# Patient Record
Sex: FEMALE | Race: WHITE | NOT HISPANIC OR LATINO
[De-identification: names, ages, dates, MRNs, and addresses within clinical notes are randomized per-mention and may not be internally consistent; named-entity substitution may affect disease eponyms.]

---

## 2017-04-02 ENCOUNTER — RX RENEWAL (OUTPATIENT)
Age: 62
End: 2017-04-02

## 2017-05-02 ENCOUNTER — RX RENEWAL (OUTPATIENT)
Age: 62
End: 2017-05-02

## 2017-05-17 ENCOUNTER — APPOINTMENT (OUTPATIENT)
Dept: DERMATOLOGY | Facility: CLINIC | Age: 62
End: 2017-05-17

## 2017-08-02 ENCOUNTER — LABORATORY RESULT (OUTPATIENT)
Age: 62
End: 2017-08-02

## 2017-08-07 ENCOUNTER — APPOINTMENT (OUTPATIENT)
Dept: INTERNAL MEDICINE | Facility: CLINIC | Age: 62
End: 2017-08-07
Payer: COMMERCIAL

## 2017-08-07 VITALS
DIASTOLIC BLOOD PRESSURE: 74 MMHG | RESPIRATION RATE: 16 BRPM | HEIGHT: 60 IN | TEMPERATURE: 97.8 F | WEIGHT: 112.99 LBS | OXYGEN SATURATION: 97 % | BODY MASS INDEX: 22.18 KG/M2 | SYSTOLIC BLOOD PRESSURE: 118 MMHG | HEART RATE: 84 BPM

## 2017-08-07 PROCEDURE — 90670 PCV13 VACCINE IM: CPT | Mod: GA

## 2017-08-07 PROCEDURE — 99214 OFFICE O/P EST MOD 30 MIN: CPT | Mod: 25

## 2017-08-07 PROCEDURE — G0009: CPT

## 2017-10-05 ENCOUNTER — RX RENEWAL (OUTPATIENT)
Age: 62
End: 2017-10-05

## 2018-01-10 ENCOUNTER — RX RENEWAL (OUTPATIENT)
Age: 63
End: 2018-01-10

## 2018-03-09 ENCOUNTER — LABORATORY RESULT (OUTPATIENT)
Age: 63
End: 2018-03-09

## 2018-03-14 ENCOUNTER — APPOINTMENT (OUTPATIENT)
Dept: INTERNAL MEDICINE | Facility: CLINIC | Age: 63
End: 2018-03-14
Payer: COMMERCIAL

## 2018-03-14 VITALS
SYSTOLIC BLOOD PRESSURE: 152 MMHG | BODY MASS INDEX: 22.19 KG/M2 | HEART RATE: 88 BPM | WEIGHT: 113 LBS | TEMPERATURE: 97.9 F | DIASTOLIC BLOOD PRESSURE: 88 MMHG | OXYGEN SATURATION: 97 % | RESPIRATION RATE: 14 BRPM | HEIGHT: 60 IN

## 2018-03-14 PROCEDURE — 99214 OFFICE O/P EST MOD 30 MIN: CPT

## 2018-03-19 ENCOUNTER — EMERGENCY (EMERGENCY)
Facility: HOSPITAL | Age: 63
LOS: 0 days | Discharge: HOME | End: 2018-03-19
Attending: EMERGENCY MEDICINE | Admitting: EMERGENCY MEDICINE
Payer: COMMERCIAL

## 2018-03-19 VITALS — WEIGHT: 113.98 LBS

## 2018-03-19 VITALS
OXYGEN SATURATION: 100 % | DIASTOLIC BLOOD PRESSURE: 95 MMHG | TEMPERATURE: 98 F | SYSTOLIC BLOOD PRESSURE: 180 MMHG | RESPIRATION RATE: 19 BRPM | HEART RATE: 88 BPM

## 2018-03-19 DIAGNOSIS — K21.9 GASTRO-ESOPHAGEAL REFLUX DISEASE WITHOUT ESOPHAGITIS: ICD-10-CM

## 2018-03-19 DIAGNOSIS — G44.89 OTHER HEADACHE SYNDROME: ICD-10-CM

## 2018-03-19 DIAGNOSIS — E03.9 HYPOTHYROIDISM, UNSPECIFIED: ICD-10-CM

## 2018-03-19 DIAGNOSIS — I10 ESSENTIAL (PRIMARY) HYPERTENSION: ICD-10-CM

## 2018-03-19 DIAGNOSIS — Z88.5 ALLERGY STATUS TO NARCOTIC AGENT: ICD-10-CM

## 2018-03-19 DIAGNOSIS — F41.9 ANXIETY DISORDER, UNSPECIFIED: ICD-10-CM

## 2018-03-19 LAB
ALBUMIN SERPL ELPH-MCNC: 4 G/DL — SIGNIFICANT CHANGE UP (ref 3.3–5)
ALP SERPL-CCNC: 71 U/L — SIGNIFICANT CHANGE UP (ref 40–120)
ALT FLD-CCNC: 24 U/L — SIGNIFICANT CHANGE UP (ref 12–78)
ANION GAP SERPL CALC-SCNC: 6 MMOL/L — SIGNIFICANT CHANGE UP (ref 5–17)
APPEARANCE UR: CLEAR — SIGNIFICANT CHANGE UP
AST SERPL-CCNC: 17 U/L — SIGNIFICANT CHANGE UP (ref 15–37)
BACTERIA # UR AUTO: NEGATIVE — SIGNIFICANT CHANGE UP
BASOPHILS # BLD AUTO: 0.05 K/UL — SIGNIFICANT CHANGE UP (ref 0–0.2)
BASOPHILS NFR BLD AUTO: 0.8 % — SIGNIFICANT CHANGE UP (ref 0–2)
BILIRUB SERPL-MCNC: 0.2 MG/DL — SIGNIFICANT CHANGE UP (ref 0.2–1.2)
BILIRUB UR-MCNC: NEGATIVE — SIGNIFICANT CHANGE UP
BUN SERPL-MCNC: 15 MG/DL — SIGNIFICANT CHANGE UP (ref 7–23)
CALCIUM SERPL-MCNC: 8.7 MG/DL — SIGNIFICANT CHANGE UP (ref 8.5–10.1)
CHLORIDE SERPL-SCNC: 104 MMOL/L — SIGNIFICANT CHANGE UP (ref 96–108)
CO2 SERPL-SCNC: 29 MMOL/L — SIGNIFICANT CHANGE UP (ref 22–31)
COLOR SPEC: YELLOW — SIGNIFICANT CHANGE UP
CREAT SERPL-MCNC: 0.66 MG/DL — SIGNIFICANT CHANGE UP (ref 0.5–1.3)
DIFF PNL FLD: (no result)
EOSINOPHIL # BLD AUTO: 0.15 K/UL — SIGNIFICANT CHANGE UP (ref 0–0.5)
EOSINOPHIL NFR BLD AUTO: 2.4 % — SIGNIFICANT CHANGE UP (ref 0–6)
EPI CELLS # UR: NEGATIVE — SIGNIFICANT CHANGE UP
GLUCOSE SERPL-MCNC: 93 MG/DL — SIGNIFICANT CHANGE UP (ref 70–99)
GLUCOSE UR QL: NEGATIVE MG/DL — SIGNIFICANT CHANGE UP
HCT VFR BLD CALC: 39.9 % — SIGNIFICANT CHANGE UP (ref 34.5–45)
HGB BLD-MCNC: 13.2 G/DL — SIGNIFICANT CHANGE UP (ref 11.5–15.5)
IMM GRANULOCYTES NFR BLD AUTO: 0.2 % — SIGNIFICANT CHANGE UP (ref 0–1.5)
KETONES UR-MCNC: NEGATIVE — SIGNIFICANT CHANGE UP
LEUKOCYTE ESTERASE UR-ACNC: NEGATIVE — SIGNIFICANT CHANGE UP
LYMPHOCYTES # BLD AUTO: 1.58 K/UL — SIGNIFICANT CHANGE UP (ref 1–3.3)
LYMPHOCYTES # BLD AUTO: 25 % — SIGNIFICANT CHANGE UP (ref 13–44)
MCHC RBC-ENTMCNC: 28.8 PG — SIGNIFICANT CHANGE UP (ref 27–34)
MCHC RBC-ENTMCNC: 33.1 GM/DL — SIGNIFICANT CHANGE UP (ref 32–36)
MCV RBC AUTO: 86.9 FL — SIGNIFICANT CHANGE UP (ref 80–100)
MONOCYTES # BLD AUTO: 0.54 K/UL — SIGNIFICANT CHANGE UP (ref 0–0.9)
MONOCYTES NFR BLD AUTO: 8.5 % — SIGNIFICANT CHANGE UP (ref 2–14)
NEUTROPHILS # BLD AUTO: 4 K/UL — SIGNIFICANT CHANGE UP (ref 1.8–7.4)
NEUTROPHILS NFR BLD AUTO: 63.1 % — SIGNIFICANT CHANGE UP (ref 43–77)
NITRITE UR-MCNC: NEGATIVE — SIGNIFICANT CHANGE UP
NRBC # BLD: 0 /100 WBCS — SIGNIFICANT CHANGE UP (ref 0–0)
PH UR: 7 — SIGNIFICANT CHANGE UP (ref 5–8)
PLATELET # BLD AUTO: 247 K/UL — SIGNIFICANT CHANGE UP (ref 150–400)
POTASSIUM SERPL-MCNC: 3.3 MMOL/L — LOW (ref 3.5–5.3)
POTASSIUM SERPL-SCNC: 3.3 MMOL/L — LOW (ref 3.5–5.3)
PROT SERPL-MCNC: 7.2 GM/DL — SIGNIFICANT CHANGE UP (ref 6–8.3)
PROT UR-MCNC: NEGATIVE MG/DL — SIGNIFICANT CHANGE UP
RBC # BLD: 4.59 M/UL — SIGNIFICANT CHANGE UP (ref 3.8–5.2)
RBC # FLD: 13.6 % — SIGNIFICANT CHANGE UP (ref 10.3–14.5)
RBC CASTS # UR COMP ASSIST: SIGNIFICANT CHANGE UP /HPF (ref 0–4)
SODIUM SERPL-SCNC: 139 MMOL/L — SIGNIFICANT CHANGE UP (ref 135–145)
SP GR SPEC: 1 — LOW (ref 1.01–1.02)
TROPONIN I SERPL-MCNC: <0.015 NG/ML — SIGNIFICANT CHANGE UP (ref 0.01–0.04)
TROPONIN I SERPL-MCNC: <0.015 NG/ML — SIGNIFICANT CHANGE UP (ref 0.01–0.04)
UROBILINOGEN FLD QL: NEGATIVE MG/DL — SIGNIFICANT CHANGE UP
WBC # BLD: 6.33 K/UL — SIGNIFICANT CHANGE UP (ref 3.8–10.5)
WBC # FLD AUTO: 6.33 K/UL — SIGNIFICANT CHANGE UP (ref 3.8–10.5)
WBC UR QL: SIGNIFICANT CHANGE UP

## 2018-03-19 PROCEDURE — 99285 EMERGENCY DEPT VISIT HI MDM: CPT | Mod: 25

## 2018-03-19 PROCEDURE — 93010 ELECTROCARDIOGRAM REPORT: CPT

## 2018-03-19 PROCEDURE — 70450 CT HEAD/BRAIN W/O DYE: CPT | Mod: 26

## 2018-03-19 PROCEDURE — 71046 X-RAY EXAM CHEST 2 VIEWS: CPT | Mod: 26

## 2018-03-19 RX ORDER — LOSARTAN POTASSIUM 100 MG/1
50 TABLET, FILM COATED ORAL DAILY
Qty: 0 | Refills: 0 | Status: DISCONTINUED | OUTPATIENT
Start: 2018-03-19 | End: 2018-03-19

## 2018-03-19 RX ADMIN — LOSARTAN POTASSIUM 50 MILLIGRAM(S): 100 TABLET, FILM COATED ORAL at 22:52

## 2018-03-19 NOTE — ED STATDOCS - OBJECTIVE STATEMENT
61 y/o F w/ pmhx of acid reflux, hypothyroidism, HTN, anxiety, presents to ED c/o HTN. Pt checked BP and states it has been elevated x4-5 days, sent in by Dr. Ames. Last night 177/100, this /94. Currently on Losartan x1+ year. (+)headache (+)chest pains intermittently 2-3x per day. No recent foreign travel or recent sick contacts. Non-smoker. Occasional alcohol use. No illicit drug use. BP in /107.

## 2018-03-19 NOTE — ED STATDOCS - PROGRESS NOTE DETAILS
63 yo female with a PMH of anxiety, HTN (on losartan, increased to 50mg) presents with elevated BP 3-4 days with intermittent episodes of headache. Highest was 177/100. -Kana Gross PA-C Spoke with Dr. Telles, covering for Dr. Ames. He said that the pt can double up on her 50mg of Losartan and call the office tomorrow for a f/u appointment. -Kana Gross PA-C

## 2018-03-19 NOTE — ED STATDOCS - ATTENDING CONTRIBUTION TO CARE
I, Barbara Nayak MD,  performed the initial face to face bedside interview with this patient regarding history of present illness, review of symptoms and relevant past medical, social and family history.  I completed an independent physical examination.  I was the initial provider who evaluated this patient. I have signed out the follow up of any pending tests (i.e. labs, radiological studies) to the ACP.  I have communicated the patient’s plan of care and disposition with the ACP.  The history, relevant review of systems, past medical and surgical history, medical decision making, and physical examination was documented by the scribe in my presence and I attest to the accuracy of the documentation.

## 2018-03-19 NOTE — ED STATDOCS - CARE PLAN
Principal Discharge DX:	Hypertension, unspecified type Principal Discharge DX:	Hypertension, unspecified type  Secondary Diagnosis:	Acute intractable headache, unspecified headache type

## 2018-03-20 ENCOUNTER — APPOINTMENT (OUTPATIENT)
Dept: INTERNAL MEDICINE | Facility: CLINIC | Age: 63
End: 2018-03-20
Payer: COMMERCIAL

## 2018-03-20 VITALS
BODY MASS INDEX: 22.38 KG/M2 | HEIGHT: 60 IN | HEART RATE: 94 BPM | WEIGHT: 114 LBS | TEMPERATURE: 98 F | DIASTOLIC BLOOD PRESSURE: 100 MMHG | RESPIRATION RATE: 16 BRPM | OXYGEN SATURATION: 99 % | SYSTOLIC BLOOD PRESSURE: 162 MMHG

## 2018-03-20 DIAGNOSIS — Z87.19 PERSONAL HISTORY OF OTHER DISEASES OF THE DIGESTIVE SYSTEM: ICD-10-CM

## 2018-03-20 LAB
CULTURE RESULTS: NO GROWTH — SIGNIFICANT CHANGE UP
SPECIMEN SOURCE: SIGNIFICANT CHANGE UP

## 2018-03-20 PROCEDURE — 99213 OFFICE O/P EST LOW 20 MIN: CPT

## 2018-03-20 RX ORDER — HYDROCHLOROTHIAZIDE 12.5 MG/1
12.5 CAPSULE ORAL DAILY
Qty: 60 | Refills: 0 | Status: DISCONTINUED | COMMUNITY
Start: 2018-03-20 | End: 2018-03-20

## 2018-03-22 ENCOUNTER — MESSAGE (OUTPATIENT)
Age: 63
End: 2018-03-22

## 2018-03-29 ENCOUNTER — APPOINTMENT (OUTPATIENT)
Dept: INTERNAL MEDICINE | Facility: CLINIC | Age: 63
End: 2018-03-29
Payer: COMMERCIAL

## 2018-03-29 VITALS
HEART RATE: 90 BPM | WEIGHT: 111 LBS | SYSTOLIC BLOOD PRESSURE: 126 MMHG | OXYGEN SATURATION: 99 % | RESPIRATION RATE: 16 BRPM | HEIGHT: 60 IN | BODY MASS INDEX: 21.79 KG/M2 | TEMPERATURE: 98.5 F | DIASTOLIC BLOOD PRESSURE: 80 MMHG

## 2018-03-29 PROCEDURE — 99213 OFFICE O/P EST LOW 20 MIN: CPT

## 2018-04-03 ENCOUNTER — RX RENEWAL (OUTPATIENT)
Age: 63
End: 2018-04-03

## 2018-04-05 ENCOUNTER — APPOINTMENT (OUTPATIENT)
Dept: INTERNAL MEDICINE | Facility: CLINIC | Age: 63
End: 2018-04-05
Payer: COMMERCIAL

## 2018-04-05 VITALS
DIASTOLIC BLOOD PRESSURE: 86 MMHG | HEART RATE: 82 BPM | OXYGEN SATURATION: 97 % | HEIGHT: 60 IN | WEIGHT: 113 LBS | TEMPERATURE: 98 F | BODY MASS INDEX: 22.19 KG/M2 | RESPIRATION RATE: 14 BRPM | SYSTOLIC BLOOD PRESSURE: 144 MMHG

## 2018-04-05 PROCEDURE — 99213 OFFICE O/P EST LOW 20 MIN: CPT

## 2018-04-05 RX ORDER — LOSARTAN POTASSIUM 50 MG/1
50 TABLET, FILM COATED ORAL
Qty: 90 | Refills: 1 | Status: DISCONTINUED | COMMUNITY
Start: 2018-01-10 | End: 2018-04-05

## 2018-04-06 RX ORDER — BUPROPION HYDROCHLORIDE 150 MG/1
150 TABLET, EXTENDED RELEASE ORAL
Qty: 30 | Refills: 0 | Status: DISCONTINUED | COMMUNITY
Start: 2018-03-26

## 2018-05-03 ENCOUNTER — APPOINTMENT (OUTPATIENT)
Dept: INTERNAL MEDICINE | Facility: CLINIC | Age: 63
End: 2018-05-03
Payer: COMMERCIAL

## 2018-05-03 ENCOUNTER — RESULT REVIEW (OUTPATIENT)
Age: 63
End: 2018-05-03

## 2018-05-03 VITALS
RESPIRATION RATE: 16 BRPM | WEIGHT: 110 LBS | HEIGHT: 60 IN | BODY MASS INDEX: 21.6 KG/M2 | DIASTOLIC BLOOD PRESSURE: 82 MMHG | HEART RATE: 92 BPM | OXYGEN SATURATION: 97 % | TEMPERATURE: 98 F | SYSTOLIC BLOOD PRESSURE: 130 MMHG

## 2018-05-03 PROCEDURE — 99213 OFFICE O/P EST LOW 20 MIN: CPT

## 2018-06-25 ENCOUNTER — RX RENEWAL (OUTPATIENT)
Age: 63
End: 2018-06-25

## 2018-07-05 ENCOUNTER — APPOINTMENT (OUTPATIENT)
Dept: OBGYN | Facility: CLINIC | Age: 63
End: 2018-07-05
Payer: COMMERCIAL

## 2018-07-05 VITALS
WEIGHT: 110.89 LBS | HEIGHT: 60 IN | BODY MASS INDEX: 21.77 KG/M2 | SYSTOLIC BLOOD PRESSURE: 130 MMHG | DIASTOLIC BLOOD PRESSURE: 80 MMHG

## 2018-07-05 PROCEDURE — 82270 OCCULT BLOOD FECES: CPT

## 2018-07-05 PROCEDURE — 99396 PREV VISIT EST AGE 40-64: CPT

## 2018-07-09 LAB — CYTOLOGY CVX/VAG DOC THIN PREP: NORMAL

## 2018-07-17 PROBLEM — I10 ESSENTIAL (PRIMARY) HYPERTENSION: Chronic | Status: INACTIVE | Noted: 2018-03-19 | Resolved: 2018-03-21

## 2018-07-17 PROBLEM — F41.9 ANXIETY DISORDER, UNSPECIFIED: Chronic | Status: INACTIVE | Noted: 2018-03-19 | Resolved: 2018-03-21

## 2018-07-17 PROBLEM — E03.9 HYPOTHYROIDISM, UNSPECIFIED: Chronic | Status: INACTIVE | Noted: 2018-03-19 | Resolved: 2018-03-21

## 2018-09-06 ENCOUNTER — LABORATORY RESULT (OUTPATIENT)
Age: 63
End: 2018-09-06

## 2018-09-12 ENCOUNTER — APPOINTMENT (OUTPATIENT)
Dept: INTERNAL MEDICINE | Facility: CLINIC | Age: 63
End: 2018-09-12
Payer: COMMERCIAL

## 2018-09-12 VITALS
OXYGEN SATURATION: 98 % | SYSTOLIC BLOOD PRESSURE: 110 MMHG | DIASTOLIC BLOOD PRESSURE: 84 MMHG | WEIGHT: 109.99 LBS | BODY MASS INDEX: 21.59 KG/M2 | HEIGHT: 60 IN | HEART RATE: 86 BPM | RESPIRATION RATE: 16 BRPM | TEMPERATURE: 98.6 F

## 2018-09-12 PROBLEM — I10 ESSENTIAL (PRIMARY) HYPERTENSION: Chronic | Status: ACTIVE | Noted: 2018-03-21

## 2018-09-12 PROBLEM — E03.9 HYPOTHYROIDISM, UNSPECIFIED: Chronic | Status: ACTIVE | Noted: 2018-03-21

## 2018-09-12 PROBLEM — F41.9 ANXIETY DISORDER, UNSPECIFIED: Chronic | Status: ACTIVE | Noted: 2018-03-21

## 2018-09-12 PROCEDURE — 99214 OFFICE O/P EST MOD 30 MIN: CPT

## 2018-09-14 ENCOUNTER — MEDICATION RENEWAL (OUTPATIENT)
Age: 63
End: 2018-09-14

## 2018-09-14 RX ORDER — HYDROCHLOROTHIAZIDE 12.5 MG/1
12.5 CAPSULE ORAL DAILY
Qty: 90 | Refills: 1 | Status: COMPLETED | COMMUNITY
Start: 2018-03-20 | End: 2018-09-14

## 2018-09-14 RX ORDER — LOSARTAN POTASSIUM 100 MG/1
100 TABLET, FILM COATED ORAL DAILY
Qty: 90 | Refills: 1 | Status: COMPLETED | COMMUNITY
Start: 2018-04-06 | End: 2018-09-14

## 2018-09-28 ENCOUNTER — RX RENEWAL (OUTPATIENT)
Age: 63
End: 2018-09-28

## 2018-10-18 ENCOUNTER — RX RENEWAL (OUTPATIENT)
Age: 63
End: 2018-10-18

## 2018-11-27 ENCOUNTER — OTHER (OUTPATIENT)
Age: 63
End: 2018-11-27

## 2018-11-27 ENCOUNTER — APPOINTMENT (OUTPATIENT)
Dept: INTERNAL MEDICINE | Facility: CLINIC | Age: 63
End: 2018-11-27
Payer: COMMERCIAL

## 2018-11-27 VITALS
SYSTOLIC BLOOD PRESSURE: 138 MMHG | DIASTOLIC BLOOD PRESSURE: 82 MMHG | HEART RATE: 74 BPM | WEIGHT: 111 LBS | HEIGHT: 60 IN | RESPIRATION RATE: 16 BRPM | OXYGEN SATURATION: 95 % | BODY MASS INDEX: 21.79 KG/M2 | TEMPERATURE: 98.7 F

## 2018-11-27 PROCEDURE — 99213 OFFICE O/P EST LOW 20 MIN: CPT

## 2018-11-27 NOTE — HISTORY OF PRESENT ILLNESS
[FreeTextEntry8] : Patient complains of sciatic pain left lower extremity, burning and tingling at times extending to left toes.  Left paraspinal tenderness lower lumbar area.  Tenderness left sciatic notch.\par Pain started at left hamstring 1 month ago then "moved to side and front of left thigh, traveling down leg to current status.  \par History of lower lumbar 2 disc fusion 18 years ago.\juana Does not have past MRI's in her possession.\par Had occasional "flares" over the years, not as long lasting as current.\juana Has been taking Motrin 800 mg BID and icing low back.  Provides temporary, partial relief.\juana Denies bowel changes or foot drop.\juana Has appointment with Dr. Antunez February. (soonest appointment, on waiting list for cancellation)\juana

## 2018-11-27 NOTE — REVIEW OF SYSTEMS
[Muscle Pain] : muscle pain [Back Pain] : back pain [Negative] : Heme/Lymph [FreeTextEntry9] : see HPI [de-identified] : see HPI

## 2018-11-27 NOTE — PHYSICAL EXAM
[No Acute Distress] : no acute distress [Well Nourished] : well nourished [Well Developed] : well developed [Supple] : supple [No Respiratory Distress] : no respiratory distress  [Clear to Auscultation] : lungs were clear to auscultation bilaterally [Normal Rate] : normal rate  [Regular Rhythm] : with a regular rhythm [No Edema] : there was no peripheral edema [No CVA Tenderness] : no CVA  tenderness [No Spinal Tenderness] : no spinal tenderness [Grossly Normal Strength/Tone] : grossly normal strength/tone [Coordination Grossly Intact] : coordination grossly intact [No Focal Deficits] : no focal deficits [Normal Affect] : the affect was normal [Normal Insight/Judgement] : insight and judgment were intact [de-identified] : left lower lumbar paraspinal tenderness, displays full ROM, pain elicited on internal rotation of leg. [de-identified] : mildly antalgic gait

## 2018-11-27 NOTE — PLAN
[FreeTextEntry1] : Continue ice to low back as tolerated 20 minute intervals.\par Trial Medrol dose pack as prescribed PC.\par After dose pack complete, if pain persistent, start Mobic 15 mg daily PC.\par Patient declines lumbar spinal x-ray.\par Script written for physical therapy for pain relief.\par Patient voices understanding she must present to ED for emergent symptoms.\par She will call office with her progress when Medrol pack completed, sooner if questions, worsening pain.

## 2018-12-17 NOTE — ED ADULT NURSE NOTE - TEMPLATE LIST FOR HEAD TO TOE ASSESSMENT
mouth 2 times daily for 30 days. Ulises Moore Date: 12/5/18 60 tablet 0    montelukast (SINGULAIR) 10 MG tablet Take 1 tablet by mouth daily 30 tablet 11    clotrimazole-betamethasone (LOTRISONE) 1-0.05 % cream Apply topically 2 times daily. 15 g 0    cyclobenzaprine (FLEXERIL) 10 MG tablet TAKE 1 TABLET BY MOUTH DAILY AS NEEDED FOR MUSCLE SPASMS 30 tablet 0    FLUoxetine (PROZAC) 20 MG capsule TAKE 1 CAPSULE BY MOUTH DAILY 90 capsule 1    pantoprazole (PROTONIX) 40 MG tablet TAKE ONE TABLET BY MOUTH DAILY 90 tablet 1    busPIRone (BUSPAR) 15 MG tablet Take 1 tablet by mouth 3 times daily Take for anxiety 90 tablet 2       Allergies:     Allergies   Allergen Reactions    Ibuprofen Anaphylaxis    Nsaids Anaphylaxis    Erythromycin Nausea And Vomiting    Bactrim Nausea And Vomiting and Rash       Problem List:    Patient Active Problem List   Diagnosis Code    Depression F32.9    Vitamin D deficiency E55.9    Edema R60.9    Fibroid, uterine D25.9    Female stress incontinence N39.3    Cystocele, midline N81.11    Rosacea L71.9    Scoliosis of thoracic spine M41.9    Thoracic back pain M54.6    Impaired fasting glucose R73.01    Leg length inequality M21.70    Allergic rhinitis J30.9    GERD (gastroesophageal reflux disease) K21.9    Hypercholesterolemia E78.00    Restrictive lung disease J98.4    Obesity E66.9    Emphysema of lung (HCC) J43.9    Corn of toe L43    Lichen sclerosus of female genitalia N90.4    Displacement of lumbar intervertebral disc without myelopathy M51.26    Degeneration of lumbar or lumbosacral intervertebral disc M51.37    Spinal stenosis, lumbar region, without neurogenic claudication M48.061    Lumbosacral spondylosis without myelopathy M47.817    Lumbar degenerative disc disease M51.36    Lumbar facet arthropathy M47.816    Lumbar stenosis M48.061    Macrocytosis without anemia D75.89       Past Medical History:        Diagnosis Date    Arthritis General

## 2018-12-31 ENCOUNTER — MEDICATION RENEWAL (OUTPATIENT)
Age: 63
End: 2018-12-31

## 2019-01-02 ENCOUNTER — MEDICATION RENEWAL (OUTPATIENT)
Age: 64
End: 2019-01-02

## 2019-01-02 RX ORDER — METHYLPREDNISOLONE 4 MG/1
4 TABLET ORAL
Qty: 1 | Refills: 0 | Status: COMPLETED | COMMUNITY
Start: 2018-11-27 | End: 2019-01-02

## 2019-02-25 ENCOUNTER — RX RENEWAL (OUTPATIENT)
Age: 64
End: 2019-02-25

## 2019-03-14 ENCOUNTER — MEDICATION RENEWAL (OUTPATIENT)
Age: 64
End: 2019-03-14

## 2019-03-20 ENCOUNTER — APPOINTMENT (OUTPATIENT)
Dept: INTERNAL MEDICINE | Facility: CLINIC | Age: 64
End: 2019-03-20
Payer: COMMERCIAL

## 2019-03-20 VITALS
BODY MASS INDEX: 22.38 KG/M2 | WEIGHT: 114 LBS | TEMPERATURE: 97.6 F | SYSTOLIC BLOOD PRESSURE: 118 MMHG | HEART RATE: 82 BPM | OXYGEN SATURATION: 97 % | RESPIRATION RATE: 16 BRPM | HEIGHT: 60 IN | DIASTOLIC BLOOD PRESSURE: 84 MMHG

## 2019-03-20 DIAGNOSIS — Z84.89 FAMILY HISTORY OF OTHER SPECIFIED CONDITIONS: ICD-10-CM

## 2019-03-20 DIAGNOSIS — Z80.1 FAMILY HISTORY OF MALIGNANT NEOPLASM OF TRACHEA, BRONCHUS AND LUNG: ICD-10-CM

## 2019-03-20 DIAGNOSIS — Z82.49 FAMILY HISTORY OF ISCHEMIC HEART DISEASE AND OTHER DISEASES OF THE CIRCULATORY SYSTEM: ICD-10-CM

## 2019-03-20 DIAGNOSIS — Z80.8 FAMILY HISTORY OF MALIGNANT NEOPLASM OF OTHER ORGANS OR SYSTEMS: ICD-10-CM

## 2019-03-20 DIAGNOSIS — Z80.0 FAMILY HISTORY OF MALIGNANT NEOPLASM OF DIGESTIVE ORGANS: ICD-10-CM

## 2019-03-20 DIAGNOSIS — Z83.3 FAMILY HISTORY OF DIABETES MELLITUS: ICD-10-CM

## 2019-03-20 DIAGNOSIS — Z82.3 FAMILY HISTORY OF STROKE: ICD-10-CM

## 2019-03-20 LAB
ANION GAP SERPL CALC-SCNC: 13 MMOL/L
BUN SERPL-MCNC: 16 MG/DL
CALCIUM SERPL-MCNC: 10.2 MG/DL
CHLORIDE SERPL-SCNC: 96 MMOL/L
CO2 SERPL-SCNC: 29 MMOL/L
CREAT SERPL-MCNC: 0.68 MG/DL
GLUCOSE SERPL-MCNC: 88 MG/DL
POTASSIUM SERPL-SCNC: 4 MMOL/L
SODIUM SERPL-SCNC: 138 MMOL/L

## 2019-03-20 PROCEDURE — 99214 OFFICE O/P EST MOD 30 MIN: CPT

## 2019-03-20 RX ORDER — MELOXICAM 15 MG/1
15 TABLET ORAL
Qty: 30 | Refills: 2 | Status: DISCONTINUED | COMMUNITY
Start: 2018-11-27 | End: 2019-03-20

## 2019-03-20 RX ORDER — LEVOTHYROXINE SODIUM 75 UG/1
75 TABLET ORAL DAILY
Qty: 90 | Refills: 1 | Status: ACTIVE | COMMUNITY
Start: 2019-03-20

## 2019-03-20 RX ORDER — BUTALBITAL, ACETAMINOPHEN, AND CAFFEINE 50; 300; 40 MG/1; MG/1; MG/1
50-300-40 CAPSULE ORAL
Refills: 0 | Status: COMPLETED | COMMUNITY

## 2019-03-20 RX ORDER — VORTIOXETINE 5 MG/1
5 TABLET, FILM COATED ORAL
Refills: 0 | Status: DISCONTINUED | COMMUNITY
Start: 2018-04-11 | End: 2019-03-20

## 2019-03-20 NOTE — ASSESSMENT
[FreeTextEntry1] : FU with .  \par \par FU .  \par \par Xanax refilled. \par Side effects of meds reviewed including potential for sedation and dependency.   Advised not to drink alcohol, drive or operate heavy machinery while on medication.\par Continue pscyotherapy.\par \par BMP today to check Na.  Low NA felt to be due to HCT or Trintellex.  She is off Trintelex now and is unclear of her current dose of Losartan HCT.  She will call here with her current  dosage. BP well controlled.

## 2019-03-20 NOTE — PHYSICAL EXAM
[Well Nourished] : well nourished [No Acute Distress] : no acute distress [Normal Outer Ear/Nose] : the outer ears and nose were normal in appearance [Normal Oropharynx] : the oropharynx was normal [No JVD] : no jugular venous distention [Supple] : supple [No Lymphadenopathy] : no lymphadenopathy [No Respiratory Distress] : no respiratory distress  [Clear to Auscultation] : lungs were clear to auscultation bilaterally [No Accessory Muscle Use] : no accessory muscle use [Normal Rate] : normal rate  [Regular Rhythm] : with a regular rhythm [Normal S1, S2] : normal S1 and S2 [No Edema] : there was no peripheral edema [No Extremity Clubbing/Cyanosis] : no extremity clubbing/cyanosis [Soft] : abdomen soft [Non Tender] : non-tender [No Joint Swelling] : no joint swelling [Normal Bowel Sounds] : normal bowel sounds [Grossly Normal Strength/Tone] : grossly normal strength/tone [No Rash] : no rash [Normal Gait] : normal gait [Coordination Grossly Intact] : coordination grossly intact [No Focal Deficits] : no focal deficits [Normal Affect] : the affect was normal [Normal Mood] : the mood was normal [Alert and Oriented x3] : oriented to person, place, and time [de-identified] : lumbar paraverteral tenderness.

## 2019-03-20 NOTE — HISTORY OF PRESENT ILLNESS
[FreeTextEntry1] : 6 month FU [de-identified] : For past several months she has been having significant back pain with BL LE numbness.   Follows with .  MRI reportedly showed pinched nerve.  No benefit with PT and low dose gabapentin HS.  Last week had steroid injection with some mild relief.  Due FU 2 weeks.   \par Trintellex was DC'd by [facility] due to constipation and ?hyponatremia.  She continues to have periods of anxiety but uses xanax infrequently.  \par +hypothyroid - due FU with  in few weeks.  \par GERD well controlled with prn Zegerid OTC and avoidance of triggers.

## 2019-03-20 NOTE — REVIEW OF SYSTEMS
[Chest Pain] : no chest pain [Abdominal Pain] : no abdominal pain [Constipation] : constipation [Back Pain] : back pain [Skin Rash] : no skin rash [Suicidal] : not suicidal [Depression] : depression [Negative] : Heme/Lymph

## 2019-03-25 ENCOUNTER — APPOINTMENT (OUTPATIENT)
Dept: INTERNAL MEDICINE | Facility: CLINIC | Age: 64
End: 2019-03-25

## 2019-04-21 ENCOUNTER — EMERGENCY (EMERGENCY)
Facility: HOSPITAL | Age: 64
LOS: 0 days | Discharge: HOME | End: 2019-04-22
Attending: EMERGENCY MEDICINE | Admitting: EMERGENCY MEDICINE
Payer: COMMERCIAL

## 2019-04-21 VITALS — HEIGHT: 61 IN | WEIGHT: 110.01 LBS

## 2019-04-21 DIAGNOSIS — R10.9 UNSPECIFIED ABDOMINAL PAIN: ICD-10-CM

## 2019-04-21 DIAGNOSIS — E87.6 HYPOKALEMIA: ICD-10-CM

## 2019-04-21 DIAGNOSIS — R19.7 DIARRHEA, UNSPECIFIED: ICD-10-CM

## 2019-04-21 DIAGNOSIS — K58.9 IRRITABLE BOWEL SYNDROME WITHOUT DIARRHEA: ICD-10-CM

## 2019-04-21 DIAGNOSIS — I10 ESSENTIAL (PRIMARY) HYPERTENSION: ICD-10-CM

## 2019-04-21 DIAGNOSIS — K52.9 NONINFECTIVE GASTROENTERITIS AND COLITIS, UNSPECIFIED: ICD-10-CM

## 2019-04-21 DIAGNOSIS — E03.9 HYPOTHYROIDISM, UNSPECIFIED: ICD-10-CM

## 2019-04-21 DIAGNOSIS — E86.0 DEHYDRATION: ICD-10-CM

## 2019-04-21 DIAGNOSIS — R11.2 NAUSEA WITH VOMITING, UNSPECIFIED: ICD-10-CM

## 2019-04-21 DIAGNOSIS — F41.9 ANXIETY DISORDER, UNSPECIFIED: ICD-10-CM

## 2019-04-21 LAB
ALBUMIN SERPL ELPH-MCNC: 4.1 G/DL — SIGNIFICANT CHANGE UP (ref 3.3–5)
ALP SERPL-CCNC: 51 U/L — SIGNIFICANT CHANGE UP (ref 40–120)
ALT FLD-CCNC: 35 U/L — SIGNIFICANT CHANGE UP (ref 12–78)
ANION GAP SERPL CALC-SCNC: 13 MMOL/L — SIGNIFICANT CHANGE UP (ref 5–17)
APPEARANCE UR: CLEAR — SIGNIFICANT CHANGE UP
APTT BLD: 33 SEC — SIGNIFICANT CHANGE UP (ref 27.5–36.3)
AST SERPL-CCNC: 24 U/L — SIGNIFICANT CHANGE UP (ref 15–37)
BACTERIA # UR AUTO: ABNORMAL
BASOPHILS # BLD AUTO: 0.01 K/UL — SIGNIFICANT CHANGE UP (ref 0–0.2)
BASOPHILS NFR BLD AUTO: 0.1 % — SIGNIFICANT CHANGE UP (ref 0–2)
BILIRUB SERPL-MCNC: 0.6 MG/DL — SIGNIFICANT CHANGE UP (ref 0.2–1.2)
BILIRUB UR-MCNC: NEGATIVE — SIGNIFICANT CHANGE UP
BLD GP AB SCN SERPL QL: SIGNIFICANT CHANGE UP
BUN SERPL-MCNC: 13 MG/DL — SIGNIFICANT CHANGE UP (ref 7–23)
CALCIUM SERPL-MCNC: 9.1 MG/DL — SIGNIFICANT CHANGE UP (ref 8.5–10.1)
CHLORIDE SERPL-SCNC: 87 MMOL/L — LOW (ref 96–108)
CO2 SERPL-SCNC: 24 MMOL/L — SIGNIFICANT CHANGE UP (ref 22–31)
COLOR SPEC: YELLOW — SIGNIFICANT CHANGE UP
CREAT SERPL-MCNC: 0.71 MG/DL — SIGNIFICANT CHANGE UP (ref 0.5–1.3)
DIFF PNL FLD: ABNORMAL
EOSINOPHIL # BLD AUTO: 0 K/UL — SIGNIFICANT CHANGE UP (ref 0–0.5)
EOSINOPHIL NFR BLD AUTO: 0 % — SIGNIFICANT CHANGE UP (ref 0–6)
EPI CELLS # UR: SIGNIFICANT CHANGE UP
GLUCOSE SERPL-MCNC: 116 MG/DL — HIGH (ref 70–99)
GLUCOSE UR QL: NEGATIVE MG/DL — SIGNIFICANT CHANGE UP
HCT VFR BLD CALC: 39.6 % — SIGNIFICANT CHANGE UP (ref 34.5–45)
HGB BLD-MCNC: 13.9 G/DL — SIGNIFICANT CHANGE UP (ref 11.5–15.5)
HYALINE CASTS # UR AUTO: ABNORMAL /LPF
IMM GRANULOCYTES NFR BLD AUTO: 0.4 % — SIGNIFICANT CHANGE UP (ref 0–1.5)
INR BLD: 1.06 RATIO — SIGNIFICANT CHANGE UP (ref 0.88–1.16)
KETONES UR-MCNC: ABNORMAL
LEUKOCYTE ESTERASE UR-ACNC: NEGATIVE — SIGNIFICANT CHANGE UP
LIDOCAIN IGE QN: 54 U/L — LOW (ref 73–393)
LYMPHOCYTES # BLD AUTO: 0.57 K/UL — LOW (ref 1–3.3)
LYMPHOCYTES # BLD AUTO: 5.9 % — LOW (ref 13–44)
MCHC RBC-ENTMCNC: 28.9 PG — SIGNIFICANT CHANGE UP (ref 27–34)
MCHC RBC-ENTMCNC: 35.1 GM/DL — SIGNIFICANT CHANGE UP (ref 32–36)
MCV RBC AUTO: 82.3 FL — SIGNIFICANT CHANGE UP (ref 80–100)
MONOCYTES # BLD AUTO: 0.46 K/UL — SIGNIFICANT CHANGE UP (ref 0–0.9)
MONOCYTES NFR BLD AUTO: 4.8 % — SIGNIFICANT CHANGE UP (ref 2–14)
NEUTROPHILS # BLD AUTO: 8.58 K/UL — HIGH (ref 1.8–7.4)
NEUTROPHILS NFR BLD AUTO: 88.8 % — HIGH (ref 43–77)
NITRITE UR-MCNC: NEGATIVE — SIGNIFICANT CHANGE UP
NRBC # BLD: 0 /100 WBCS — SIGNIFICANT CHANGE UP (ref 0–0)
PH UR: 6.5 — SIGNIFICANT CHANGE UP (ref 5–8)
PLATELET # BLD AUTO: 309 K/UL — SIGNIFICANT CHANGE UP (ref 150–400)
POTASSIUM SERPL-MCNC: 3.1 MMOL/L — LOW (ref 3.5–5.3)
POTASSIUM SERPL-SCNC: 3.1 MMOL/L — LOW (ref 3.5–5.3)
PROT SERPL-MCNC: 7.4 GM/DL — SIGNIFICANT CHANGE UP (ref 6–8.3)
PROT UR-MCNC: 30 MG/DL
PROTHROM AB SERPL-ACNC: 11.8 SEC — SIGNIFICANT CHANGE UP (ref 10–12.9)
RBC # BLD: 4.81 M/UL — SIGNIFICANT CHANGE UP (ref 3.8–5.2)
RBC # FLD: 12.8 % — SIGNIFICANT CHANGE UP (ref 10.3–14.5)
RBC CASTS # UR COMP ASSIST: ABNORMAL /HPF (ref 0–4)
SODIUM SERPL-SCNC: 124 MMOL/L — LOW (ref 135–145)
SP GR SPEC: 1.01 — SIGNIFICANT CHANGE UP (ref 1.01–1.02)
TYPE + AB SCN PNL BLD: SIGNIFICANT CHANGE UP
UROBILINOGEN FLD QL: NEGATIVE MG/DL — SIGNIFICANT CHANGE UP
WBC # BLD: 9.66 K/UL — SIGNIFICANT CHANGE UP (ref 3.8–10.5)
WBC # FLD AUTO: 9.66 K/UL — SIGNIFICANT CHANGE UP (ref 3.8–10.5)
WBC UR QL: SIGNIFICANT CHANGE UP

## 2019-04-21 PROCEDURE — 99284 EMERGENCY DEPT VISIT MOD MDM: CPT

## 2019-04-21 PROCEDURE — 74177 CT ABD & PELVIS W/CONTRAST: CPT | Mod: 26

## 2019-04-21 RX ORDER — FAMOTIDINE 10 MG/ML
20 INJECTION INTRAVENOUS ONCE
Qty: 0 | Refills: 0 | Status: COMPLETED | OUTPATIENT
Start: 2019-04-21 | End: 2019-04-21

## 2019-04-21 RX ORDER — SODIUM CHLORIDE 9 MG/ML
3 INJECTION INTRAMUSCULAR; INTRAVENOUS; SUBCUTANEOUS ONCE
Qty: 0 | Refills: 0 | Status: COMPLETED | OUTPATIENT
Start: 2019-04-21 | End: 2019-04-21

## 2019-04-21 RX ORDER — ONDANSETRON 8 MG/1
4 TABLET, FILM COATED ORAL ONCE
Qty: 0 | Refills: 0 | Status: COMPLETED | OUTPATIENT
Start: 2019-04-21 | End: 2019-04-21

## 2019-04-21 RX ORDER — POTASSIUM CHLORIDE 20 MEQ
40 PACKET (EA) ORAL ONCE
Qty: 0 | Refills: 0 | Status: COMPLETED | OUTPATIENT
Start: 2019-04-21 | End: 2019-04-21

## 2019-04-21 RX ORDER — POTASSIUM CHLORIDE 20 MEQ
10 PACKET (EA) ORAL ONCE
Qty: 0 | Refills: 0 | Status: COMPLETED | OUTPATIENT
Start: 2019-04-21 | End: 2019-04-21

## 2019-04-21 RX ORDER — SODIUM CHLORIDE 9 MG/ML
2000 INJECTION INTRAMUSCULAR; INTRAVENOUS; SUBCUTANEOUS ONCE
Qty: 0 | Refills: 0 | Status: COMPLETED | OUTPATIENT
Start: 2019-04-21 | End: 2019-04-21

## 2019-04-21 RX ADMIN — ONDANSETRON 4 MILLIGRAM(S): 8 TABLET, FILM COATED ORAL at 21:30

## 2019-04-21 RX ADMIN — FAMOTIDINE 20 MILLIGRAM(S): 10 INJECTION INTRAVENOUS at 21:29

## 2019-04-21 RX ADMIN — SODIUM CHLORIDE 2000 MILLILITER(S): 9 INJECTION INTRAMUSCULAR; INTRAVENOUS; SUBCUTANEOUS at 21:29

## 2019-04-21 RX ADMIN — SODIUM CHLORIDE 3 MILLILITER(S): 9 INJECTION INTRAMUSCULAR; INTRAVENOUS; SUBCUTANEOUS at 21:29

## 2019-04-21 RX ADMIN — ONDANSETRON 4 MILLIGRAM(S): 8 TABLET, FILM COATED ORAL at 22:51

## 2019-04-21 NOTE — ED PROVIDER NOTE - PROGRESS NOTE DETAILS
results d/w pt and her . Pt GI is Dr Ashley, last colposcopy was 2 years ago, unremarkable as per pt, to have repeat every 5 years. Patient aware of CT findings, will treat with abx for presumed colitis and she will f/u with Dr Ashley. results d/w pt and her . Pt GI is Dr Ashley, last colposcopy was 2 years ago, unremarkable as per pt, to have repeat every 5 years. Patient aware of CT findings, will treat with abx for presumed colitis and she will f/u with Dr Ashley.  Return instructions reviewed.

## 2019-04-21 NOTE — ED ADULT NURSE NOTE - OBJECTIVE STATEMENT
pt presents to ed a/ox3, amb with steady gait. pt reports N/V/D x4 days. pt reports symptoms began 4 days ago and have persisted, with increased weakness, dizziness, chills. pt denies fevers, CP, SOB. pt denies sick contacts. pt denies recent travel. Pt reports decreased PO intake due to nausea and vomiting.

## 2019-04-21 NOTE — ED PROVIDER NOTE - NSFOLLOWUPINSTRUCTIONS_ED_ALL_ED_FT
Follow up with Dr Ashley this week.  You will likely need colonoscopy when your symptoms resolve.  Augmentin twice daily x 10 days  Drink plenty of fluids  Return to ER for any further concerns or worsening symptoms      Diverticulitis    Diverticulitis is inflammation or infection of small pouches in your colon that form when you HAVE a condition called diverticulosis. This condition can range from mild to severe potentially leading to perforation or obstructions of your colon. Symptoms include abdominal pain, fever/chills, nausea, vomiting, diarrhea, constipation, or blood in your stool. If you were prescribed an antibiotic medicine, take it as told by your health care provider. Do not stop taking the antibiotic even if you start to feel better.    SEEK IMMEDIATE MEDICAL CARE IF YOU HAVE ANY OF THE FOLLOWING SYMPTOMS: worsening abdominal pain, high fever, inability to hold down liquids or medication, black or bloody stools, inability to pass gas, lightheadedness/dizziness, or a change in mental status.

## 2019-04-21 NOTE — ED ADULT NURSE NOTE - NSIMPLEMENTINTERV_GEN_ALL_ED
Implemented All Fall Risk Interventions:  [employer] to call system. Call bell, personal items and telephone within reach. Instruct patient to call for assistance. Room bathroom lighting operational. Non-slip footwear when patient is off stretcher. Physically safe environment: no spills, clutter or unnecessary equipment. Stretcher in lowest position, wheels locked, appropriate side rails in place. Provide visual cue, wrist band, yellow gown, etc. Monitor gait and stability. Monitor for mental status changes and reorient to person, place, and time. Review medications for side effects contributing to fall risk. Reinforce activity limits and safety measures with patient and family.

## 2019-04-21 NOTE — ED PROVIDER NOTE - OBJECTIVE STATEMENT
64 yo female with h/o anxiety, HTN, Hypothyroidism, IBS c/o 4 days of abdominal pain, cramping, nausea, vomiting and diarrhea.  No fever/chills.  Not tolerating PO for days.  Neighbor sick with similar.  Nonsmoker, nondrinker. PMD Vomero

## 2019-04-21 NOTE — ED PROVIDER NOTE - CARE PROVIDER_API CALL
Roge Ashley (MD)  Gastroenterology; Internal Medicine  [address] [facility], [place] [address]  Phone: [phone]  Fax: [phone]  Follow Up Time:

## 2019-04-21 NOTE — ED STATDOCS - PROGRESS NOTE DETAILS
Tomer NP for Dr. Bunn: 62 y/o female with a PMHx of Anxiety, hypothyroidism, HTN, IBS presents to the ED c/o diarrhea x4 days. Pt states that she first developed diarrhea 4 days ago. 2 days ago began to have nausea, vomiting and abd pain. Pt reports she has had intermittent chills and hot flashes. Urine is a dark color. States she has had decrease PO intake and has difficulty walking due to abd pain. Took an imodium without relief.

## 2019-04-22 VITALS
TEMPERATURE: 98 F | RESPIRATION RATE: 18 BRPM | DIASTOLIC BLOOD PRESSURE: 80 MMHG | SYSTOLIC BLOOD PRESSURE: 162 MMHG | OXYGEN SATURATION: 100 % | HEART RATE: 88 BPM

## 2019-04-22 RX ORDER — ONDANSETRON 8 MG/1
1 TABLET, FILM COATED ORAL
Qty: 12 | Refills: 0 | OUTPATIENT
Start: 2019-04-22

## 2019-04-22 RX ORDER — SODIUM CHLORIDE 9 MG/ML
1000 INJECTION INTRAMUSCULAR; INTRAVENOUS; SUBCUTANEOUS ONCE
Qty: 0 | Refills: 0 | Status: COMPLETED | OUTPATIENT
Start: 2019-04-22 | End: 2019-04-22

## 2019-04-22 RX ADMIN — Medication 100 MILLIEQUIVALENT(S): at 00:30

## 2019-04-22 RX ADMIN — SODIUM CHLORIDE 1000 MILLILITER(S): 9 INJECTION INTRAMUSCULAR; INTRAVENOUS; SUBCUTANEOUS at 01:33

## 2019-04-22 RX ADMIN — Medication 1 TABLET(S): at 01:59

## 2019-04-22 RX ADMIN — SODIUM CHLORIDE 2000 MILLILITER(S): 9 INJECTION INTRAMUSCULAR; INTRAVENOUS; SUBCUTANEOUS at 00:03

## 2019-04-23 LAB
CULTURE RESULTS: SIGNIFICANT CHANGE UP
SPECIMEN SOURCE: SIGNIFICANT CHANGE UP

## 2019-04-29 ENCOUNTER — APPOINTMENT (OUTPATIENT)
Dept: INTERNAL MEDICINE | Facility: CLINIC | Age: 64
End: 2019-04-29
Payer: COMMERCIAL

## 2019-04-29 VITALS
BODY MASS INDEX: 21.79 KG/M2 | WEIGHT: 111 LBS | SYSTOLIC BLOOD PRESSURE: 112 MMHG | HEIGHT: 60 IN | OXYGEN SATURATION: 97 % | DIASTOLIC BLOOD PRESSURE: 70 MMHG | HEART RATE: 74 BPM | TEMPERATURE: 97.7 F | RESPIRATION RATE: 16 BRPM

## 2019-04-29 PROCEDURE — 99215 OFFICE O/P EST HI 40 MIN: CPT

## 2019-04-30 LAB
ANION GAP SERPL CALC-SCNC: 13 MMOL/L
BASOPHILS # BLD AUTO: 0.03 K/UL
BASOPHILS NFR BLD AUTO: 0.5 %
BUN SERPL-MCNC: 8 MG/DL
CALCIUM SERPL-MCNC: 9.7 MG/DL
CHLORIDE SERPL-SCNC: 89 MMOL/L
CO2 SERPL-SCNC: 34 MMOL/L
CREAT SERPL-MCNC: 0.79 MG/DL
EOSINOPHIL # BLD AUTO: 0.09 K/UL
EOSINOPHIL NFR BLD AUTO: 1.4 %
GLUCOSE SERPL-MCNC: 95 MG/DL
HCT VFR BLD CALC: 39.6 %
HGB BLD-MCNC: 13 G/DL
IMM GRANULOCYTES NFR BLD AUTO: 0.2 %
LYMPHOCYTES # BLD AUTO: 1.26 K/UL
LYMPHOCYTES NFR BLD AUTO: 19 %
MAN DIFF?: NORMAL
MCHC RBC-ENTMCNC: 28.6 PG
MCHC RBC-ENTMCNC: 32.8 GM/DL
MCV RBC AUTO: 87.2 FL
MONOCYTES # BLD AUTO: 0.58 K/UL
MONOCYTES NFR BLD AUTO: 8.7 %
NEUTROPHILS # BLD AUTO: 4.66 K/UL
NEUTROPHILS NFR BLD AUTO: 70.2 %
PLATELET # BLD AUTO: 315 K/UL
POTASSIUM SERPL-SCNC: 3 MMOL/L
RBC # BLD: 4.54 M/UL
RBC # FLD: 13.4 %
SODIUM SERPL-SCNC: 136 MMOL/L
WBC # FLD AUTO: 6.63 K/UL

## 2019-05-01 NOTE — ASSESSMENT
[FreeTextEntry1] : 1.diarrhea:with possible diagnosis of colitis. Discuss with patient diagnosis and treatment plan. Will start on Cipro and Flagyl. Went over instructions and side effects of medication.\par Advised to continue Avila diet. Follow up with GI next week.\par Hyponatremia and hypokalemia due to vomiting and diarrhea. Recheck BMP and CBC.\par Increase fluids, rest. \par Call for new or worsening symptoms.\par Discussed signs and symptoms to warrant urgent evaluation with patient.\par \par

## 2019-05-01 NOTE — HISTORY OF PRESENT ILLNESS
[FreeTextEntry8] : Ms. DOMENICO TORRES is a 63 year F with PMHX of IBS who comes in for an acute visit.\par She recently went to  ED with c/o nausea, vomiting, abdominal pain and diarrhea. She was found to have low sodium and potassium, with CT abdomen/pelvis showing no obstruction or fluid, with prominent wall of cecum and proximal ascending colon without inflammatory change, due to possible partial distention or focal colitis/neoplasm. She was given rx for Augmentin but did not take it as she was confused as to her diagnosis.\par She continues to have loose, water stools, non bloody. She has not had nausea/vomiting, fever/chills. She notes generally not feeling well and continues to feel weak. She is continuing on a brat diet. She does have an appointment set up next week with her gastroenterologist . \par Patient denies any cp, sob, palpitations, constipation.\par

## 2019-05-02 ENCOUNTER — RX RENEWAL (OUTPATIENT)
Age: 64
End: 2019-05-02

## 2019-05-03 PROBLEM — K58.9 IRRITABLE BOWEL SYNDROME WITHOUT DIARRHEA: Chronic | Status: INACTIVE | Noted: 2019-04-21 | Resolved: 2019-05-02

## 2019-05-03 PROBLEM — K58.9 IRRITABLE BOWEL SYNDROME, UNSPECIFIED: Chronic | Status: INACTIVE | Noted: 2019-04-21 | Resolved: 2019-05-02

## 2019-05-09 LAB
ANION GAP SERPL CALC-SCNC: 12 MMOL/L
BUN SERPL-MCNC: 9 MG/DL
CALCIUM SERPL-MCNC: 10.1 MG/DL
CHLORIDE SERPL-SCNC: 97 MMOL/L
CO2 SERPL-SCNC: 34 MMOL/L
CREAT SERPL-MCNC: 0.72 MG/DL
GLUCOSE SERPL-MCNC: 83 MG/DL
POTASSIUM SERPL-SCNC: 3.3 MMOL/L
SODIUM SERPL-SCNC: 143 MMOL/L

## 2019-06-18 ENCOUNTER — MEDICATION RENEWAL (OUTPATIENT)
Age: 64
End: 2019-06-18

## 2019-06-26 ENCOUNTER — MEDICATION RENEWAL (OUTPATIENT)
Age: 64
End: 2019-06-26

## 2019-09-06 PROBLEM — K58.9 IRRITABLE BOWEL SYNDROME, UNSPECIFIED: Chronic | Status: ACTIVE | Noted: 2019-05-02

## 2019-09-06 PROBLEM — K58.9 IRRITABLE BOWEL SYNDROME WITHOUT DIARRHEA: Chronic | Status: ACTIVE | Noted: 2019-05-02

## 2019-09-16 ENCOUNTER — APPOINTMENT (OUTPATIENT)
Dept: INTERNAL MEDICINE | Facility: CLINIC | Age: 64
End: 2019-09-16
Payer: MEDICARE

## 2019-09-16 VITALS
HEART RATE: 84 BPM | HEIGHT: 60 IN | RESPIRATION RATE: 16 BRPM | SYSTOLIC BLOOD PRESSURE: 110 MMHG | OXYGEN SATURATION: 98 % | TEMPERATURE: 97.8 F | BODY MASS INDEX: 21.2 KG/M2 | WEIGHT: 108 LBS | DIASTOLIC BLOOD PRESSURE: 72 MMHG

## 2019-09-16 PROCEDURE — 99214 OFFICE O/P EST MOD 30 MIN: CPT

## 2019-09-16 RX ORDER — ZOLEDRONIC ACID 5 MG/100ML
5 INJECTION, SOLUTION INTRAVENOUS
Refills: 0 | Status: DISCONTINUED | COMMUNITY
End: 2019-09-16

## 2019-09-16 RX ORDER — CIPROFLOXACIN HYDROCHLORIDE 500 MG/1
500 TABLET, FILM COATED ORAL TWICE DAILY
Qty: 14 | Refills: 0 | Status: DISCONTINUED | COMMUNITY
Start: 2019-04-29 | End: 2019-09-16

## 2019-09-16 RX ORDER — DENOSUMAB 60 MG/ML
60 INJECTION SUBCUTANEOUS
Refills: 0 | Status: ACTIVE | COMMUNITY

## 2019-09-16 RX ORDER — METRONIDAZOLE 500 MG/1
500 TABLET ORAL 3 TIMES DAILY
Qty: 21 | Refills: 0 | Status: DISCONTINUED | COMMUNITY
Start: 2019-04-29 | End: 2019-09-16

## 2019-09-16 RX ORDER — POTASSIUM CHLORIDE 1.5 G/1.58G
20 POWDER, FOR SOLUTION ORAL TWICE DAILY
Qty: 2 | Refills: 1 | Status: DISCONTINUED | COMMUNITY
Start: 2019-04-30 | End: 2019-09-16

## 2019-09-16 RX ORDER — FLUTICASONE PROPIONATE 50 UG/1
50 SPRAY, METERED NASAL
Qty: 1 | Refills: 0 | Status: ACTIVE | COMMUNITY
Start: 2019-09-16 | End: 1900-01-01

## 2019-09-16 NOTE — PLAN
[FreeTextEntry1] : Start Doxycycline. Wear sunscreen and take with food.\par Start Flonase.\par Take benzonatate prn for cough.\par If symptoms do not get better or worsen call MD office.\par \par Yearly FBW ordered to be done week of physical appointment.  Patient to make appointment for yearly physical.

## 2019-09-16 NOTE — PHYSICAL EXAM
[Normal Outer Ear/Nose] : the outer ears and nose were normal in appearance [Normal TMs] : both tympanic membranes were normal [No Edema] : there was no peripheral edema [Normal] : normal rate, regular rhythm, normal S1 and S2 and no murmur heard [Alert and Oriented x3] : oriented to person, place, and time [Normal Affect] : the affect was normal [Normal Insight/Judgement] : insight and judgment were intact [de-identified] : Erythematous nasal mucosa. Erythematous pharynx with no exudate. Frontal and maxillary sinuses nontender upon palpation [de-identified] : b/l anterior cervical lymphadenopathy

## 2019-09-16 NOTE — HISTORY OF PRESENT ILLNESS
[FreeTextEntry8] : Patient comes in with c/o dry cough and post nasal drip x 1 week. Patient states she is hoarse and had a sore throat at the start of symptoms but it went away. Denies fever, chills, sinus and nasal congestion, rhinorrhea. Patient took Claritin with temporary relief. She thought it was allergies but she feels she is not getting better.

## 2019-09-19 ENCOUNTER — APPOINTMENT (OUTPATIENT)
Dept: INTERNAL MEDICINE | Facility: CLINIC | Age: 64
End: 2019-09-19

## 2019-10-02 ENCOUNTER — MEDICATION RENEWAL (OUTPATIENT)
Age: 64
End: 2019-10-02

## 2019-10-07 ENCOUNTER — EMERGENCY (EMERGENCY)
Facility: HOSPITAL | Age: 64
LOS: 0 days | Discharge: HOME | End: 2019-10-07
Attending: EMERGENCY MEDICINE
Payer: MEDICARE

## 2019-10-07 VITALS
SYSTOLIC BLOOD PRESSURE: 154 MMHG | TEMPERATURE: 97 F | OXYGEN SATURATION: 98 % | RESPIRATION RATE: 18 BRPM | DIASTOLIC BLOOD PRESSURE: 88 MMHG | HEART RATE: 80 BPM

## 2019-10-07 VITALS
RESPIRATION RATE: 20 BRPM | TEMPERATURE: 97 F | SYSTOLIC BLOOD PRESSURE: 164 MMHG | HEART RATE: 91 BPM | OXYGEN SATURATION: 96 % | DIASTOLIC BLOOD PRESSURE: 116 MMHG

## 2019-10-07 DIAGNOSIS — R19.7 DIARRHEA, UNSPECIFIED: ICD-10-CM

## 2019-10-07 DIAGNOSIS — E87.1 HYPO-OSMOLALITY AND HYPONATREMIA: ICD-10-CM

## 2019-10-07 DIAGNOSIS — R11.2 NAUSEA WITH VOMITING, UNSPECIFIED: ICD-10-CM

## 2019-10-07 DIAGNOSIS — E87.6 HYPOKALEMIA: ICD-10-CM

## 2019-10-07 DIAGNOSIS — R10.9 UNSPECIFIED ABDOMINAL PAIN: ICD-10-CM

## 2019-10-07 DIAGNOSIS — E03.9 HYPOTHYROIDISM, UNSPECIFIED: ICD-10-CM

## 2019-10-07 DIAGNOSIS — Z88.5 ALLERGY STATUS TO NARCOTIC AGENT: ICD-10-CM

## 2019-10-07 DIAGNOSIS — I10 ESSENTIAL (PRIMARY) HYPERTENSION: ICD-10-CM

## 2019-10-07 LAB
ALBUMIN SERPL ELPH-MCNC: 4.1 G/DL — SIGNIFICANT CHANGE UP (ref 3.3–5)
ALBUMIN SERPL ELPH-MCNC: 4.5 G/DL — SIGNIFICANT CHANGE UP (ref 3.3–5)
ALP SERPL-CCNC: 40 U/L — SIGNIFICANT CHANGE UP (ref 40–120)
ALP SERPL-CCNC: 42 U/L — SIGNIFICANT CHANGE UP (ref 40–120)
ALT FLD-CCNC: 22 U/L — SIGNIFICANT CHANGE UP (ref 12–78)
ALT FLD-CCNC: 24 U/L — SIGNIFICANT CHANGE UP (ref 12–78)
ANION GAP SERPL CALC-SCNC: 12 MMOL/L — SIGNIFICANT CHANGE UP (ref 5–17)
ANION GAP SERPL CALC-SCNC: 7 MMOL/L — SIGNIFICANT CHANGE UP (ref 5–17)
APPEARANCE UR: CLEAR — SIGNIFICANT CHANGE UP
AST SERPL-CCNC: 20 U/L — SIGNIFICANT CHANGE UP (ref 15–37)
AST SERPL-CCNC: 22 U/L — SIGNIFICANT CHANGE UP (ref 15–37)
BASOPHILS # BLD AUTO: 0.02 K/UL — SIGNIFICANT CHANGE UP (ref 0–0.2)
BASOPHILS NFR BLD AUTO: 0.3 % — SIGNIFICANT CHANGE UP (ref 0–2)
BILIRUB SERPL-MCNC: 0.6 MG/DL — SIGNIFICANT CHANGE UP (ref 0.2–1.2)
BILIRUB SERPL-MCNC: 0.6 MG/DL — SIGNIFICANT CHANGE UP (ref 0.2–1.2)
BILIRUB UR-MCNC: NEGATIVE — SIGNIFICANT CHANGE UP
BUN SERPL-MCNC: 10 MG/DL — SIGNIFICANT CHANGE UP (ref 7–23)
BUN SERPL-MCNC: 9 MG/DL — SIGNIFICANT CHANGE UP (ref 7–23)
CALCIUM SERPL-MCNC: 7.8 MG/DL — LOW (ref 8.5–10.1)
CALCIUM SERPL-MCNC: 8.7 MG/DL — SIGNIFICANT CHANGE UP (ref 8.5–10.1)
CHLORIDE SERPL-SCNC: 89 MMOL/L — LOW (ref 96–108)
CHLORIDE SERPL-SCNC: 96 MMOL/L — SIGNIFICANT CHANGE UP (ref 96–108)
CO2 SERPL-SCNC: 25 MMOL/L — SIGNIFICANT CHANGE UP (ref 22–31)
CO2 SERPL-SCNC: 28 MMOL/L — SIGNIFICANT CHANGE UP (ref 22–31)
COLOR SPEC: YELLOW — SIGNIFICANT CHANGE UP
CREAT SERPL-MCNC: 0.58 MG/DL — SIGNIFICANT CHANGE UP (ref 0.5–1.3)
CREAT SERPL-MCNC: 0.6 MG/DL — SIGNIFICANT CHANGE UP (ref 0.5–1.3)
DIFF PNL FLD: ABNORMAL
EOSINOPHIL # BLD AUTO: 0.01 K/UL — SIGNIFICANT CHANGE UP (ref 0–0.5)
EOSINOPHIL NFR BLD AUTO: 0.2 % — SIGNIFICANT CHANGE UP (ref 0–6)
GLUCOSE SERPL-MCNC: 103 MG/DL — HIGH (ref 70–99)
GLUCOSE SERPL-MCNC: 88 MG/DL — SIGNIFICANT CHANGE UP (ref 70–99)
GLUCOSE UR QL: NEGATIVE MG/DL — SIGNIFICANT CHANGE UP
HCT VFR BLD CALC: 40.4 % — SIGNIFICANT CHANGE UP (ref 34.5–45)
HGB BLD-MCNC: 13.7 G/DL — SIGNIFICANT CHANGE UP (ref 11.5–15.5)
IMM GRANULOCYTES NFR BLD AUTO: 0.3 % — SIGNIFICANT CHANGE UP (ref 0–1.5)
KETONES UR-MCNC: ABNORMAL
LEUKOCYTE ESTERASE UR-ACNC: NEGATIVE — SIGNIFICANT CHANGE UP
LIDOCAIN IGE QN: 67 U/L — LOW (ref 73–393)
LYMPHOCYTES # BLD AUTO: 0.76 K/UL — LOW (ref 1–3.3)
LYMPHOCYTES # BLD AUTO: 11.8 % — LOW (ref 13–44)
MCHC RBC-ENTMCNC: 28.3 PG — SIGNIFICANT CHANGE UP (ref 27–34)
MCHC RBC-ENTMCNC: 33.9 GM/DL — SIGNIFICANT CHANGE UP (ref 32–36)
MCV RBC AUTO: 83.5 FL — SIGNIFICANT CHANGE UP (ref 80–100)
MONOCYTES # BLD AUTO: 0.44 K/UL — SIGNIFICANT CHANGE UP (ref 0–0.9)
MONOCYTES NFR BLD AUTO: 6.8 % — SIGNIFICANT CHANGE UP (ref 2–14)
NEUTROPHILS # BLD AUTO: 5.19 K/UL — SIGNIFICANT CHANGE UP (ref 1.8–7.4)
NEUTROPHILS NFR BLD AUTO: 80.6 % — HIGH (ref 43–77)
NITRITE UR-MCNC: NEGATIVE — SIGNIFICANT CHANGE UP
PH UR: 7 — SIGNIFICANT CHANGE UP (ref 5–8)
PLATELET # BLD AUTO: 297 K/UL — SIGNIFICANT CHANGE UP (ref 150–400)
POTASSIUM SERPL-MCNC: 2.9 MMOL/L — CRITICAL LOW (ref 3.5–5.3)
POTASSIUM SERPL-MCNC: 3.3 MMOL/L — LOW (ref 3.5–5.3)
POTASSIUM SERPL-SCNC: 2.9 MMOL/L — CRITICAL LOW (ref 3.5–5.3)
POTASSIUM SERPL-SCNC: 3.3 MMOL/L — LOW (ref 3.5–5.3)
PROT SERPL-MCNC: 6.8 GM/DL — SIGNIFICANT CHANGE UP (ref 6–8.3)
PROT SERPL-MCNC: 7.7 GM/DL — SIGNIFICANT CHANGE UP (ref 6–8.3)
PROT UR-MCNC: 15 MG/DL
RBC # BLD: 4.84 M/UL — SIGNIFICANT CHANGE UP (ref 3.8–5.2)
RBC # FLD: 12.9 % — SIGNIFICANT CHANGE UP (ref 10.3–14.5)
SODIUM SERPL-SCNC: 126 MMOL/L — LOW (ref 135–145)
SODIUM SERPL-SCNC: 131 MMOL/L — LOW (ref 135–145)
SP GR SPEC: 1.01 — SIGNIFICANT CHANGE UP (ref 1.01–1.02)
UROBILINOGEN FLD QL: NEGATIVE MG/DL — SIGNIFICANT CHANGE UP
WBC # BLD: 6.44 K/UL — SIGNIFICANT CHANGE UP (ref 3.8–10.5)
WBC # FLD AUTO: 6.44 K/UL — SIGNIFICANT CHANGE UP (ref 3.8–10.5)

## 2019-10-07 PROCEDURE — 93005 ELECTROCARDIOGRAM TRACING: CPT

## 2019-10-07 PROCEDURE — 81001 URINALYSIS AUTO W/SCOPE: CPT

## 2019-10-07 PROCEDURE — 36415 COLL VENOUS BLD VENIPUNCTURE: CPT

## 2019-10-07 PROCEDURE — 85025 COMPLETE CBC W/AUTO DIFF WBC: CPT

## 2019-10-07 PROCEDURE — 93010 ELECTROCARDIOGRAM REPORT: CPT

## 2019-10-07 PROCEDURE — 99284 EMERGENCY DEPT VISIT MOD MDM: CPT | Mod: 25

## 2019-10-07 PROCEDURE — 99285 EMERGENCY DEPT VISIT HI MDM: CPT

## 2019-10-07 PROCEDURE — 80053 COMPREHEN METABOLIC PANEL: CPT | Mod: 91

## 2019-10-07 PROCEDURE — 96374 THER/PROPH/DIAG INJ IV PUSH: CPT

## 2019-10-07 PROCEDURE — 83690 ASSAY OF LIPASE: CPT

## 2019-10-07 RX ORDER — ONDANSETRON 8 MG/1
1 TABLET, FILM COATED ORAL
Qty: 6 | Refills: 0
Start: 2019-10-07 | End: 2019-10-08

## 2019-10-07 RX ORDER — SODIUM CHLORIDE 9 MG/ML
2000 INJECTION INTRAMUSCULAR; INTRAVENOUS; SUBCUTANEOUS ONCE
Refills: 0 | Status: COMPLETED | OUTPATIENT
Start: 2019-10-07 | End: 2019-10-07

## 2019-10-07 RX ORDER — ONDANSETRON 8 MG/1
4 TABLET, FILM COATED ORAL ONCE
Refills: 0 | Status: COMPLETED | OUTPATIENT
Start: 2019-10-07 | End: 2019-10-07

## 2019-10-07 RX ORDER — POTASSIUM CHLORIDE 20 MEQ
20 PACKET (EA) ORAL ONCE
Refills: 0 | Status: COMPLETED | OUTPATIENT
Start: 2019-10-07 | End: 2019-10-07

## 2019-10-07 RX ADMIN — Medication 20 MILLIEQUIVALENT(S): at 18:42

## 2019-10-07 RX ADMIN — ONDANSETRON 4 MILLIGRAM(S): 8 TABLET, FILM COATED ORAL at 15:51

## 2019-10-07 RX ADMIN — SODIUM CHLORIDE 2000 MILLILITER(S): 9 INJECTION INTRAMUSCULAR; INTRAVENOUS; SUBCUTANEOUS at 15:53

## 2019-10-07 RX ADMIN — Medication 20 MILLIEQUIVALENT(S): at 17:18

## 2019-10-07 NOTE — ED STATDOCS - CLINICAL SUMMARY MEDICAL DECISION MAKING FREE TEXT BOX
Plan: hydration, Zofran. Pt with hx of similar episodes with bowel prep. Plan: hydration, Zofran. Pt with hx of similar episodes with bowel prep.  Concerning for electrolyte losses

## 2019-10-07 NOTE — ED ADULT TRIAGE NOTE - CHIEF COMPLAINT QUOTE
patient presents in ED with abdominal pain and nausea that started today after drinking the prep for a colonoscopy. Patient did not have the procedure. Sent by Dr. Ashley

## 2019-10-07 NOTE — ED STATDOCS - PATIENT PORTAL LINK FT
You can access the FollowMyHealth Patient Portal offered by [facility] by registering at the following website: [email or web address]. By joining [employer]’s FollowMyHealth portal, you will also be able to view your health information using other applications (apps) compatible with our system.

## 2019-10-07 NOTE — ED STATDOCS - CARE PLAN
Principal Discharge DX:	Non-intractable vomiting with nausea  Secondary Diagnosis:	Diarrhea, unspecified type  Secondary Diagnosis:	Hypokalemia due to excessive gastrointestinal loss of potassium  Secondary Diagnosis:	Hyponatremia

## 2019-10-07 NOTE — ED STATDOCS - CARE PROVIDER_API CALL
Roge Ashley (MD)  Gastroenterology; Internal Medicine  [address] [facility], [place] [address]  Phone: [phone]  Fax: [phone]  Follow Up Time:

## 2019-10-07 NOTE — ED STATDOCS - SECONDARY DIAGNOSIS.
Diarrhea, unspecified type Hypokalemia due to excessive gastrointestinal loss of potassium Hyponatremia

## 2019-10-07 NOTE — ED STATDOCS - OBJECTIVE STATEMENT
63 y/o female with a PMHx of anxiety, hypothyroid, HTN, IBS presents to the ED c/o abd pain and nausea starting today. Pt notes pain and n/v began today after drinking the prep for a colonoscopy. Pt did not have procedure and was sent to ED by Dr. Ashley for evaluation. Pt with hx of similar episodes with bowel prep.  Allergies: Codeine. No other complaints at this time.

## 2019-10-07 NOTE — ED STATDOCS - NEUROLOGICAL, MLM
sensation is normal and strength is normal. Keystone Flap Text: The defect edges were debeveled with a #15 scalpel blade.  Given the location of the defect, shape of the defect a keystone flap was deemed most appropriate.  Using a sterile surgical marker, an appropriate keystone flap was drawn incorporating the defect, outlining the appropriate donor tissue and placing the expected incisions within the relaxed skin tension lines where possible. The area thus outlined was incised deep to adipose tissue with a #15 scalpel blade.  The skin margins were undermined to an appropriate distance in all directions around the primary defect and laterally outward around the flap utilizing iris scissors.

## 2019-10-07 NOTE — ED STATDOCS - NSFOLLOWUPINSTRUCTIONS_ED_ALL_ED_FT
Diarrhea, Adult  Diarrhea is when you pass loose and watery poop (stool) often. Diarrhea can make you feel weak and cause you to lose water in your body (get dehydrated). Losing water in your body can cause you to:  Feel tired and thirsty.Have a dry mouth.Go pee (urinate) less often.Diarrhea often lasts 2–3 days. However, it can last longer if it is a sign of something more serious. It is important to treat your diarrhea as told by your doctor.  Follow these instructions at home:  Eating and drinking     Image       Image   Follow these instructions as told by your doctor:  Take an ORS (oral rehydration solution). This is a drink that helps you replace fluids and minerals your body lost. It is sold at pharmacies and stores.Drink plenty of fluids, such as:  Water.Ice chips.Diluted fruit juice.Low-calorie sports drinks.Milk, if you want.Avoid drinking fluids that have a lot of sugar or caffeine in them.Eat bland, easy-to-digest foods in small amounts as you are able. These foods include:  Bananas.Applesauce.Rice.Low-fat (lean) meats.Toast.Crackers.Avoid alcohol.Avoid spicy or fatty foods.Medicines     Take over-the-counter and prescription medicines only as told by your doctor.If you were prescribed an antibiotic medicine, take it as told by your doctor. Do not stop using the antibiotic even if you start to feel better.General instructions     Image   Wash your hands often using soap and water. If soap and water are not available, use a hand . Others in your home should wash their hands as well. Hands should be washed:  After using the toilet or changing a diaper.Before preparing, cooking, or serving food.While caring for a sick person.While visiting someone in a hospital.Drink enough fluid to keep your pee (urine) pale yellow.Rest at home while you get better.Watch your condition for any changes.Take a warm bath to help with any burning or pain from having diarrhea.Keep all follow-up visits as told by your doctor. This is important.Contact a doctor if:  You have a fever.Your diarrhea gets worse.You have new symptoms.You cannot keep fluids down.You feel light-headed or dizzy.You have a headache.You have muscle cramps.Get help right away if:  You have chest pain.You feel very weak or you pass out (faint).You have bloody or black poop or poop that looks like tar.You have very bad pain, cramping, or bloating in your belly (abdomen).You have trouble breathing or you are breathing very quickly.Your heart is beating very quickly.Your skin feels cold and clammy.You feel confused.You have signs of losing too much water in your body, such as:  Dark pee, very little pee, or no pee.Cracked lips.Dry mouth.Sunken eyes.Sleepiness.Weakness.Summary  Diarrhea is when you pass loose and watery poop (stool) often.Diarrhea can make you feel weak and cause you to lose water in your body (get dehydrated).Take an ORS (oral rehydration solution). This is a drink that is sold at pharmacies and stores.Eat bland, easy-to-digest foods in small amounts as you are able.Contact a doctor if your condition gets worse. Get help right away if you have signs that you have lost too much water in your body.This information is not intended to replace advice given to you by your health care provider. Make sure you discuss any questions you have with your health care provider.    Document Released: 06/05/2009 Document Revised: 05/24/2019 Document Reviewed: 05/24/2019  [employer] Interactive Patient Education © 2019 [employer]

## 2019-10-14 ENCOUNTER — APPOINTMENT (OUTPATIENT)
Dept: OBGYN | Facility: CLINIC | Age: 64
End: 2019-10-14
Payer: MEDICARE

## 2019-10-14 VITALS
WEIGHT: 107 LBS | HEIGHT: 60 IN | BODY MASS INDEX: 21.01 KG/M2 | OXYGEN SATURATION: 98 % | DIASTOLIC BLOOD PRESSURE: 76 MMHG | HEART RATE: 76 BPM | SYSTOLIC BLOOD PRESSURE: 110 MMHG | RESPIRATION RATE: 18 BRPM

## 2019-10-14 PROCEDURE — G0101: CPT

## 2019-10-14 NOTE — PHYSICAL EXAM
[Awake] : awake [Alert] : alert [Acute Distress] : no acute distress [Mass] : no breast mass [Axillary LAD] : no axillary lymphadenopathy [Nipple Discharge] : no nipple discharge [Soft] : soft [Tender] : non tender [Oriented x3] : oriented to person, place, and time [Normal] : cervix [Atrophy] : atrophy [No Bleeding] : there was no active vaginal bleeding [Uterine Adnexae] : were not tender and not enlarged

## 2019-10-14 NOTE — COUNSELING
[Exercise] : exercise [Breast Self Exam] : breast self exam [Nutrition] : nutrition [Vitamins/Supplements] : vitamins/supplements [Bladder Hygiene] : bladder hygiene [Vulvar Hygiene] : vulvar hygiene

## 2019-10-22 ENCOUNTER — MEDICATION RENEWAL (OUTPATIENT)
Age: 64
End: 2019-10-22

## 2020-01-16 ENCOUNTER — APPOINTMENT (OUTPATIENT)
Dept: INTERNAL MEDICINE | Facility: CLINIC | Age: 65
End: 2020-01-16
Payer: MEDICARE

## 2020-01-16 VITALS
HEART RATE: 76 BPM | BODY MASS INDEX: 20.58 KG/M2 | TEMPERATURE: 98.1 F | DIASTOLIC BLOOD PRESSURE: 94 MMHG | RESPIRATION RATE: 18 BRPM | SYSTOLIC BLOOD PRESSURE: 142 MMHG | HEIGHT: 61 IN | WEIGHT: 109 LBS | OXYGEN SATURATION: 98 %

## 2020-01-16 VITALS — SYSTOLIC BLOOD PRESSURE: 128 MMHG | DIASTOLIC BLOOD PRESSURE: 78 MMHG

## 2020-01-16 DIAGNOSIS — Z01.419 ENCOUNTER FOR GYNECOLOGICAL EXAMINATION (GENERAL) (ROUTINE) W/OUT ABNORMAL FINDINGS: ICD-10-CM

## 2020-01-16 DIAGNOSIS — Z87.898 PERSONAL HISTORY OF OTHER SPECIFIED CONDITIONS: ICD-10-CM

## 2020-01-16 DIAGNOSIS — J06.9 ACUTE UPPER RESPIRATORY INFECTION, UNSPECIFIED: ICD-10-CM

## 2020-01-16 PROCEDURE — 99213 OFFICE O/P EST LOW 20 MIN: CPT

## 2020-01-16 RX ORDER — SULFACETAMIDE SODIUM 100 MG/ML
10 SUSPENSION TOPICAL TWICE DAILY
Qty: 1 | Refills: 2 | Status: COMPLETED | COMMUNITY
Start: 2017-05-02 | End: 2020-01-16

## 2020-01-16 RX ORDER — FLUOCINONIDE 0.05 MG/G
0.05 OINTMENT TOPICAL TWICE DAILY
Qty: 30 | Refills: 1 | Status: COMPLETED | COMMUNITY
Start: 2017-05-02 | End: 2020-01-16

## 2020-01-16 RX ORDER — BENZONATATE 100 MG/1
100 CAPSULE ORAL 3 TIMES DAILY
Qty: 30 | Refills: 0 | Status: COMPLETED | COMMUNITY
Start: 2019-09-16 | End: 2020-01-16

## 2020-01-16 RX ORDER — DOXYCYCLINE HYCLATE 100 MG/1
100 CAPSULE ORAL
Qty: 14 | Refills: 0 | Status: COMPLETED | COMMUNITY
Start: 2019-09-16 | End: 2020-01-16

## 2020-01-16 NOTE — HISTORY OF PRESENT ILLNESS
[FreeTextEntry1] : f/up  [de-identified] : Pt here for followup / medication renewals. She has been under a lot of stress lately , her sister has dementia and helps care for  her.  She has not been sleeping well and is requesting a sleep medication. She has a history of anxiety, uses Xanax infrequently . She denies fever, chills, chest pain, palpitations, shortness of breath, abdominal pain. \par

## 2020-01-16 NOTE — ASSESSMENT
[FreeTextEntry1] : Medications renewed as requested\par Trazodone 50 mg po PRN sleep ordered \par Needs Shingrix, she will call her insurance company before scheduling \par Script  for  fasting comprehensive BW to be completed \par CPE scheduled with DR Telles, 4./20

## 2020-01-16 NOTE — PHYSICAL EXAM
[No Acute Distress] : no acute distress [Well Nourished] : well nourished [Normal Oropharynx] : the oropharynx was normal [Normal TMs] : both tympanic membranes were normal [No Lymphadenopathy] : no lymphadenopathy [Supple] : supple [No Accessory Muscle Use] : no accessory muscle use [Clear to Auscultation] : lungs were clear to auscultation bilaterally [Normal Rate] : normal rate  [Regular Rhythm] : with a regular rhythm [Normal Posterior Cervical Nodes] : no posterior cervical lymphadenopathy [Coordination Grossly Intact] : coordination grossly intact [No Focal Deficits] : no focal deficits [Normal Gait] : normal gait [Normal Affect] : the affect was normal [Alert and Oriented x3] : oriented to person, place, and time [Normal S1, S2] : normal S1 and S2

## 2020-01-16 NOTE — REVIEW OF SYSTEMS
[Anxiety] : anxiety [Insomnia] : insomnia [Negative] : Integumentary [Suicidal] : not suicidal [Fatigue] : no fatigue [Depression] : no depression

## 2020-01-22 ENCOUNTER — APPOINTMENT (OUTPATIENT)
Dept: INTERNAL MEDICINE | Facility: CLINIC | Age: 65
End: 2020-01-22

## 2020-07-16 LAB
25(OH)D3 SERPL-MCNC: 31.3 NG/ML
ALBUMIN SERPL ELPH-MCNC: 4.8 G/DL
ALP BLD-CCNC: 42 U/L
ALT SERPL-CCNC: 15 U/L
ANION GAP SERPL CALC-SCNC: 15 MMOL/L
APPEARANCE: CLEAR
AST SERPL-CCNC: 19 U/L
BACTERIA: NEGATIVE
BASOPHILS # BLD AUTO: 0.05 K/UL
BASOPHILS NFR BLD AUTO: 0.9 %
BILIRUB SERPL-MCNC: 0.4 MG/DL
BILIRUBIN URINE: NEGATIVE
BLOOD URINE: NEGATIVE
BUN SERPL-MCNC: 12 MG/DL
CALCIUM SERPL-MCNC: 9.3 MG/DL
CHLORIDE SERPL-SCNC: 94 MMOL/L
CHOLEST SERPL-MCNC: 211 MG/DL
CHOLEST/HDLC SERPL: 2.8 RATIO
CO2 SERPL-SCNC: 26 MMOL/L
COLOR: NORMAL
CREAT SERPL-MCNC: 0.71 MG/DL
EOSINOPHIL # BLD AUTO: 0.11 K/UL
EOSINOPHIL NFR BLD AUTO: 2 %
GLUCOSE QUALITATIVE U: NEGATIVE
GLUCOSE SERPL-MCNC: 85 MG/DL
HCT VFR BLD CALC: 41.8 %
HDLC SERPL-MCNC: 74 MG/DL
HGB BLD-MCNC: 13.5 G/DL
HYALINE CASTS: 1 /LPF
IMM GRANULOCYTES NFR BLD AUTO: 0.2 %
IRON SERPL-MCNC: 101 UG/DL
KETONES URINE: NEGATIVE
LDLC SERPL CALC-MCNC: 116 MG/DL
LEUKOCYTE ESTERASE URINE: NEGATIVE
LYMPHOCYTES # BLD AUTO: 1.52 K/UL
LYMPHOCYTES NFR BLD AUTO: 27.7 %
MAN DIFF?: NORMAL
MCHC RBC-ENTMCNC: 29.4 PG
MCHC RBC-ENTMCNC: 32.3 GM/DL
MCV RBC AUTO: 91.1 FL
MICROSCOPIC-UA: NORMAL
MONOCYTES # BLD AUTO: 0.57 K/UL
MONOCYTES NFR BLD AUTO: 10.4 %
NEUTROPHILS # BLD AUTO: 3.22 K/UL
NEUTROPHILS NFR BLD AUTO: 58.8 %
NITRITE URINE: NEGATIVE
PH URINE: 8.5
PLATELET # BLD AUTO: 285 K/UL
POTASSIUM SERPL-SCNC: 3.7 MMOL/L
PROT SERPL-MCNC: 6.6 G/DL
PROTEIN URINE: NORMAL
RBC # BLD: 4.59 M/UL
RBC # FLD: 13.7 %
RED BLOOD CELLS URINE: 9 /HPF
SODIUM SERPL-SCNC: 135 MMOL/L
SPECIFIC GRAVITY URINE: 1.02
SQUAMOUS EPITHELIAL CELLS: 1 /HPF
TRIGL SERPL-MCNC: 104 MG/DL
UROBILINOGEN URINE: NORMAL
WBC # FLD AUTO: 5.48 K/UL
WHITE BLOOD CELLS URINE: 1 /HPF

## 2020-07-21 ENCOUNTER — APPOINTMENT (OUTPATIENT)
Dept: INTERNAL MEDICINE | Facility: CLINIC | Age: 65
End: 2020-07-21
Payer: MEDICARE

## 2020-07-21 VITALS
OXYGEN SATURATION: 96 % | TEMPERATURE: 98.7 F | RESPIRATION RATE: 18 BRPM | WEIGHT: 112 LBS | HEIGHT: 64 IN | DIASTOLIC BLOOD PRESSURE: 90 MMHG | SYSTOLIC BLOOD PRESSURE: 120 MMHG | HEART RATE: 83 BPM | BODY MASS INDEX: 19.12 KG/M2

## 2020-07-21 DIAGNOSIS — Z87.19 PERSONAL HISTORY OF OTHER DISEASES OF THE DIGESTIVE SYSTEM: ICD-10-CM

## 2020-07-21 DIAGNOSIS — G47.00 INSOMNIA, UNSPECIFIED: ICD-10-CM

## 2020-07-21 DIAGNOSIS — Z87.898 PERSONAL HISTORY OF OTHER SPECIFIED CONDITIONS: ICD-10-CM

## 2020-07-21 DIAGNOSIS — M54.42 LUMBAGO WITH SCIATICA, LEFT SIDE: ICD-10-CM

## 2020-07-21 DIAGNOSIS — Z86.19 PERSONAL HISTORY OF OTHER INFECTIOUS AND PARASITIC DISEASES: ICD-10-CM

## 2020-07-21 DIAGNOSIS — Z87.2 PERSONAL HISTORY OF DISEASES OF THE SKIN AND SUBCUTANEOUS TISSUE: ICD-10-CM

## 2020-07-21 DIAGNOSIS — N95.2 POSTMENOPAUSAL ATROPHIC VAGINITIS: ICD-10-CM

## 2020-07-21 DIAGNOSIS — E87.1 HYPO-OSMOLALITY AND HYPONATREMIA: ICD-10-CM

## 2020-07-21 PROCEDURE — 90732 PPSV23 VACC 2 YRS+ SUBQ/IM: CPT

## 2020-07-21 PROCEDURE — G0009: CPT

## 2020-07-21 PROCEDURE — G0438: CPT

## 2020-07-21 RX ORDER — GABAPENTIN 300 MG/1
300 CAPSULE ORAL
Qty: 60 | Refills: 0 | Status: ACTIVE | COMMUNITY
Start: 2020-07-08

## 2020-07-21 RX ORDER — FLUOROURACIL 50 MG/G
5 CREAM TOPICAL
Qty: 40 | Refills: 0 | Status: ACTIVE | COMMUNITY
Start: 2020-02-12

## 2020-07-21 RX ORDER — MOMETASONE FUROATE 1 MG/G
0.1 CREAM TOPICAL
Qty: 15 | Refills: 0 | Status: ACTIVE | COMMUNITY
Start: 2020-02-12

## 2020-07-21 RX ORDER — GABAPENTIN 100 MG
100 TABLET ORAL AT BEDTIME
Refills: 0 | Status: DISCONTINUED | COMMUNITY
End: 2020-07-21

## 2020-07-21 NOTE — HEALTH RISK ASSESSMENT
[Yes] : Yes [0] : 2) Feeling down, depressed, or hopeless: Not at all (0) [Smoke Detector] : smoke detector [Carbon Monoxide Detector] : carbon monoxide detector [Seat Belt] :  uses seat belt [Sunscreen] : uses sunscreen [Very Good] : ~his/her~  mood as very good [No] : In the past 12 months have you used drugs other than those required for medical reasons? No [No falls in past year] : Patient reported no falls in the past year [] : No [de-identified] : occasionally [EyeExamDate] : 07/20 [MammogramDate] : 08/19 [Guns at Home] : no guns at home [BoneDensityDate] : 01/20

## 2020-07-21 NOTE — PLAN
[FreeTextEntry1] : Mrs. Elizondo presents for an annual physical examination. Medications have been reviewed and revised. Comprehensive blood profile was reviewed with patient. She was seen in followup with her gynecologist regarding mammogram and bone density testing. Mrs. Elizondo will be given the Pneumovax 23 vaccine. She has a history of insomnia. Trazodone will be increased to 100 mg h.s. p.r.n. for sleep. Patient will followup in 6 months.

## 2020-07-21 NOTE — PHYSICAL EXAM
[No Acute Distress] : no acute distress [Well Nourished] : well nourished [Well Developed] : well developed [Normal Sclera/Conjunctiva] : normal sclera/conjunctiva [Well-Appearing] : well-appearing [PERRL] : pupils equal round and reactive to light [EOMI] : extraocular movements intact [Normal Outer Ear/Nose] : the outer ears and nose were normal in appearance [Normal Oropharynx] : the oropharynx was normal [No JVD] : no jugular venous distention [No Lymphadenopathy] : no lymphadenopathy [Supple] : supple [No Respiratory Distress] : no respiratory distress  [Thyroid Normal, No Nodules] : the thyroid was normal and there were no nodules present [Clear to Auscultation] : lungs were clear to auscultation bilaterally [Normal Rate] : normal rate  [No Accessory Muscle Use] : no accessory muscle use [Normal S1, S2] : normal S1 and S2 [Regular Rhythm] : with a regular rhythm [No Murmur] : no murmur heard [No Varicosities] : no varicosities [No Abdominal Bruit] : a ~M bruit was not heard ~T in the abdomen [No Carotid Bruits] : no carotid bruits [Pedal Pulses Present] : the pedal pulses are present [No Edema] : there was no peripheral edema [No Extremity Clubbing/Cyanosis] : no extremity clubbing/cyanosis [No Palpable Aorta] : no palpable aorta [Non-distended] : non-distended [Non Tender] : non-tender [Soft] : abdomen soft [No Masses] : no abdominal mass palpated [Normal Bowel Sounds] : normal bowel sounds [No HSM] : no HSM [Normal Posterior Cervical Nodes] : no posterior cervical lymphadenopathy [No CVA Tenderness] : no CVA  tenderness [Normal Anterior Cervical Nodes] : no anterior cervical lymphadenopathy [No Spinal Tenderness] : no spinal tenderness [No Joint Swelling] : no joint swelling [Grossly Normal Strength/Tone] : grossly normal strength/tone [No Rash] : no rash [Coordination Grossly Intact] : coordination grossly intact [No Focal Deficits] : no focal deficits [Normal Gait] : normal gait [Deep Tendon Reflexes (DTR)] : deep tendon reflexes were 2+ and symmetric [Normal Affect] : the affect was normal [Normal Insight/Judgement] : insight and judgment were intact

## 2020-07-21 NOTE — HISTORY OF PRESENT ILLNESS
[FreeTextEntry1] : annual physical examination [de-identified] : Mrs. Elizondo presents for annual physical examination. She is feeling well. Patient denies any chest pain, shortness of breath or palpitations. She has a history of hypertension and continues on losartan and hydrochlorothiazide. She is also currently on thyroid supplementation. Mrs. Elizondo has no nocturnal symptoms of cough or shortness of breath. She denies any hematuria dysuria. She does followup with her gynecologist for mammogram and bone density testing. She does have a history of insomnia. Previously she was on trazodone 50 mg daily have her stop taking in December because was ineffective.

## 2020-10-27 ENCOUNTER — RX RENEWAL (OUTPATIENT)
Age: 65
End: 2020-10-27

## 2020-10-30 NOTE — ED PROVIDER NOTE - NSTIMEPROVIDERCAREINITIATE_GEN_ER
MD Heather Pop LPN    Caller: Unspecified (2 days ago,  3:48 PM)               You can change it on the form 21-Apr-2019 20:37

## 2020-11-23 ENCOUNTER — APPOINTMENT (OUTPATIENT)
Dept: OBGYN | Facility: CLINIC | Age: 65
End: 2020-11-23
Payer: MEDICARE

## 2020-11-23 VITALS
SYSTOLIC BLOOD PRESSURE: 132 MMHG | TEMPERATURE: 97.3 F | HEIGHT: 64 IN | RESPIRATION RATE: 16 BRPM | DIASTOLIC BLOOD PRESSURE: 68 MMHG | BODY MASS INDEX: 18.27 KG/M2 | WEIGHT: 107 LBS

## 2020-11-23 DIAGNOSIS — Z15.01 GENETIC SUSCEPTIBILITY TO MALIGNANT NEOPLASM OF BREAST: ICD-10-CM

## 2020-11-23 PROCEDURE — G0101: CPT

## 2020-11-23 NOTE — PHYSICAL EXAM
[Appropriately responsive] : appropriately responsive [Alert] : alert [No Acute Distress] : no acute distress [No Lymphadenopathy] : no lymphadenopathy [Regular Rate Rhythm] : regular rate rhythm [No Murmurs] : no murmurs [Clear to Auscultation B/L] : clear to auscultation bilaterally [Soft] : soft [Non-tender] : non-tender [Non-distended] : non-distended [No HSM] : No HSM [No Lesions] : no lesions [No Mass] : no mass [Oriented x3] : oriented x3 [Examination Of The Breasts] : a normal appearance [No Masses] : no breast masses were palpable [Labia Majora] : normal [Labia Minora] : normal [Uterine Adnexae] : normal [Normal rectal exam] : was normal [Normal Brown Stool] : was normal and brown [Normal] : was normal [None] : there was no rectal mass  [Occult Blood Positive] : was negative for occult blood analysis [Internal Hemorrhoid] : no internal hemorrhoids were present [External Hemorrhoid] : no external hemorrhoids were present [Skin Tags] : no residual hemorrhoidal skin tags

## 2020-11-23 NOTE — PLAN
[FreeTextEntry1] : 64 YO FEMALE PRESENTS FOR ANNUAL GYN EXAMINATION. SELF BREAST EXAMINATION TAUGHT. MAMMOGRAM ORDERED. EXERCISE, CALCIUM AND VITAMIN D3 SUPPLEMENTATION DISCUSSED. BONE DENSITY STUDY AND INDICATIONS REVIEWED. COLONOSCOPY HISTORY REVIEWED AND DISCUSSED FOLLOWUP.

## 2021-01-07 ENCOUNTER — APPOINTMENT (OUTPATIENT)
Dept: INTERNAL MEDICINE | Facility: CLINIC | Age: 66
End: 2021-01-07
Payer: MEDICARE

## 2021-01-07 VITALS
DIASTOLIC BLOOD PRESSURE: 90 MMHG | TEMPERATURE: 98 F | HEIGHT: 64 IN | WEIGHT: 105 LBS | SYSTOLIC BLOOD PRESSURE: 140 MMHG | OXYGEN SATURATION: 98 % | RESPIRATION RATE: 16 BRPM | HEART RATE: 76 BPM | BODY MASS INDEX: 17.93 KG/M2

## 2021-01-07 DIAGNOSIS — I34.1 NONRHEUMATIC MITRAL (VALVE) PROLAPSE: ICD-10-CM

## 2021-01-07 PROCEDURE — 99214 OFFICE O/P EST MOD 30 MIN: CPT

## 2021-01-07 RX ORDER — OMEPRAZOLE AND SODIUM BICARBONATE 40; 1680 MG/1; MG/1
40-1680 POWDER, FOR SUSPENSION ORAL
Refills: 0 | Status: DISCONTINUED | COMMUNITY
End: 2021-01-07

## 2021-01-07 RX ORDER — ESOMEPRAZOLE MAGNESIUM 40 MG/1
40 CAPSULE, DELAYED RELEASE ORAL TWICE DAILY
Refills: 0 | Status: ACTIVE | COMMUNITY

## 2021-01-07 NOTE — PLAN
[FreeTextEntry1] : Mrs. Arango presents for a followup evaluation. She will continue on Synthroid 75 mcg daily. She has a history of hypothyroidism. She does followup with her endocrinologist who monitors TSH level. Ms. Elizondo will continue on antihypertensive medication. She will be sent for a basic metabolic profile to check her renal function including BUN and creatinine. Mrs. Elizondo will continue on her other medications and followup in 6 months. She is up-to-date on current vaccines.

## 2021-01-07 NOTE — HISTORY OF PRESENT ILLNESS
[FreeTextEntry1] : Followup evaluation [de-identified] : Mrs. Elizondo presents for a followup evaluation. She is feeling well. The patient denies any chest pain, shortness of breath palpitations. She continues on was started on hydrochlorothiazide 100/25 mg daily for treatment of hypertension. She has no nocturnal symptoms of cough or shortness of breath.

## 2021-01-11 LAB
ANION GAP SERPL CALC-SCNC: 12 MMOL/L
BUN SERPL-MCNC: 10 MG/DL
CALCIUM SERPL-MCNC: 10.1 MG/DL
CHLORIDE SERPL-SCNC: 93 MMOL/L
CO2 SERPL-SCNC: 28 MMOL/L
CREAT SERPL-MCNC: 0.73 MG/DL
GLUCOSE SERPL-MCNC: 91 MG/DL
POTASSIUM SERPL-SCNC: 3.7 MMOL/L
SODIUM SERPL-SCNC: 133 MMOL/L

## 2021-01-13 ENCOUNTER — NON-APPOINTMENT (OUTPATIENT)
Age: 66
End: 2021-01-13

## 2021-01-23 ENCOUNTER — RX RENEWAL (OUTPATIENT)
Age: 66
End: 2021-01-23

## 2021-01-26 ENCOUNTER — RX RENEWAL (OUTPATIENT)
Age: 66
End: 2021-01-26

## 2021-04-25 ENCOUNTER — TRANSCRIPTION ENCOUNTER (OUTPATIENT)
Age: 66
End: 2021-04-25

## 2021-07-06 ENCOUNTER — APPOINTMENT (OUTPATIENT)
Dept: INTERNAL MEDICINE | Facility: CLINIC | Age: 66
End: 2021-07-06
Payer: MEDICARE

## 2021-07-06 VITALS
HEIGHT: 64 IN | HEART RATE: 79 BPM | WEIGHT: 104 LBS | OXYGEN SATURATION: 95 % | RESPIRATION RATE: 16 BRPM | TEMPERATURE: 98.5 F | BODY MASS INDEX: 17.75 KG/M2 | DIASTOLIC BLOOD PRESSURE: 82 MMHG | SYSTOLIC BLOOD PRESSURE: 132 MMHG

## 2021-07-06 DIAGNOSIS — L30.9 DERMATITIS, UNSPECIFIED: ICD-10-CM

## 2021-07-06 DIAGNOSIS — Z86.19 PERSONAL HISTORY OF OTHER INFECTIOUS AND PARASITIC DISEASES: ICD-10-CM

## 2021-07-06 PROCEDURE — 99214 OFFICE O/P EST MOD 30 MIN: CPT

## 2021-07-06 RX ORDER — TRAZODONE HYDROCHLORIDE 100 MG/1
100 TABLET ORAL
Qty: 30 | Refills: 4 | Status: DISCONTINUED | COMMUNITY
Start: 2020-01-16 | End: 2021-07-06

## 2021-07-06 NOTE — PLAN
[FreeTextEntry1] : Mrs. Elizondo presents for a follow up evaluation.\par \par #1. Patient will be started on Lexapro 10 mg daily for treatment of anxiety. She will continue to use Xanax on an as-needed basis.\par #2. Patient has been given a prescription for a CBC, comprehensive metabolic panel with hemoglobin A1c, iron level, lipid profile, TSH level and urinalysis.\par #3. Mrs. Elizondo will followup in 6 weeks time to assess response to Lexapro therapy.

## 2021-07-06 NOTE — HISTORY OF PRESENT ILLNESS
[FreeTextEntry1] : followup [de-identified] : Ms. Elizondo presents for followup evaluation. She denies any chest pain, shortness of breath and palpitations. She has no hematuria or dysuria. Ms. Elizondo does have anxiety. Her 37-year-old son lives at home. He is apparently a drug user and alcohol abuser. He has been arrested multiple times. He has become physically violent with his parents in the past. Mrs. Elizondo states that she will be seeking a quarter order to have them removed from the house. She is having some feelings of anxiety with depression. She has no suicidal ideation.

## 2021-10-19 ENCOUNTER — RX RENEWAL (OUTPATIENT)
Age: 66
End: 2021-10-19

## 2021-10-28 ENCOUNTER — NON-APPOINTMENT (OUTPATIENT)
Age: 66
End: 2021-10-28

## 2021-10-29 ENCOUNTER — EMERGENCY (EMERGENCY)
Facility: HOSPITAL | Age: 66
LOS: 0 days | Discharge: HOME | End: 2021-10-29
Attending: EMERGENCY MEDICINE
Payer: MEDICARE

## 2021-10-29 VITALS
TEMPERATURE: 98 F | DIASTOLIC BLOOD PRESSURE: 113 MMHG | OXYGEN SATURATION: 100 % | RESPIRATION RATE: 18 BRPM | SYSTOLIC BLOOD PRESSURE: 212 MMHG | HEART RATE: 103 BPM | HEIGHT: 61 IN

## 2021-10-29 VITALS
OXYGEN SATURATION: 100 % | TEMPERATURE: 98 F | DIASTOLIC BLOOD PRESSURE: 80 MMHG | SYSTOLIC BLOOD PRESSURE: 160 MMHG | RESPIRATION RATE: 16 BRPM | HEART RATE: 100 BPM

## 2021-10-29 DIAGNOSIS — I10 ESSENTIAL (PRIMARY) HYPERTENSION: ICD-10-CM

## 2021-10-29 DIAGNOSIS — E03.9 HYPOTHYROIDISM, UNSPECIFIED: ICD-10-CM

## 2021-10-29 DIAGNOSIS — Z88.5 ALLERGY STATUS TO NARCOTIC AGENT: ICD-10-CM

## 2021-10-29 DIAGNOSIS — F41.9 ANXIETY DISORDER, UNSPECIFIED: ICD-10-CM

## 2021-10-29 LAB
ALBUMIN SERPL ELPH-MCNC: 4.1 G/DL — SIGNIFICANT CHANGE UP (ref 3.3–5)
ALP SERPL-CCNC: 59 U/L — SIGNIFICANT CHANGE UP (ref 40–120)
ALT FLD-CCNC: 28 U/L — SIGNIFICANT CHANGE UP (ref 12–78)
ANION GAP SERPL CALC-SCNC: 6 MMOL/L — SIGNIFICANT CHANGE UP (ref 5–17)
AST SERPL-CCNC: 21 U/L — SIGNIFICANT CHANGE UP (ref 15–37)
BASOPHILS # BLD AUTO: 0.05 K/UL — SIGNIFICANT CHANGE UP (ref 0–0.2)
BASOPHILS NFR BLD AUTO: 0.7 % — SIGNIFICANT CHANGE UP (ref 0–2)
BILIRUB SERPL-MCNC: 0.3 MG/DL — SIGNIFICANT CHANGE UP (ref 0.2–1.2)
BUN SERPL-MCNC: 14 MG/DL — SIGNIFICANT CHANGE UP (ref 7–23)
CALCIUM SERPL-MCNC: 9.2 MG/DL — SIGNIFICANT CHANGE UP (ref 8.5–10.1)
CHLORIDE SERPL-SCNC: 96 MMOL/L — SIGNIFICANT CHANGE UP (ref 96–108)
CO2 SERPL-SCNC: 30 MMOL/L — SIGNIFICANT CHANGE UP (ref 22–31)
CREAT SERPL-MCNC: 0.69 MG/DL — SIGNIFICANT CHANGE UP (ref 0.5–1.3)
EOSINOPHIL # BLD AUTO: 0.16 K/UL — SIGNIFICANT CHANGE UP (ref 0–0.5)
EOSINOPHIL NFR BLD AUTO: 2.1 % — SIGNIFICANT CHANGE UP (ref 0–6)
GLUCOSE SERPL-MCNC: 100 MG/DL — HIGH (ref 70–99)
HCT VFR BLD CALC: 40.5 % — SIGNIFICANT CHANGE UP (ref 34.5–45)
HGB BLD-MCNC: 13.6 G/DL — SIGNIFICANT CHANGE UP (ref 11.5–15.5)
IMM GRANULOCYTES NFR BLD AUTO: 0.1 % — SIGNIFICANT CHANGE UP (ref 0–1.5)
LYMPHOCYTES # BLD AUTO: 2.59 K/UL — SIGNIFICANT CHANGE UP (ref 1–3.3)
LYMPHOCYTES # BLD AUTO: 34.3 % — SIGNIFICANT CHANGE UP (ref 13–44)
MCHC RBC-ENTMCNC: 29.4 PG — SIGNIFICANT CHANGE UP (ref 27–34)
MCHC RBC-ENTMCNC: 33.6 GM/DL — SIGNIFICANT CHANGE UP (ref 32–36)
MCV RBC AUTO: 87.7 FL — SIGNIFICANT CHANGE UP (ref 80–100)
MONOCYTES # BLD AUTO: 0.84 K/UL — SIGNIFICANT CHANGE UP (ref 0–0.9)
MONOCYTES NFR BLD AUTO: 11.1 % — SIGNIFICANT CHANGE UP (ref 2–14)
NEUTROPHILS # BLD AUTO: 3.9 K/UL — SIGNIFICANT CHANGE UP (ref 1.8–7.4)
NEUTROPHILS NFR BLD AUTO: 51.7 % — SIGNIFICANT CHANGE UP (ref 43–77)
PLATELET # BLD AUTO: 293 K/UL — SIGNIFICANT CHANGE UP (ref 150–400)
POTASSIUM SERPL-MCNC: 3.1 MMOL/L — LOW (ref 3.5–5.3)
POTASSIUM SERPL-SCNC: 3.1 MMOL/L — LOW (ref 3.5–5.3)
PROT SERPL-MCNC: 7.2 GM/DL — SIGNIFICANT CHANGE UP (ref 6–8.3)
RBC # BLD: 4.62 M/UL — SIGNIFICANT CHANGE UP (ref 3.8–5.2)
RBC # FLD: 13.2 % — SIGNIFICANT CHANGE UP (ref 10.3–14.5)
SODIUM SERPL-SCNC: 132 MMOL/L — LOW (ref 135–145)
TSH SERPL-MCNC: 4.39 UU/ML — SIGNIFICANT CHANGE UP (ref 0.34–4.82)
WBC # BLD: 7.55 K/UL — SIGNIFICANT CHANGE UP (ref 3.8–10.5)
WBC # FLD AUTO: 7.55 K/UL — SIGNIFICANT CHANGE UP (ref 3.8–10.5)

## 2021-10-29 PROCEDURE — 99284 EMERGENCY DEPT VISIT MOD MDM: CPT

## 2021-10-29 PROCEDURE — 96374 THER/PROPH/DIAG INJ IV PUSH: CPT

## 2021-10-29 PROCEDURE — 36415 COLL VENOUS BLD VENIPUNCTURE: CPT

## 2021-10-29 PROCEDURE — 80053 COMPREHEN METABOLIC PANEL: CPT

## 2021-10-29 PROCEDURE — 93005 ELECTROCARDIOGRAM TRACING: CPT

## 2021-10-29 PROCEDURE — 99284 EMERGENCY DEPT VISIT MOD MDM: CPT | Mod: 25

## 2021-10-29 PROCEDURE — 85025 COMPLETE CBC W/AUTO DIFF WBC: CPT

## 2021-10-29 PROCEDURE — 84443 ASSAY THYROID STIM HORMONE: CPT

## 2021-10-29 PROCEDURE — 93010 ELECTROCARDIOGRAM REPORT: CPT

## 2021-10-29 RX ADMIN — Medication 1 MILLIGRAM(S): at 01:24

## 2021-10-29 NOTE — ED PROVIDER NOTE - OBJECTIVE STATEMENT
66 y f pmh of HTN, hypothyroidism and anxiety presenting with high blood pressure.  Noted it earlier today at doctors office and took it again tonight and noted it was still elevated. Denies cp/sob/n/v/d/ha/numbness/weakness/tingling.  NO changes to bp meds.  Compliant with meds.  Feels she is having some element of anxiety presently as she is undergoing significant stress with son's drug addiction and surrounding circumstances.

## 2021-10-29 NOTE — ED PROVIDER NOTE - CLINICAL SUMMARY MEDICAL DECISION MAKING FREE TEXT BOX
Pt with asymptomatic HTN.  EKG with pvcs, but otherwise unremarkable and patient without cp - no indication for cardiac enzymes.  Feels quite stressed and believes this may be adding to BP issues.  Dosed with ativan here.  Pt feels much more relaxed.  BP significantly improved. TSH and basic labs/electrolytes unremarkable. Pt has pmd, Dr. Telles, with whom she can f/u.  As per ACEP guidelines, no further w/u required for asymptomatic htn.  Comfortable with d/c home.  REturn precautions discussed.  D/c home with strict return precautions and prompt outpatient f/u.

## 2021-10-29 NOTE — ED PROVIDER NOTE - CARE PROVIDER_API CALL
David Telles)  Internal Medicine; Pulmonary Disease  [address] [place] [address]  Phone: [phone]  Fax: [phone]  Follow Up Time: 1-3 Days

## 2021-10-29 NOTE — ED PROVIDER NOTE - PHYSICAL EXAMINATION
Constitutional: NAD, well appearing  HEENT: no rhinorrhe  CVS:  RRR, no m/r/g  Resp:  CTAB  GI: soft, ntnd  MSK:  no restriction to ROM  Neuro: a&ox3. Moving all extremities equally  Skin: no rash  psych: clear thought content  Heme/lymph:  No LAD

## 2021-10-29 NOTE — ED ADULT TRIAGE NOTE - CHIEF COMPLAINT QUOTE
Pt. presents to ED c/o hypertension. Pt. states she went to her PMD earlier today and it was elevated. Pt. rechecked It tonight and it was higher. Pt. denies HA/blurry vision. States she takes medications for her BP

## 2021-10-29 NOTE — ED PROVIDER NOTE - NSICDXPASTMEDICALHX_GEN_ALL_CORE_FT
PAST MEDICAL HISTORY:  Anxiety     HTN (hypertension)     Hypothyroidism     IBS (irritable bowel syndrome)

## 2021-10-29 NOTE — ED PROVIDER NOTE - PATIENT PORTAL LINK FT
You can access the FollowMyHealth Patient Portal offered by [facility] by registering at the following website: [email or web address]. By joining [employer]’s FollowMyHealth portal, you will also be able to view your health information using other applications (apps) compatible with our system.

## 2021-11-02 ENCOUNTER — APPOINTMENT (OUTPATIENT)
Age: 66
End: 2021-11-02
Payer: MEDICARE

## 2021-11-02 VITALS
DIASTOLIC BLOOD PRESSURE: 106 MMHG | HEART RATE: 84 BPM | WEIGHT: 108 LBS | SYSTOLIC BLOOD PRESSURE: 174 MMHG | TEMPERATURE: 97.8 F | HEIGHT: 64 IN | BODY MASS INDEX: 18.44 KG/M2 | OXYGEN SATURATION: 99 %

## 2021-11-02 VITALS — SYSTOLIC BLOOD PRESSURE: 162 MMHG | DIASTOLIC BLOOD PRESSURE: 92 MMHG

## 2021-11-02 DIAGNOSIS — Z00.00 ENCOUNTER FOR GENERAL ADULT MEDICAL EXAMINATION W/OUT ABNORMAL FINDINGS: ICD-10-CM

## 2021-11-02 DIAGNOSIS — Z23 ENCOUNTER FOR IMMUNIZATION: ICD-10-CM

## 2021-11-02 PROCEDURE — 99214 OFFICE O/P EST MOD 30 MIN: CPT

## 2021-11-02 RX ORDER — ESCITALOPRAM OXALATE 10 MG/1
10 TABLET ORAL
Qty: 30 | Refills: 5 | Status: COMPLETED | COMMUNITY
Start: 2021-07-06 | End: 2021-11-02

## 2021-11-02 NOTE — ASSESSMENT
[FreeTextEntry1] : 1. elevated BP\par Continue Losartan HCTZ 100- 25 mg po daily \par Start Amlodipine 2.5 mg po daily \par Continue BP log at home , call if   140 /90 \par limit Na intake \par 2. anxiety\par  discussed appt with Wilson Galvez for behavioral  therapy, pt want to check insurance coverage \par F/up 3 weeks for BP check

## 2021-11-02 NOTE — PHYSICAL EXAM
[No Acute Distress] : no acute distress [Well Nourished] : well nourished [Supple] : supple [No Respiratory Distress] : no respiratory distress  [No Accessory Muscle Use] : no accessory muscle use [Clear to Auscultation] : lungs were clear to auscultation bilaterally [Normal Rate] : normal rate  [Regular Rhythm] : with a regular rhythm [Normal S1, S2] : normal S1 and S2 [Pedal Pulses Present] : the pedal pulses are present [No Extremity Clubbing/Cyanosis] : no extremity clubbing/cyanosis [Coordination Grossly Intact] : coordination grossly intact [No Focal Deficits] : no focal deficits [Normal Gait] : normal gait [Normal Affect] : the affect was normal [Alert and Oriented x3] : oriented to person, place, and time [Normal Insight/Judgement] : insight and judgment were intact

## 2021-11-02 NOTE — HISTORY OF PRESENT ILLNESS
[FreeTextEntry1] : F/up  [de-identified] : pt is a 66 yr. old female with  a h. of GERD, HTN, hypothyroidism, insomnia, anxiety. She presented to the ER with elevated BP reading at her doctor office that day. She denied headache, blurred vision ,lightheadedness. ECG revealed sinus tach 107, TSH  - 4.39, CBC was NL.  per pt she has been under increased anxiety lately with son's drug addiction. She was given dose of Ativan in ER and felt better, was discharged. \par She has been checking her BP at home , averages 16- 180/ 90's since discharge.  Denies headache/ vision changes. She takes Xanax sparingly for anxiety. Will be making an appt with  for behavioral therapy. for anxiety issues. She was on multiple SSRI's, can not tolerate the Side effects.

## 2021-11-02 NOTE — REVIEW OF SYSTEMS
[Fever] : no fever [Chills] : no chills [Fatigue] : no fatigue [Negative] : Psychiatric [FreeTextEntry5] : see HPI

## 2021-11-19 ENCOUNTER — NON-APPOINTMENT (OUTPATIENT)
Age: 66
End: 2021-11-19

## 2021-11-19 ENCOUNTER — APPOINTMENT (OUTPATIENT)
Dept: INTERNAL MEDICINE | Facility: CLINIC | Age: 66
End: 2021-11-19
Payer: MEDICARE

## 2021-11-19 VITALS
TEMPERATURE: 97.5 F | SYSTOLIC BLOOD PRESSURE: 148 MMHG | OXYGEN SATURATION: 97 % | RESPIRATION RATE: 16 BRPM | BODY MASS INDEX: 17.75 KG/M2 | HEART RATE: 77 BPM | HEIGHT: 64 IN | WEIGHT: 104 LBS | DIASTOLIC BLOOD PRESSURE: 80 MMHG

## 2021-11-19 VITALS — SYSTOLIC BLOOD PRESSURE: 140 MMHG | DIASTOLIC BLOOD PRESSURE: 70 MMHG

## 2021-11-19 DIAGNOSIS — R00.2 PALPITATIONS: ICD-10-CM

## 2021-11-19 DIAGNOSIS — I49.9 CARDIAC ARRHYTHMIA, UNSPECIFIED: ICD-10-CM

## 2021-11-19 PROCEDURE — 99214 OFFICE O/P EST MOD 30 MIN: CPT | Mod: 25

## 2021-11-19 PROCEDURE — 93000 ELECTROCARDIOGRAM COMPLETE: CPT

## 2021-11-19 NOTE — HISTORY OF PRESENT ILLNESS
[FreeTextEntry8] : Patient is a 66 -year -old female with PMH of GERD, HTN, hypothyroidism, insomnia, anxiety presents to office today for BP check. Was recently seen with elevated BP on 11/2/21 in which she was started on amlodipine 2.5mg in addition to losartan 100mg - HCTZ 25mg. She reports she has been checking BP at home and readings have been "high". Unsure of range as patient forgot BP log at home. She reports she is asymptomatic with her "high" readings. Has not seen Cardiology in over a year. She regularly follows with endocrinologist and has upcoming appt.

## 2021-11-19 NOTE — PLAN
[FreeTextEntry1] : 1. Patient seen in office today with BP WNL -140/70. Advised to continue with amlodipine q HS and losartan - hctz 100mg/ 25mg qd. Patient to bring BP monitor to next visit for BP comparison. If /90 may take additional 2.5mg of Amlodipine. \par \par 2. Patient noted to have regularly irregular HR on exam, EKG done NSR with palpitations.  Will do CBC CMP only at patients request and will follow up with endocrinology for assessment of thyroid. To also follow up with cardiology for yearly cardiac testing. Will follow results. \par \par 3. To return to office for follow up with Dr. Telles 1/18/21 or sooner if needed\par \par All questions answered. Patient agrees with plan above.

## 2021-11-19 NOTE — PHYSICAL EXAM
[No Acute Distress] : no acute distress [No Lymphadenopathy] : no lymphadenopathy [Supple] : supple [No Respiratory Distress] : no respiratory distress  [Clear to Auscultation] : lungs were clear to auscultation bilaterally [Normal Rate] : normal rate  [Normal S1, S2] : normal S1 and S2 [No Edema] : there was no peripheral edema [Soft] : abdomen soft [Non Tender] : non-tender [Non-distended] : non-distended [Normal Bowel Sounds] : normal bowel sounds [de-identified] : Irregularly regular rhythm

## 2021-11-22 ENCOUNTER — NON-APPOINTMENT (OUTPATIENT)
Age: 66
End: 2021-11-22

## 2021-11-22 LAB
ALBUMIN SERPL ELPH-MCNC: 4.5 G/DL
ALP BLD-CCNC: 41 U/L
ALT SERPL-CCNC: 18 U/L
ANION GAP SERPL CALC-SCNC: 14 MMOL/L
AST SERPL-CCNC: 21 U/L
BASOPHILS # BLD AUTO: 0.03 K/UL
BASOPHILS NFR BLD AUTO: 0.5 %
BILIRUB SERPL-MCNC: 0.2 MG/DL
BUN SERPL-MCNC: 11 MG/DL
CALCIUM SERPL-MCNC: 9.7 MG/DL
CHLORIDE SERPL-SCNC: 93 MMOL/L
CO2 SERPL-SCNC: 27 MMOL/L
CREAT SERPL-MCNC: 0.63 MG/DL
EOSINOPHIL # BLD AUTO: 0.07 K/UL
EOSINOPHIL NFR BLD AUTO: 1.2 %
GLUCOSE SERPL-MCNC: 93 MG/DL
HCT VFR BLD CALC: 39.2 %
HGB BLD-MCNC: 13.1 G/DL
IMM GRANULOCYTES NFR BLD AUTO: 0.2 %
LYMPHOCYTES # BLD AUTO: 1.3 K/UL
LYMPHOCYTES NFR BLD AUTO: 22.3 %
MAN DIFF?: NORMAL
MCHC RBC-ENTMCNC: 29.6 PG
MCHC RBC-ENTMCNC: 33.4 GM/DL
MCV RBC AUTO: 88.5 FL
MONOCYTES # BLD AUTO: 0.51 K/UL
MONOCYTES NFR BLD AUTO: 8.7 %
NEUTROPHILS # BLD AUTO: 3.91 K/UL
NEUTROPHILS NFR BLD AUTO: 67.1 %
PLATELET # BLD AUTO: 266 K/UL
POTASSIUM SERPL-SCNC: 3.8 MMOL/L
PROT SERPL-MCNC: 6.4 G/DL
RBC # BLD: 4.43 M/UL
RBC # FLD: 13 %
SODIUM SERPL-SCNC: 134 MMOL/L
WBC # FLD AUTO: 5.83 K/UL

## 2021-11-29 ENCOUNTER — RX RENEWAL (OUTPATIENT)
Age: 66
End: 2021-11-29

## 2021-12-08 ENCOUNTER — APPOINTMENT (OUTPATIENT)
Dept: INTERNAL MEDICINE | Facility: CLINIC | Age: 66
End: 2021-12-08
Payer: MEDICARE

## 2021-12-08 PROCEDURE — 0003A: CPT

## 2022-01-11 NOTE — ED ADULT NURSE NOTE - HARM RISK FACTORS
[facility] has sent a fax regarding [facility]  Requesting a medication refill of sertraline (ZOLOFT) 50 mg tablet  Refill to be sent to the [facility] on [address] 
no

## 2022-01-18 ENCOUNTER — APPOINTMENT (OUTPATIENT)
Dept: INTERNAL MEDICINE | Facility: CLINIC | Age: 67
End: 2022-01-18
Payer: MEDICARE

## 2022-01-18 VITALS
RESPIRATION RATE: 16 BRPM | TEMPERATURE: 98.7 F | SYSTOLIC BLOOD PRESSURE: 124 MMHG | BODY MASS INDEX: 17.42 KG/M2 | OXYGEN SATURATION: 99 % | WEIGHT: 102 LBS | HEART RATE: 77 BPM | HEIGHT: 64 IN | DIASTOLIC BLOOD PRESSURE: 73 MMHG

## 2022-01-18 DIAGNOSIS — F41.9 ANXIETY DISORDER, UNSPECIFIED: ICD-10-CM

## 2022-01-18 DIAGNOSIS — F41.8 OTHER SPECIFIED ANXIETY DISORDERS: ICD-10-CM

## 2022-01-18 DIAGNOSIS — G43.701 CHRONIC MIGRAINE W/OUT AURA, NOT INTRACTABLE, WITH STATUS MIGRAINOSUS: ICD-10-CM

## 2022-01-18 PROCEDURE — G0444 DEPRESSION SCREEN ANNUAL: CPT | Mod: 59

## 2022-01-18 PROCEDURE — 99214 OFFICE O/P EST MOD 30 MIN: CPT | Mod: 25

## 2022-01-18 NOTE — PLAN
[FreeTextEntry1] : Mrs. Arango presents for followup evaluation. Her blood pressure is now well controlled on amlodipine 5 mg daily and losartan hydrochlorothiazide and 100/25 mg daily. She had an echocardiogram which showed normal left ventricular ejection fraction. She did have moderate mitral regurgitation and moderate tricuspid regurgitation. Patient continues to follow with endocrinology for her hypothyroidism and osteoporosis. She is given a prescription for CBC, CMP plus hemoglobin A1c, iron level, lipid profile, TSH level with free T4 and urinalysis. Patient has a history anxiety and migraine headaches. Prescriptions for alprazolam and Fioricet have been renewed. Followup in 6 months.

## 2022-01-18 NOTE — HISTORY OF PRESENT ILLNESS
[FreeTextEntry1] : Followup [de-identified] : Mrs. Elizondo presents for a followup evaluation. Her blood pressure is not well-controlled. She had a cardiology followup with Dr. Oliva and had her amlodipine increased to 5 mg daily. She continues on losartan hydrochlorothiazide 100/25 mg daily. Patient had an echocardiogram on 1/4/22 which showed normal left ventricular ejection fraction. There was moderate mitral regurgitation and moderate tricuspid regurgitation. Patient denies any chest pain, shortness of breath or palpitations. She has no nocturnal symptoms of cough or dyspnea. She also continues to followup with her endocrinologist regarding treatment of her osteoporosis.

## 2022-04-29 LAB
ALBUMIN SERPL ELPH-MCNC: 4.5 G/DL
ALP BLD-CCNC: 39 U/L
ALT SERPL-CCNC: 16 U/L
ANION GAP SERPL CALC-SCNC: 12 MMOL/L
APPEARANCE: CLEAR
AST SERPL-CCNC: 21 U/L
BACTERIA: NEGATIVE
BASOPHILS # BLD AUTO: 0.05 K/UL
BASOPHILS NFR BLD AUTO: 0.8 %
BILIRUB SERPL-MCNC: 0.6 MG/DL
BILIRUBIN URINE: NEGATIVE
BLOOD URINE: NORMAL
BUN SERPL-MCNC: 10 MG/DL
CALCIUM SERPL-MCNC: 9.9 MG/DL
CHLORIDE SERPL-SCNC: 94 MMOL/L
CHOLEST SERPL-MCNC: 195 MG/DL
CO2 SERPL-SCNC: 28 MMOL/L
COLOR: NORMAL
CREAT SERPL-MCNC: 0.79 MG/DL
EGFR: 82 ML/MIN/1.73M2
EOSINOPHIL # BLD AUTO: 0.09 K/UL
EOSINOPHIL NFR BLD AUTO: 1.4 %
ESTIMATED AVERAGE GLUCOSE: 114 MG/DL
GLUCOSE QUALITATIVE U: NEGATIVE
GLUCOSE SERPL-MCNC: 88 MG/DL
HBA1C MFR BLD HPLC: 5.6 %
HCT VFR BLD CALC: 41.2 %
HDLC SERPL-MCNC: 70 MG/DL
HGB BLD-MCNC: 13.5 G/DL
HYALINE CASTS: 1 /LPF
IMM GRANULOCYTES NFR BLD AUTO: 0.2 %
IRON SERPL-MCNC: 192 UG/DL
KETONES URINE: NEGATIVE
LDLC SERPL CALC-MCNC: 104 MG/DL
LEUKOCYTE ESTERASE URINE: NEGATIVE
LYMPHOCYTES # BLD AUTO: 1.56 K/UL
LYMPHOCYTES NFR BLD AUTO: 23.9 %
MAN DIFF?: NORMAL
MCHC RBC-ENTMCNC: 29.3 PG
MCHC RBC-ENTMCNC: 32.8 GM/DL
MCV RBC AUTO: 89.4 FL
MICROSCOPIC-UA: NORMAL
MONOCYTES # BLD AUTO: 0.63 K/UL
MONOCYTES NFR BLD AUTO: 9.6 %
NEUTROPHILS # BLD AUTO: 4.19 K/UL
NEUTROPHILS NFR BLD AUTO: 64.1 %
NITRITE URINE: NEGATIVE
NONHDLC SERPL-MCNC: 124 MG/DL
PH URINE: 7.5
PLATELET # BLD AUTO: 273 K/UL
POTASSIUM SERPL-SCNC: 3.9 MMOL/L
PROT SERPL-MCNC: 6.5 G/DL
PROTEIN URINE: NEGATIVE
RBC # BLD: 4.61 M/UL
RBC # FLD: 13.3 %
RED BLOOD CELLS URINE: 3 /HPF
SODIUM SERPL-SCNC: 134 MMOL/L
SPECIFIC GRAVITY URINE: 1.01
SQUAMOUS EPITHELIAL CELLS: 1 /HPF
TRIGL SERPL-MCNC: 101 MG/DL
TSH SERPL-ACNC: 2.06 UIU/ML
UROBILINOGEN URINE: NORMAL
WBC # FLD AUTO: 6.53 K/UL
WHITE BLOOD CELLS URINE: 1 /HPF

## 2022-05-12 ENCOUNTER — APPOINTMENT (OUTPATIENT)
Dept: OBGYN | Facility: CLINIC | Age: 67
End: 2022-05-12
Payer: MEDICARE

## 2022-05-12 VITALS
BODY MASS INDEX: 18.61 KG/M2 | HEART RATE: 75 BPM | DIASTOLIC BLOOD PRESSURE: 70 MMHG | SYSTOLIC BLOOD PRESSURE: 130 MMHG | RESPIRATION RATE: 14 BRPM | WEIGHT: 109 LBS | HEIGHT: 64 IN

## 2022-05-12 PROCEDURE — 82270 OCCULT BLOOD FECES: CPT

## 2022-05-12 PROCEDURE — G0101: CPT

## 2022-05-12 NOTE — PLAN
[FreeTextEntry1] : 65 YO FEMALE PRESENTS FOR ANNUAL GYN EXAMINATION. SELF BREAST EXAMINATION TAUGHT. MAMMOGRAM ORDERED. EXERCISE, CALCIUM AND VITAMIN D3 SUPPLEMENTATION DISCUSSED. BONE DENSITY STUDY AND INDICATIONS REVIEWED. COLONOSCOPY HISTORY REVIEWED AND DISCUSSED FOLLOWUP.

## 2022-07-08 ENCOUNTER — RX RENEWAL (OUTPATIENT)
Age: 67
End: 2022-07-08

## 2022-07-18 LAB
ALBUMIN SERPL ELPH-MCNC: 4.5 G/DL
ALP BLD-CCNC: 45 U/L
ALT SERPL-CCNC: 15 U/L
ANION GAP SERPL CALC-SCNC: 11 MMOL/L
AST SERPL-CCNC: 15 U/L
BASOPHILS # BLD AUTO: 0.03 K/UL
BASOPHILS NFR BLD AUTO: 0.6 %
BILIRUB SERPL-MCNC: 0.2 MG/DL
BUN SERPL-MCNC: 10 MG/DL
CALCIUM SERPL-MCNC: 9.9 MG/DL
CHLORIDE SERPL-SCNC: 95 MMOL/L
CHOLEST SERPL-MCNC: 195 MG/DL
CO2 SERPL-SCNC: 30 MMOL/L
CREAT SERPL-MCNC: 0.69 MG/DL
EGFR: 94 ML/MIN/1.73M2
EOSINOPHIL # BLD AUTO: 0.11 K/UL
EOSINOPHIL NFR BLD AUTO: 2.2 %
FERRITIN SERPL-MCNC: 19 NG/ML
FOLATE SERPL-MCNC: 13.5 NG/ML
GLUCOSE SERPL-MCNC: 82 MG/DL
HCT VFR BLD CALC: 39.3 %
HDLC SERPL-MCNC: 68 MG/DL
HGB BLD-MCNC: 13 G/DL
IMM GRANULOCYTES NFR BLD AUTO: 0.4 %
IRON SATN MFR SERPL: 26 %
IRON SERPL-MCNC: 92 UG/DL
LDLC SERPL CALC-MCNC: 103 MG/DL
LYMPHOCYTES # BLD AUTO: 1.29 K/UL
LYMPHOCYTES NFR BLD AUTO: 26.2 %
MAN DIFF?: NORMAL
MCHC RBC-ENTMCNC: 30 PG
MCHC RBC-ENTMCNC: 33.1 GM/DL
MCV RBC AUTO: 90.6 FL
MONOCYTES # BLD AUTO: 0.52 K/UL
MONOCYTES NFR BLD AUTO: 10.5 %
NEUTROPHILS # BLD AUTO: 2.96 K/UL
NEUTROPHILS NFR BLD AUTO: 60.1 %
NONHDLC SERPL-MCNC: 128 MG/DL
PLATELET # BLD AUTO: 270 K/UL
POTASSIUM SERPL-SCNC: 3.6 MMOL/L
PROT SERPL-MCNC: 6.4 G/DL
RBC # BLD: 4.34 M/UL
RBC # FLD: 13.6 %
SODIUM SERPL-SCNC: 136 MMOL/L
TIBC SERPL-MCNC: 346 UG/DL
TRANSFERRIN SERPL-MCNC: 281 MG/DL
TRIGL SERPL-MCNC: 125 MG/DL
UIBC SERPL-MCNC: 254 UG/DL
VIT B12 SERPL-MCNC: 945 PG/ML
WBC # FLD AUTO: 4.93 K/UL

## 2022-07-19 ENCOUNTER — APPOINTMENT (OUTPATIENT)
Dept: INTERNAL MEDICINE | Facility: CLINIC | Age: 67
End: 2022-07-19

## 2022-07-19 VITALS
SYSTOLIC BLOOD PRESSURE: 110 MMHG | RESPIRATION RATE: 16 BRPM | WEIGHT: 110 LBS | OXYGEN SATURATION: 98 % | DIASTOLIC BLOOD PRESSURE: 80 MMHG | HEART RATE: 64 BPM | BODY MASS INDEX: 18.78 KG/M2 | HEIGHT: 64 IN | TEMPERATURE: 98.1 F

## 2022-07-19 DIAGNOSIS — K21.9 GASTRO-ESOPHAGEAL REFLUX DISEASE W/OUT ESOPHAGITIS: ICD-10-CM

## 2022-07-19 DIAGNOSIS — I49.3 VENTRICULAR PREMATURE DEPOLARIZATION: ICD-10-CM

## 2022-07-19 DIAGNOSIS — R79.0 ABNORMAL LVL OF BLOOD MINERAL: ICD-10-CM

## 2022-07-19 DIAGNOSIS — E78.5 HYPERLIPIDEMIA, UNSPECIFIED: ICD-10-CM

## 2022-07-19 DIAGNOSIS — I34.0 NONRHEUMATIC MITRAL (VALVE) INSUFFICIENCY: ICD-10-CM

## 2022-07-19 PROCEDURE — 99214 OFFICE O/P EST MOD 30 MIN: CPT

## 2022-07-19 RX ORDER — SODIUM SULFATE, POTASSIUM SULFATE, MAGNESIUM SULFATE 17.5; 3.13; 1.6 G/ML; G/ML; G/ML
17.5-3.13-1.6 SOLUTION, CONCENTRATE ORAL
Qty: 354 | Refills: 0 | Status: ACTIVE | COMMUNITY
Start: 2021-10-28

## 2022-07-19 RX ORDER — METOPROLOL SUCCINATE 25 MG/1
25 TABLET, EXTENDED RELEASE ORAL
Qty: 90 | Refills: 0 | Status: ACTIVE | COMMUNITY
Start: 2022-06-21

## 2022-07-19 NOTE — PHYSICAL EXAM
[No Acute Distress] : no acute distress [Well Nourished] : well nourished [Well Developed] : well developed [Well-Appearing] : well-appearing [Normal Sclera/Conjunctiva] : normal sclera/conjunctiva [PERRL] : pupils equal round and reactive to light [EOMI] : extraocular movements intact [Normal Outer Ear/Nose] : the outer ears and nose were normal in appearance [Normal Oropharynx] : the oropharynx was normal [No JVD] : no jugular venous distention [No Lymphadenopathy] : no lymphadenopathy [Supple] : supple [Thyroid Normal, No Nodules] : the thyroid was normal and there were no nodules present [No Respiratory Distress] : no respiratory distress  [No Accessory Muscle Use] : no accessory muscle use [Clear to Auscultation] : lungs were clear to auscultation bilaterally [Normal Rate] : normal rate  [Regular Rhythm] : with a regular rhythm [Normal S1, S2] : normal S1 and S2 [No Murmur] : no murmur heard [No Carotid Bruits] : no carotid bruits [No Abdominal Bruit] : a ~M bruit was not heard ~T in the abdomen [No Varicosities] : no varicosities [Pedal Pulses Present] : the pedal pulses are present [No Edema] : there was no peripheral edema [No Palpable Aorta] : no palpable aorta [No Extremity Clubbing/Cyanosis] : no extremity clubbing/cyanosis [Soft] : abdomen soft [Non Tender] : non-tender [Non-distended] : non-distended [No Masses] : no abdominal mass palpated [No HSM] : no HSM [Normal Bowel Sounds] : normal bowel sounds [Normal Posterior Cervical Nodes] : no posterior cervical lymphadenopathy [Normal Anterior Cervical Nodes] : no anterior cervical lymphadenopathy [No CVA Tenderness] : no CVA  tenderness [No Spinal Tenderness] : no spinal tenderness [No Joint Swelling] : no joint swelling [Grossly Normal Strength/Tone] : grossly normal strength/tone [No Rash] : no rash [Coordination Grossly Intact] : coordination grossly intact [No Focal Deficits] : no focal deficits [Normal Gait] : normal gait [Deep Tendon Reflexes (DTR)] : deep tendon reflexes were 2+ and symmetric [Normal Affect] : the affect was normal [Normal Insight/Judgement] : insight and judgment were intact [de-identified] : Extrasystole

## 2022-07-19 NOTE — PLAN
[FreeTextEntry1] : Mrs. Elizondo presents for followup evaluation.\par \par #1. Patient has a history of palpitations. ECG showed normal sinus rhythm with multifocal PVCs. She is scheduled for followup EP evaluation with .\par \par #2. Comprehensive blood profile was reviewed with the patient.\par \par #3. Mrs. Elizondo will continue on current medication regimen which has been reviewed and revised.\par \par #4. She will followup in 6 months with a repeat CBC and comprehensive metabolic panel

## 2022-07-19 NOTE — HISTORY OF PRESENT ILLNESS
[FreeTextEntry1] : followup [de-identified] : Mrs. Elizondo presents for a followup evaluation. She is feeling well. She has been having some episodes of palpitations. She was seen by Dr. COLLIER and then referred to Dr. Yoder for EP evaluation. ECG showed sinus rhythm with multifocal PVCs. Patient does occasionally get palpitations. She denies any associated chest pain or shortness of breath.

## 2022-10-27 ENCOUNTER — NON-APPOINTMENT (OUTPATIENT)
Age: 67
End: 2022-10-27

## 2022-10-27 ENCOUNTER — APPOINTMENT (OUTPATIENT)
Dept: INTERNAL MEDICINE | Facility: CLINIC | Age: 67
End: 2022-10-27

## 2022-10-28 DIAGNOSIS — J04.0 ACUTE LARYNGITIS: ICD-10-CM

## 2022-10-28 DIAGNOSIS — U07.1 COVID-19: ICD-10-CM

## 2022-10-28 RX ORDER — LEVOFLOXACIN 500 MG/1
500 TABLET, FILM COATED ORAL DAILY
Qty: 7 | Refills: 1 | Status: ACTIVE | COMMUNITY
Start: 2022-10-28 | End: 1900-01-01

## 2022-11-15 ENCOUNTER — MED ADMIN CHARGE (OUTPATIENT)
Age: 67
End: 2022-11-15

## 2022-11-15 ENCOUNTER — APPOINTMENT (OUTPATIENT)
Dept: INTERNAL MEDICINE | Facility: CLINIC | Age: 67
End: 2022-11-15

## 2022-11-15 PROCEDURE — G0008: CPT

## 2022-11-15 PROCEDURE — 90662 IIV NO PRSV INCREASED AG IM: CPT

## 2022-11-29 ENCOUNTER — EMERGENCY (EMERGENCY)
Facility: HOSPITAL | Age: 67
LOS: 0 days | Discharge: HOME | End: 2022-11-30
Attending: EMERGENCY MEDICINE
Payer: MEDICARE

## 2022-11-29 ENCOUNTER — NON-APPOINTMENT (OUTPATIENT)
Age: 67
End: 2022-11-29

## 2022-11-29 VITALS
HEART RATE: 104 BPM | RESPIRATION RATE: 20 BRPM | WEIGHT: 110.01 LBS | HEIGHT: 61 IN | OXYGEN SATURATION: 97 % | SYSTOLIC BLOOD PRESSURE: 160 MMHG | TEMPERATURE: 98 F | DIASTOLIC BLOOD PRESSURE: 86 MMHG

## 2022-11-29 DIAGNOSIS — R19.7 DIARRHEA, UNSPECIFIED: ICD-10-CM

## 2022-11-29 DIAGNOSIS — E03.9 HYPOTHYROIDISM, UNSPECIFIED: ICD-10-CM

## 2022-11-29 DIAGNOSIS — U07.1 COVID-19: ICD-10-CM

## 2022-11-29 DIAGNOSIS — K58.9 IRRITABLE BOWEL SYNDROME WITHOUT DIARRHEA: ICD-10-CM

## 2022-11-29 DIAGNOSIS — R11.2 NAUSEA WITH VOMITING, UNSPECIFIED: ICD-10-CM

## 2022-11-29 DIAGNOSIS — I10 ESSENTIAL (PRIMARY) HYPERTENSION: ICD-10-CM

## 2022-11-29 DIAGNOSIS — R00.0 TACHYCARDIA, UNSPECIFIED: ICD-10-CM

## 2022-11-29 DIAGNOSIS — Z88.5 ALLERGY STATUS TO NARCOTIC AGENT: ICD-10-CM

## 2022-11-29 DIAGNOSIS — R53.1 WEAKNESS: ICD-10-CM

## 2022-11-29 DIAGNOSIS — E87.6 HYPOKALEMIA: ICD-10-CM

## 2022-11-29 DIAGNOSIS — F41.9 ANXIETY DISORDER, UNSPECIFIED: ICD-10-CM

## 2022-11-29 LAB
ALBUMIN SERPL ELPH-MCNC: 3.3 G/DL — SIGNIFICANT CHANGE UP (ref 3.3–5)
ALP SERPL-CCNC: 43 U/L — SIGNIFICANT CHANGE UP (ref 40–120)
ALT FLD-CCNC: 43 U/L — SIGNIFICANT CHANGE UP (ref 12–78)
ANION GAP SERPL CALC-SCNC: 11 MMOL/L — SIGNIFICANT CHANGE UP (ref 5–17)
AST SERPL-CCNC: 34 U/L — SIGNIFICANT CHANGE UP (ref 15–37)
BASOPHILS # BLD AUTO: 0 K/UL — SIGNIFICANT CHANGE UP (ref 0–0.2)
BASOPHILS NFR BLD AUTO: 0 % — SIGNIFICANT CHANGE UP (ref 0–2)
BILIRUB SERPL-MCNC: 0.4 MG/DL — SIGNIFICANT CHANGE UP (ref 0.2–1.2)
BUN SERPL-MCNC: 11 MG/DL — SIGNIFICANT CHANGE UP (ref 7–23)
CALCIUM SERPL-MCNC: 8 MG/DL — LOW (ref 8.5–10.1)
CHLORIDE SERPL-SCNC: 85 MMOL/L — LOW (ref 96–108)
CO2 SERPL-SCNC: 27 MMOL/L — SIGNIFICANT CHANGE UP (ref 22–31)
CREAT SERPL-MCNC: 0.54 MG/DL — SIGNIFICANT CHANGE UP (ref 0.5–1.3)
EGFR: 100 ML/MIN/1.73M2 — SIGNIFICANT CHANGE UP
EOSINOPHIL # BLD AUTO: 0 K/UL — SIGNIFICANT CHANGE UP (ref 0–0.5)
EOSINOPHIL NFR BLD AUTO: 0 % — SIGNIFICANT CHANGE UP (ref 0–6)
GLUCOSE SERPL-MCNC: 118 MG/DL — HIGH (ref 70–99)
HCT VFR BLD CALC: 40.1 % — SIGNIFICANT CHANGE UP (ref 34.5–45)
HGB BLD-MCNC: 14.3 G/DL — SIGNIFICANT CHANGE UP (ref 11.5–15.5)
IMM GRANULOCYTES NFR BLD AUTO: 0.2 % — SIGNIFICANT CHANGE UP (ref 0–0.9)
LYMPHOCYTES # BLD AUTO: 0.37 K/UL — LOW (ref 1–3.3)
LYMPHOCYTES # BLD AUTO: 8.4 % — LOW (ref 13–44)
MCHC RBC-ENTMCNC: 29.9 PG — SIGNIFICANT CHANGE UP (ref 27–34)
MCHC RBC-ENTMCNC: 35.7 GM/DL — SIGNIFICANT CHANGE UP (ref 32–36)
MCV RBC AUTO: 83.9 FL — SIGNIFICANT CHANGE UP (ref 80–100)
MONOCYTES # BLD AUTO: 0.45 K/UL — SIGNIFICANT CHANGE UP (ref 0–0.9)
MONOCYTES NFR BLD AUTO: 10.2 % — SIGNIFICANT CHANGE UP (ref 2–14)
NEUTROPHILS # BLD AUTO: 3.57 K/UL — SIGNIFICANT CHANGE UP (ref 1.8–7.4)
NEUTROPHILS NFR BLD AUTO: 81.2 % — HIGH (ref 43–77)
PLATELET # BLD AUTO: 278 K/UL — SIGNIFICANT CHANGE UP (ref 150–400)
POTASSIUM SERPL-MCNC: 2.9 MMOL/L — CRITICAL LOW (ref 3.5–5.3)
POTASSIUM SERPL-SCNC: 2.9 MMOL/L — CRITICAL LOW (ref 3.5–5.3)
PROT SERPL-MCNC: 6.8 GM/DL — SIGNIFICANT CHANGE UP (ref 6–8.3)
RBC # BLD: 4.78 M/UL — SIGNIFICANT CHANGE UP (ref 3.8–5.2)
RBC # FLD: 12.6 % — SIGNIFICANT CHANGE UP (ref 10.3–14.5)
SODIUM SERPL-SCNC: 123 MMOL/L — LOW (ref 135–145)
TROPONIN I, HIGH SENSITIVITY RESULT: 18.63 NG/L — SIGNIFICANT CHANGE UP
WBC # BLD: 4.4 K/UL — SIGNIFICANT CHANGE UP (ref 3.8–10.5)
WBC # FLD AUTO: 4.4 K/UL — SIGNIFICANT CHANGE UP (ref 3.8–10.5)

## 2022-11-29 PROCEDURE — 36415 COLL VENOUS BLD VENIPUNCTURE: CPT

## 2022-11-29 PROCEDURE — 93005 ELECTROCARDIOGRAM TRACING: CPT

## 2022-11-29 PROCEDURE — 80053 COMPREHEN METABOLIC PANEL: CPT

## 2022-11-29 PROCEDURE — 85025 COMPLETE CBC W/AUTO DIFF WBC: CPT

## 2022-11-29 PROCEDURE — 96374 THER/PROPH/DIAG INJ IV PUSH: CPT

## 2022-11-29 PROCEDURE — 71045 X-RAY EXAM CHEST 1 VIEW: CPT | Mod: 26

## 2022-11-29 PROCEDURE — 99285 EMERGENCY DEPT VISIT HI MDM: CPT | Mod: 25

## 2022-11-29 PROCEDURE — 96375 TX/PRO/DX INJ NEW DRUG ADDON: CPT

## 2022-11-29 PROCEDURE — 96376 TX/PRO/DX INJ SAME DRUG ADON: CPT

## 2022-11-29 PROCEDURE — 71045 X-RAY EXAM CHEST 1 VIEW: CPT

## 2022-11-29 PROCEDURE — 84484 ASSAY OF TROPONIN QUANT: CPT

## 2022-11-29 PROCEDURE — 93010 ELECTROCARDIOGRAM REPORT: CPT

## 2022-11-29 PROCEDURE — 99285 EMERGENCY DEPT VISIT HI MDM: CPT | Mod: CS

## 2022-11-29 PROCEDURE — 80048 BASIC METABOLIC PNL TOTAL CA: CPT

## 2022-11-29 PROCEDURE — 96361 HYDRATE IV INFUSION ADD-ON: CPT

## 2022-11-29 RX ORDER — SODIUM CHLORIDE 9 MG/ML
1000 INJECTION INTRAMUSCULAR; INTRAVENOUS; SUBCUTANEOUS ONCE
Refills: 0 | Status: COMPLETED | OUTPATIENT
Start: 2022-11-29 | End: 2022-11-29

## 2022-11-29 RX ORDER — POTASSIUM CHLORIDE 20 MEQ
10 PACKET (EA) ORAL
Refills: 0 | Status: DISCONTINUED | OUTPATIENT
Start: 2022-11-29 | End: 2022-11-30

## 2022-11-29 RX ORDER — ONDANSETRON 8 MG/1
4 TABLET, FILM COATED ORAL ONCE
Refills: 0 | Status: COMPLETED | OUTPATIENT
Start: 2022-11-29 | End: 2022-11-29

## 2022-11-29 RX ORDER — POTASSIUM CHLORIDE 20 MEQ
40 PACKET (EA) ORAL ONCE
Refills: 0 | Status: COMPLETED | OUTPATIENT
Start: 2022-11-29 | End: 2022-11-29

## 2022-11-29 RX ADMIN — ONDANSETRON 4 MILLIGRAM(S): 8 TABLET, FILM COATED ORAL at 21:20

## 2022-11-29 RX ADMIN — Medication 100 MILLIEQUIVALENT(S): at 23:14

## 2022-11-29 RX ADMIN — SODIUM CHLORIDE 1000 MILLILITER(S): 9 INJECTION INTRAMUSCULAR; INTRAVENOUS; SUBCUTANEOUS at 21:21

## 2022-11-29 RX ADMIN — Medication 40 MILLIEQUIVALENT(S): at 23:13

## 2022-11-29 RX ADMIN — SODIUM CHLORIDE 1000 MILLILITER(S): 9 INJECTION INTRAMUSCULAR; INTRAVENOUS; SUBCUTANEOUS at 22:30

## 2022-11-29 NOTE — ED ADULT TRIAGE NOTE - CHIEF COMPLAINT QUOTE
pt BIBA complaining of weakness since patient tested positive for covid yesterday.  pt complains of nausea/vomiting/diarrhea.

## 2022-11-29 NOTE — ED PROVIDER NOTE - PATIENT PORTAL LINK FT
You can access the FollowMyHealth Patient Portal offered by [facility] by registering at the following website: [email or web address]. By joining [employer]’s FollowMyHealth portal, you will also be able to view your health information using other applications (apps) compatible with our system.

## 2022-11-29 NOTE — ED PROVIDER NOTE - OBJECTIVE STATEMENT
68 yo F BIBEMS with  PMHx  of HTN, anxiety, IBS, hypothyroid with tested positive for covid yesterday.  c/o weakness and n/v/d. 66 yo F BIBEMS with  PMHx  of HTN, anxiety, IBS, hypothyroid with tested positive for covid yesterday.  c/o weakness and n/v/d. unable to keep anything down. +mild cough. + chills.  no cp.

## 2022-11-29 NOTE — ED PROVIDER NOTE - PROGRESS NOTE DETAILS
pt with low potassium. will replete. continue iv hydration. pt signed out pending reEval MD AMITA Stephanie REHMAN: Signout received from Dr. Rodriguez, pending repeat potassium level and reassessment.  Patient is feeling well.  Repeat electrolytes improving.  Will discharge home

## 2022-11-29 NOTE — ED PROVIDER NOTE - PHYSICAL EXAMINATION
Constitutional: NAD AOx3  Eyes: PERRL EOMI  Head: Normocephalic atraumatic  Mouth: MMM  Cardiac: regular rate and rhythm  Resp: Lungs CTAB  GI: Abd s/nd/nt  Neuro: CN2-12 grossly intact, STEVENS x 4  Skin: No visible rashes Constitutional: mild distress, AOx3, pale shaky  Eyes: PERRL EOMI  Head: Normocephalic atraumatic  Mouth: dryMM  Cardiac: regular rate and rhythm  Resp: Lungs CTAB  GI: Abd s/nd/nt  Neuro: CN2-12 grossly intact, STEVENS x 4  Skin: No visible rashes

## 2022-11-30 VITALS
DIASTOLIC BLOOD PRESSURE: 84 MMHG | RESPIRATION RATE: 18 BRPM | HEART RATE: 82 BPM | TEMPERATURE: 98 F | OXYGEN SATURATION: 98 % | SYSTOLIC BLOOD PRESSURE: 128 MMHG

## 2022-11-30 LAB
ANION GAP SERPL CALC-SCNC: 7 MMOL/L — SIGNIFICANT CHANGE UP (ref 5–17)
BUN SERPL-MCNC: 7 MG/DL — SIGNIFICANT CHANGE UP (ref 7–23)
CALCIUM SERPL-MCNC: 7.9 MG/DL — LOW (ref 8.5–10.1)
CHLORIDE SERPL-SCNC: 99 MMOL/L — SIGNIFICANT CHANGE UP (ref 96–108)
CO2 SERPL-SCNC: 24 MMOL/L — SIGNIFICANT CHANGE UP (ref 22–31)
CREAT SERPL-MCNC: 0.44 MG/DL — LOW (ref 0.5–1.3)
EGFR: 105 ML/MIN/1.73M2 — SIGNIFICANT CHANGE UP
GLUCOSE SERPL-MCNC: 94 MG/DL — SIGNIFICANT CHANGE UP (ref 70–99)
POTASSIUM SERPL-MCNC: 4.2 MMOL/L — SIGNIFICANT CHANGE UP (ref 3.5–5.3)
POTASSIUM SERPL-SCNC: 4.2 MMOL/L — SIGNIFICANT CHANGE UP (ref 3.5–5.3)
SODIUM SERPL-SCNC: 130 MMOL/L — LOW (ref 135–145)

## 2022-11-30 RX ORDER — NIRMATRELVIR AND RITONAVIR 150-100 MG
3 KIT ORAL
Qty: 30 | Refills: 0
Start: 2022-11-30 | End: 2022-12-04

## 2022-11-30 RX ADMIN — Medication 100 MILLIEQUIVALENT(S): at 01:14

## 2022-11-30 RX ADMIN — Medication 10 MILLIEQUIVALENT(S): at 01:39

## 2022-12-02 ENCOUNTER — NON-APPOINTMENT (OUTPATIENT)
Age: 67
End: 2022-12-02

## 2022-12-05 ENCOUNTER — NON-APPOINTMENT (OUTPATIENT)
Age: 67
End: 2022-12-05

## 2022-12-05 DIAGNOSIS — R11.0 NAUSEA: ICD-10-CM

## 2023-01-05 ENCOUNTER — APPOINTMENT (OUTPATIENT)
Dept: INTERNAL MEDICINE | Facility: CLINIC | Age: 68
End: 2023-01-05
Payer: MEDICARE

## 2023-01-05 VITALS
WEIGHT: 107 LBS | OXYGEN SATURATION: 95 % | DIASTOLIC BLOOD PRESSURE: 68 MMHG | HEIGHT: 64 IN | BODY MASS INDEX: 18.27 KG/M2 | TEMPERATURE: 99.2 F | SYSTOLIC BLOOD PRESSURE: 128 MMHG | HEART RATE: 82 BPM

## 2023-01-05 DIAGNOSIS — R05.8 OTHER SPECIFIED COUGH: ICD-10-CM

## 2023-01-05 DIAGNOSIS — Z78.9 OTHER SPECIFIED HEALTH STATUS: ICD-10-CM

## 2023-01-05 DIAGNOSIS — J20.9 ACUTE BRONCHITIS, UNSPECIFIED: ICD-10-CM

## 2023-01-05 PROCEDURE — 99213 OFFICE O/P EST LOW 20 MIN: CPT

## 2023-01-05 RX ORDER — PREDNISONE 20 MG/1
20 TABLET ORAL TWICE DAILY
Qty: 14 | Refills: 1 | Status: DISCONTINUED | COMMUNITY
Start: 2022-10-28 | End: 2023-01-05

## 2023-01-05 RX ORDER — AMLODIPINE BESYLATE 2.5 MG/1
2.5 TABLET ORAL
Qty: 180 | Refills: 1 | Status: DISCONTINUED | COMMUNITY
Start: 2021-11-02 | End: 2023-01-05

## 2023-01-05 NOTE — HISTORY OF PRESENT ILLNESS
[FreeTextEntry8] : Ms. Elizondo presents for an acute evaluation.  She is complaining of of cough for the past 3 days.  The cough is productive of clear phlegm.  Its associated with some shortness of breath.  Ms. Elizondo states that she developed pharyngitis approximately 4 days ago.  Patient states that her cough is never really gone away since she had COVID at the end of November 2022.  She is having occasional nocturnal symptoms of cough.

## 2023-01-05 NOTE — PHYSICAL EXAM
[No Acute Distress] : no acute distress [Well Nourished] : well nourished [Well Developed] : well developed [Well-Appearing] : well-appearing [Normal Sclera/Conjunctiva] : normal sclera/conjunctiva [PERRL] : pupils equal round and reactive to light [EOMI] : extraocular movements intact [Normal Outer Ear/Nose] : the outer ears and nose were normal in appearance [Normal Oropharynx] : the oropharynx was normal [No JVD] : no jugular venous distention [No Lymphadenopathy] : no lymphadenopathy [Supple] : supple [Thyroid Normal, No Nodules] : the thyroid was normal and there were no nodules present [No Respiratory Distress] : no respiratory distress  [No Accessory Muscle Use] : no accessory muscle use [Normal Rate] : normal rate  [Regular Rhythm] : with a regular rhythm [Normal S1, S2] : normal S1 and S2 [No Murmur] : no murmur heard [No Carotid Bruits] : no carotid bruits [No Abdominal Bruit] : a ~M bruit was not heard ~T in the abdomen [No Varicosities] : no varicosities [Pedal Pulses Present] : the pedal pulses are present [No Edema] : there was no peripheral edema [No Palpable Aorta] : no palpable aorta [No Extremity Clubbing/Cyanosis] : no extremity clubbing/cyanosis [Soft] : abdomen soft [Non Tender] : non-tender [Non-distended] : non-distended [No Masses] : no abdominal mass palpated [No HSM] : no HSM [Normal Bowel Sounds] : normal bowel sounds [Normal Posterior Cervical Nodes] : no posterior cervical lymphadenopathy [Normal Anterior Cervical Nodes] : no anterior cervical lymphadenopathy [No CVA Tenderness] : no CVA  tenderness [No Spinal Tenderness] : no spinal tenderness [No Joint Swelling] : no joint swelling [Grossly Normal Strength/Tone] : grossly normal strength/tone [No Rash] : no rash [Coordination Grossly Intact] : coordination grossly intact [No Focal Deficits] : no focal deficits [Normal Gait] : normal gait [Deep Tendon Reflexes (DTR)] : deep tendon reflexes were 2+ and symmetric [Normal Affect] : the affect was normal [Normal Insight/Judgement] : insight and judgment were intact [de-identified] : End expiratory rhonchi

## 2023-01-05 NOTE — PLAN
[FreeTextEntry1] : Ms. Elizondo presents for an acute evaluation.  She has not acute bronchitis.  Patient will be given cefuroxime 500 mg p.o. twice daily x7 days and prednisone 20 mg p.o. twice daily x7 days.  She will notify this office if there is no significant provement in symptoms.

## 2023-01-06 ENCOUNTER — RX RENEWAL (OUTPATIENT)
Age: 68
End: 2023-01-06

## 2023-01-10 ENCOUNTER — NON-APPOINTMENT (OUTPATIENT)
Age: 68
End: 2023-01-10

## 2023-01-13 DIAGNOSIS — E87.6 HYPOKALEMIA: ICD-10-CM

## 2023-01-18 ENCOUNTER — APPOINTMENT (OUTPATIENT)
Dept: INTERNAL MEDICINE | Facility: CLINIC | Age: 68
End: 2023-01-18

## 2023-01-28 NOTE — ED ADULT NURSE NOTE - CHIEF COMPLAINT
The patient is a 64y Female complaining of abdominal pain.
MSK XR negative - No fracture, No dislocation, No foreign body

## 2023-03-23 ENCOUNTER — EMERGENCY (EMERGENCY)
Facility: HOSPITAL | Age: 68
LOS: 0 days | Discharge: HOME | End: 2023-03-24
Attending: EMERGENCY MEDICINE
Payer: MEDICARE

## 2023-03-23 VITALS — WEIGHT: 104.94 LBS | HEIGHT: 61 IN

## 2023-03-23 DIAGNOSIS — E87.6 HYPOKALEMIA: ICD-10-CM

## 2023-03-23 DIAGNOSIS — K21.9 GASTRO-ESOPHAGEAL REFLUX DISEASE WITHOUT ESOPHAGITIS: ICD-10-CM

## 2023-03-23 DIAGNOSIS — Z88.8 ALLERGY STATUS TO OTHER DRUGS, MEDICAMENTS AND BIOLOGICAL SUBSTANCES: ICD-10-CM

## 2023-03-23 DIAGNOSIS — E03.9 HYPOTHYROIDISM, UNSPECIFIED: ICD-10-CM

## 2023-03-23 DIAGNOSIS — K52.9 NONINFECTIVE GASTROENTERITIS AND COLITIS, UNSPECIFIED: ICD-10-CM

## 2023-03-23 DIAGNOSIS — R10.9 UNSPECIFIED ABDOMINAL PAIN: ICD-10-CM

## 2023-03-23 DIAGNOSIS — R19.7 DIARRHEA, UNSPECIFIED: ICD-10-CM

## 2023-03-23 DIAGNOSIS — F41.9 ANXIETY DISORDER, UNSPECIFIED: ICD-10-CM

## 2023-03-23 DIAGNOSIS — I10 ESSENTIAL (PRIMARY) HYPERTENSION: ICD-10-CM

## 2023-03-23 DIAGNOSIS — Z88.5 ALLERGY STATUS TO NARCOTIC AGENT: ICD-10-CM

## 2023-03-23 DIAGNOSIS — R11.2 NAUSEA WITH VOMITING, UNSPECIFIED: ICD-10-CM

## 2023-03-23 LAB
ADD ON TEST-SPECIMEN IN LAB: SIGNIFICANT CHANGE UP
ALBUMIN SERPL ELPH-MCNC: 4 G/DL — SIGNIFICANT CHANGE UP (ref 3.3–5)
ALP SERPL-CCNC: 40 U/L — SIGNIFICANT CHANGE UP (ref 40–120)
ALT FLD-CCNC: 33 U/L — SIGNIFICANT CHANGE UP (ref 12–78)
ANION GAP SERPL CALC-SCNC: 8 MMOL/L — SIGNIFICANT CHANGE UP (ref 5–17)
AST SERPL-CCNC: 21 U/L — SIGNIFICANT CHANGE UP (ref 15–37)
BASOPHILS # BLD AUTO: 0.02 K/UL — SIGNIFICANT CHANGE UP (ref 0–0.2)
BASOPHILS NFR BLD AUTO: 0.2 % — SIGNIFICANT CHANGE UP (ref 0–2)
BILIRUB SERPL-MCNC: 0.3 MG/DL — SIGNIFICANT CHANGE UP (ref 0.2–1.2)
BUN SERPL-MCNC: 13 MG/DL — SIGNIFICANT CHANGE UP (ref 7–23)
CALCIUM SERPL-MCNC: 9.2 MG/DL — SIGNIFICANT CHANGE UP (ref 8.5–10.1)
CHLORIDE SERPL-SCNC: 95 MMOL/L — LOW (ref 96–108)
CO2 SERPL-SCNC: 27 MMOL/L — SIGNIFICANT CHANGE UP (ref 22–31)
CREAT SERPL-MCNC: 0.82 MG/DL — SIGNIFICANT CHANGE UP (ref 0.5–1.3)
EGFR: 78 ML/MIN/1.73M2 — SIGNIFICANT CHANGE UP
EOSINOPHIL # BLD AUTO: 0 K/UL — SIGNIFICANT CHANGE UP (ref 0–0.5)
EOSINOPHIL NFR BLD AUTO: 0 % — SIGNIFICANT CHANGE UP (ref 0–6)
GLUCOSE SERPL-MCNC: 114 MG/DL — HIGH (ref 70–99)
HCT VFR BLD CALC: 44.6 % — SIGNIFICANT CHANGE UP (ref 34.5–45)
HGB BLD-MCNC: 15.4 G/DL — SIGNIFICANT CHANGE UP (ref 11.5–15.5)
IMM GRANULOCYTES NFR BLD AUTO: 0.3 % — SIGNIFICANT CHANGE UP (ref 0–0.9)
LIDOCAIN IGE QN: 174 U/L — SIGNIFICANT CHANGE UP (ref 73–393)
LYMPHOCYTES # BLD AUTO: 0.9 K/UL — LOW (ref 1–3.3)
LYMPHOCYTES # BLD AUTO: 9.7 % — LOW (ref 13–44)
MAGNESIUM SERPL-MCNC: 2.2 MG/DL — SIGNIFICANT CHANGE UP (ref 1.6–2.6)
MCHC RBC-ENTMCNC: 29.4 PG — SIGNIFICANT CHANGE UP (ref 27–34)
MCHC RBC-ENTMCNC: 34.5 GM/DL — SIGNIFICANT CHANGE UP (ref 32–36)
MCV RBC AUTO: 85.1 FL — SIGNIFICANT CHANGE UP (ref 80–100)
MONOCYTES # BLD AUTO: 1.07 K/UL — HIGH (ref 0–0.9)
MONOCYTES NFR BLD AUTO: 11.5 % — SIGNIFICANT CHANGE UP (ref 2–14)
NEUTROPHILS # BLD AUTO: 7.28 K/UL — SIGNIFICANT CHANGE UP (ref 1.8–7.4)
NEUTROPHILS NFR BLD AUTO: 78.3 % — HIGH (ref 43–77)
PLATELET # BLD AUTO: 350 K/UL — SIGNIFICANT CHANGE UP (ref 150–400)
POTASSIUM SERPL-MCNC: 2.6 MMOL/L — CRITICAL LOW (ref 3.5–5.3)
POTASSIUM SERPL-SCNC: 2.6 MMOL/L — CRITICAL LOW (ref 3.5–5.3)
PROT SERPL-MCNC: 7.6 GM/DL — SIGNIFICANT CHANGE UP (ref 6–8.3)
RBC # BLD: 5.24 M/UL — HIGH (ref 3.8–5.2)
RBC # FLD: 12.7 % — SIGNIFICANT CHANGE UP (ref 10.3–14.5)
SODIUM SERPL-SCNC: 130 MMOL/L — LOW (ref 135–145)
WBC # BLD: 9.3 K/UL — SIGNIFICANT CHANGE UP (ref 3.8–10.5)
WBC # FLD AUTO: 9.3 K/UL — SIGNIFICANT CHANGE UP (ref 3.8–10.5)

## 2023-03-23 PROCEDURE — 99284 EMERGENCY DEPT VISIT MOD MDM: CPT | Mod: 25

## 2023-03-23 PROCEDURE — 99284 EMERGENCY DEPT VISIT MOD MDM: CPT | Mod: FS

## 2023-03-23 PROCEDURE — 36415 COLL VENOUS BLD VENIPUNCTURE: CPT

## 2023-03-23 PROCEDURE — 85025 COMPLETE CBC W/AUTO DIFF WBC: CPT

## 2023-03-23 PROCEDURE — 96375 TX/PRO/DX INJ NEW DRUG ADDON: CPT

## 2023-03-23 PROCEDURE — 80053 COMPREHEN METABOLIC PANEL: CPT

## 2023-03-23 PROCEDURE — 83690 ASSAY OF LIPASE: CPT

## 2023-03-23 PROCEDURE — 96365 THER/PROPH/DIAG IV INF INIT: CPT

## 2023-03-23 PROCEDURE — 83735 ASSAY OF MAGNESIUM: CPT

## 2023-03-23 RX ORDER — POTASSIUM CHLORIDE 20 MEQ
10 PACKET (EA) ORAL ONCE
Refills: 0 | Status: COMPLETED | OUTPATIENT
Start: 2023-03-23 | End: 2023-03-23

## 2023-03-23 RX ORDER — ONDANSETRON 8 MG/1
4 TABLET, FILM COATED ORAL ONCE
Refills: 0 | Status: COMPLETED | OUTPATIENT
Start: 2023-03-23 | End: 2023-03-23

## 2023-03-23 RX ORDER — FAMOTIDINE 10 MG/ML
20 INJECTION INTRAVENOUS ONCE
Refills: 0 | Status: COMPLETED | OUTPATIENT
Start: 2023-03-23 | End: 2023-03-23

## 2023-03-23 RX ORDER — SODIUM CHLORIDE 9 MG/ML
2000 INJECTION INTRAMUSCULAR; INTRAVENOUS; SUBCUTANEOUS ONCE
Refills: 0 | Status: COMPLETED | OUTPATIENT
Start: 2023-03-23 | End: 2023-03-23

## 2023-03-23 RX ORDER — POTASSIUM CHLORIDE 20 MEQ
40 PACKET (EA) ORAL ONCE
Refills: 0 | Status: COMPLETED | OUTPATIENT
Start: 2023-03-23 | End: 2023-03-23

## 2023-03-23 RX ADMIN — Medication 100 MILLIEQUIVALENT(S): at 23:30

## 2023-03-23 RX ADMIN — ONDANSETRON 4 MILLIGRAM(S): 8 TABLET, FILM COATED ORAL at 22:09

## 2023-03-23 RX ADMIN — SODIUM CHLORIDE 2000 MILLILITER(S): 9 INJECTION INTRAMUSCULAR; INTRAVENOUS; SUBCUTANEOUS at 22:08

## 2023-03-23 RX ADMIN — FAMOTIDINE 20 MILLIGRAM(S): 10 INJECTION INTRAVENOUS at 22:08

## 2023-03-23 NOTE — ED STATDOCS - PROGRESS NOTE DETAILS
66 y/o F with PMH of hypothyroid, IBS, GERD, HTN presents with N/V/D x 2 days. No sick contacts, fever. No improvement with nausea medicine at home. Has been unable to tolerate PO today. PE: Well appearing. Cardiac: s1s2, RRR. Lungs: CTAB. Abdomen: NBS x4, soft, nontender. A/P: LIkely AGE. plan for labs, IVF, antiemetics, reassess. - Justin Webb PA-C JG:  Patient tolerating PO at this time.  Potassium replaced  and patient wants to go home.  Will prescribe zofran.

## 2023-03-23 NOTE — ED STATDOCS - ATTENDING APP SHARED VISIT CONTRIBUTION OF CARE
Attending Contribution to Care: I, Yamila Bunn, performed the initial face to face bedside interview with this patient regarding history of present illness, review of symptoms and relevant past medical, social and family history.  I completed an independent physical examination.  I was the initial provider who evaluated this patient. I have signed out the follow up of any pending tests (i.e. labs, radiological studies) to the ACP.  I have communicated the patient’s plan of care and disposition with the ACP.

## 2023-03-23 NOTE — ED STATDOCS - OBJECTIVE STATEMENT
66 yo F with PMHx of hypothyroidism, IBS, GERD, HTN, presents to ED c/o v/d x 2 days. Pt was here a week ago to visit , got sick starting Tuesday (2 days ago) with abdominal pain, diarrhea, vomiting. Denies abdominal surgeries. States she could not tolerate any PO intake and is dehydrated. Denies blood in stool, fever. Took nausea pills that was left over from prescription last year.  PCP: Dr. Telles

## 2023-03-23 NOTE — ED ADULT NURSE NOTE - OBJECTIVE STATEMENT
Pt presents to ED c/o diarrhea and nausea since Tuesday, states her  brought "a bug home" and had similar symptoms. Hx of IBS, states she often gets hypokalemic when this occurs. respirations even and unlabored. skin warm and dry. good skin turgor. iv placed, labs drawn and sent, left FA 20 ga. will continue to monitor awaiting results. meds given as per orders

## 2023-03-23 NOTE — ED STATDOCS - CLINICAL SUMMARY MEDICAL DECISION MAKING FREE TEXT BOX
66 yo F with likely acute viral gastroenteritis. Plan: labs, Pepcid, Zofran IV fluids, reevaluation.
 2007

## 2023-03-23 NOTE — PROVIDER CONTACT NOTE (CRITICAL VALUE NOTIFICATION) - DATE AND TIME:
23-Mar-2023 22:50 Health Maintenance Due   Topic Date Due   • Pneumococcal Vaccine 0-64 (1 of 1 - PPSV23) 04/27/1977   • Influenza Vaccine (1) 09/01/2019       Patient is due for topics as listed above but is not proceeding with Immunization(s) Influenza and Pneumococcal at this time.

## 2023-03-23 NOTE — ED STATDOCS - CARE PROVIDER_API CALL
David Telles)  Internal Medicine; Pulmonary Disease  [address], [place] [address]  Phone: [phone]  Fax: [phone]  Follow Up Time:

## 2023-03-23 NOTE — ED ADULT TRIAGE NOTE - CHIEF COMPLAINT QUOTE
Ambulatory to ER with c/o emesis; diarrhea x 2 days. Patient denies fever, no medication taken prior to arrival.

## 2023-03-23 NOTE — ED STATDOCS - NSFOLLOWUPINSTRUCTIONS_ED_ALL_ED_FT
See your doctor within 1-2 days.  Stay hydrated.   take zofran for nausea.                                                                                                                       Viral Gastroenteritis, Adult    Body outline showing the digestive tract, including the stomach, small intestine, and large intestine.   Viral gastroenteritis is also known as the stomach flu. This condition may affect your stomach, small intestine, and large intestine. It can cause sudden watery diarrhea, fever, and vomiting. This condition is caused by many different viruses. These viruses can be passed from person to person very easily (are contagious).    Diarrhea and vomiting can make you feel weak and cause you to become dehydrated. You may not be able to keep fluids down. Dehydration can make you tired and thirsty, cause you to have a dry mouth, and decrease how often you urinate. It is important to replace the fluids that you lose from diarrhea and vomiting.      What are the causes?    Gastroenteritis is caused by many viruses, including rotavirus and norovirus. Norovirus is the most common cause in adults. You can get sick after being exposed to the viruses from other people. You can also get sick by:  •Eating food, drinking water, or touching a surface contaminated with one of these viruses.      •Sharing utensils or other personal items with an infected person.        What increases the risk?    You are more likely to develop this condition if you:  •Have a weak body defense system (immune system).      •Live with one or more children who are younger than 2 years.      •Live in a nursing home.      •Travel on cruise ships.        What are the signs or symptoms?    Symptoms of this condition start suddenly 1–3 days after exposure to a virus. Symptoms may last for a few days or for as long as a week. Common symptoms include watery diarrhea and vomiting. Other symptoms include:  •Fever.      •Headache.      •Fatigue.      •Pain in the abdomen.      •Chills.      •Weakness.      •Nausea.      •Muscle aches.      •Loss of appetite.        How is this diagnosed?    This condition is diagnosed with a medical history and physical exam. You may also have a stool test to check for viruses or other infections.      How is this treated?    This condition typically goes away on its own. The focus of treatment is to prevent dehydration and restore lost fluids (rehydration). This condition may be treated with:  •An oral rehydration solution (ORS) to replace important salts and minerals (electrolytes) in your body. Take this if told by your health care provider. This is a drink that is sold at pharmacies and retail stores.      •Medicines to help with your symptoms.      •Probiotic supplements to reduce symptoms of diarrhea.      •Fluids given through an IV, if dehydration is severe.      Older adults and people with other diseases or a weak immune system are at higher risk for dehydration.      Follow these instructions at home:      Eating and drinking   A comparison of three sample cups showing dark yellow, yellow, and pale yellow urine.   •Take an ORS as told by your health care provider.    •Drink clear fluids in small amounts as you are able. Clear fluids include:  •Water.      •Ice chips.      •Diluted fruit juice.      •Low-calorie sports drinks.        •Drink enough fluid to keep your urine pale yellow.      •Eat small amounts of healthy foods every 3–4 hours as you are able. This may include whole grains, fruits, vegetables, lean meats, and yogurt.      •Avoid fluids that contain a lot of sugar or caffeine, such as energy drinks, sports drinks, and soda.      •Avoid spicy or fatty foods.      •Avoid alcohol.        General instructions   Washing hands with soap and water.   •Wash your hands often, especially after having diarrhea or vomiting. If soap and water are not available, use hand .      •Make sure that all people in your household wash their hands well and often.      •Take over-the-counter and prescription medicines only as told by your health care provider.      •Rest at home while you recover.      •Watch your condition for any changes.      •Take a warm bath to relieve any burning or pain from frequent diarrhea episodes.      •Keep all follow-up visits. This is important.        Contact a health care provider if you:    •Cannot keep fluids down.      •Have symptoms that get worse.      •Have new symptoms.      •Feel light-headed or dizzy.      •Have muscle cramps.        Get help right away if you:    •Have chest pain.      •Have trouble breathing or you are breathing very quickly.      •Have a fast heartbeat.      •Feel extremely weak or you faint.      •Have a severe headache, a stiff neck, or both.      •Have a rash.      •Have severe pain, cramping, or bloating in your abdomen.      •Have skin that feels cold and clammy.      •Feel confused.      •Have pain when you urinate.    •Have signs of dehydration, such as:  •Dark urine, very little urine, or no urine.      •Cracked lips.      •Dry mouth.      •Sunken eyes.      •Sleepiness.      •Weakness.      •Have signs of bleeding, such as:  •Seeing blood in your vomit.      •Having vomit that looks like coffee grounds.      •Having bloody or black stools or stools that look like tar.        These symptoms may be an emergency. Get help right away. Call 911.   • Do not wait to see if the symptoms will go away.       • Do not drive yourself to the hospital.         Summary    •Viral gastroenteritis is also known as the stomach flu. It can cause sudden watery diarrhea, fever, and vomiting.      •This condition can be passed from person to person very easily (is contagious).      •Take an oral rehydration solution (ORS) if told by your health care provider. This is a drink that is sold at pharmacies and retail stores.      •Wash your hands often, especially after having diarrhea or vomiting. If soap and water are not available, use hand .      This information is not intended to replace advice given to you by your health care provider. Make sure you discuss any questions you have with your health care provider.      Document Revised: 10/17/2022 Document Reviewed: 10/17/2022    Elsevier Patient Education © 2023 [employer]

## 2023-03-23 NOTE — ED STATDOCS - PATIENT PORTAL LINK FT
You can access the FollowMyHealth Patient Portal offered by [facility] by registering at the following website: [email or web address]. By joining [facility]’s FollowMyHealth portal, you will also be able to view your health information using other applications (apps) compatible with our system.

## 2023-03-23 NOTE — ED STATDOCS - CHPI ED CONTEXT
sick contacts [Fatigue] : fatigue [Recent Weight Gain (___ Lbs)] : recent weight gain: [unfilled] lbs [Back Pain] : back pain [Dizziness] : dizziness [Cold Intolerance] : cold intolerance [Swelling] : swelling [Negative] : Eyes [Polyuria] : no polyuria [Hair Loss] : no hair loss [Polydipsia] : no polydipsia

## 2023-03-24 VITALS
DIASTOLIC BLOOD PRESSURE: 82 MMHG | OXYGEN SATURATION: 98 % | RESPIRATION RATE: 18 BRPM | HEART RATE: 98 BPM | SYSTOLIC BLOOD PRESSURE: 141 MMHG | TEMPERATURE: 98 F

## 2023-03-24 RX ORDER — ONDANSETRON 8 MG/1
1 TABLET, FILM COATED ORAL
Qty: 10 | Refills: 0
Start: 2023-03-24

## 2023-03-24 RX ADMIN — Medication 40 MILLIEQUIVALENT(S): at 00:08

## 2023-03-24 RX ADMIN — Medication 100 MILLIEQUIVALENT(S): at 00:35

## 2023-03-24 RX ADMIN — Medication 10 MILLIEQUIVALENT(S): at 00:30

## 2023-04-05 LAB
ANION GAP SERPL CALC-SCNC: 16 MMOL/L
BUN SERPL-MCNC: 10 MG/DL
CALCIUM SERPL-MCNC: 9.8 MG/DL
CHLORIDE SERPL-SCNC: 96 MMOL/L
CO2 SERPL-SCNC: 26 MMOL/L
CREAT SERPL-MCNC: 0.82 MG/DL
EGFR: 78 ML/MIN/1.73M2
GLUCOSE SERPL-MCNC: 94 MG/DL
POTASSIUM SERPL-SCNC: 3.6 MMOL/L
SODIUM SERPL-SCNC: 138 MMOL/L

## 2023-04-06 ENCOUNTER — NON-APPOINTMENT (OUTPATIENT)
Age: 68
End: 2023-04-06

## 2023-04-21 RX ORDER — ACYCLOVIR 50 MG/G
5 OINTMENT TOPICAL 3 TIMES DAILY
Qty: 1 | Refills: 1 | Status: ACTIVE | COMMUNITY
Start: 2020-06-15 | End: 1900-01-01

## 2023-04-27 ENCOUNTER — RX RENEWAL (OUTPATIENT)
Age: 68
End: 2023-04-27

## 2023-04-28 NOTE — ED PROVIDER NOTE - WET READ LAUNCH FT
There are no Wet Read(s) to document. There is 1 Wet Read(s) to document. Bexarotene Counseling:  I discussed with the patient the risks of bexarotene including but not limited to hair loss, dry lips/skin/eyes, liver abnormalities, hyperlipidemia, pancreatitis, depression/suicidal ideation, photosensitivity, drug rash/allergic reactions, hypothyroidism, anemia, leukopenia, infection, cataracts, and teratogenicity.  Patient understands that they will need regular blood tests to check lipid profile, liver function tests, white blood cell count, thyroid function tests and pregnancy test if applicable.

## 2023-05-25 ENCOUNTER — APPOINTMENT (OUTPATIENT)
Dept: OBGYN | Facility: CLINIC | Age: 68
End: 2023-05-25
Payer: MEDICARE

## 2023-05-25 VITALS
WEIGHT: 116 LBS | BODY MASS INDEX: 19.81 KG/M2 | DIASTOLIC BLOOD PRESSURE: 70 MMHG | SYSTOLIC BLOOD PRESSURE: 130 MMHG | HEIGHT: 64 IN

## 2023-05-25 DIAGNOSIS — Z12.31 ENCOUNTER FOR SCREENING MAMMOGRAM FOR MALIGNANT NEOPLASM OF BREAST: ICD-10-CM

## 2023-05-25 DIAGNOSIS — Z13.820 ENCOUNTER FOR SCREENING FOR OSTEOPOROSIS: ICD-10-CM

## 2023-05-25 DIAGNOSIS — M81.0 AGE-RELATED OSTEOPOROSIS W/OUT CURRENT PATHOLOGICAL FRACTURE: ICD-10-CM

## 2023-05-25 DIAGNOSIS — R92.2 INCONCLUSIVE MAMMOGRAM: ICD-10-CM

## 2023-05-25 PROCEDURE — G0101: CPT

## 2023-05-25 NOTE — HISTORY OF PRESENT ILLNESS
[FreeTextEntry1] : 67 yo here for av. SHe has no  bleeding , no hot flashes or vag dryness. SHe has a lot of cancer fhx but doesn’t want to be tested.  SHe sees a rheumatologist and is on prolia  bones have imnproved [Patient reported mammogram was normal] : Patient reported mammogram was normal [Patient reported PAP Smear was normal] : Patient reported PAP Smear was normal [Mammogramdate] : 6/22 [TextBox_19] : dense brease [PapSmeardate] : 2022 [ColonoscopyDate] : 5/22 [Previously active] : previously active

## 2023-05-25 NOTE — PHYSICAL EXAM
[Chaperone Declined] : Patient declined chaperone [Appropriately responsive] : appropriately responsive [Alert] : alert [No Acute Distress] : no acute distress [No Lymphadenopathy] : no lymphadenopathy [Soft] : soft [Non-tender] : non-tender [Non-distended] : non-distended [No HSM] : No HSM [No Lesions] : no lesions [No Mass] : no mass [Oriented x3] : oriented x3 [Examination Of The Breasts] : a normal appearance [No Masses] : no breast masses were palpable [Labia Majora] : normal [Labia Minora] : normal [Normal] : normal [Uterine Adnexae] : normal [Declined] : Patient declined rectal exam

## 2023-05-25 NOTE — DISCUSSION/SUMMARY
[FreeTextEntry1] : Discussed with the pt the importance of exercise weight bearing,yoga, aerobics good variety, \par calcium totalling  mg between food and vitamins also vitamin D 2000iu daily, fish oil. \par ALso that any drop of blood after menopause is not good. Encouraged SBE\par FU in one year. \par  Discussed vaginal lubricants OTC like luvena moisturizers,ky ovule and relplense . There are also vaginal estrogens, and coconut oil with intercourse.\par she sees  derma \par

## 2023-06-20 LAB — CYTOLOGY CVX/VAG DOC THIN PREP: ABNORMAL

## 2023-09-18 NOTE — ED ADULT TRIAGE NOTE - NS ED TRIAGE AVPU SCALE
Alert-The patient is alert, awake and responds to voice. The patient is oriented to time, place, and person. The triage nurse is able to obtain subjective information. Detail Level: Detailed Lesion Type: Cyst Method: 11 blade Curette: Yes Include Sutures?: No Anesthesia Type: 1% lidocaine with epinephrine Size Of Lesion In Cm (Optional But May Be Required For Some Insurances): 0 Wound Care: Petrolatum Dressing: dry sterile dressing Epidermal Sutures: 4-0 Ethilon Epidermal Closure: simple interrupted Suture Text: The incision was partially closed with Preparation Text: The area was prepped in the usual clean fashion. Curette Text (Optional): After the contents were expressed a curette was used to partially remove the cyst wall. Consent was obtained and risks were reviewed including but not limited to delayed wound healing, infection, need for multiple I and D's, and pain. Post-Care Instructions: I reviewed with the patient in detail post-care instructions. Patient should keep wound covered and call the office should any redness, pain, swelling or worsening occur.

## 2023-10-09 ENCOUNTER — NON-APPOINTMENT (OUTPATIENT)
Age: 68
End: 2023-10-09

## 2023-11-02 ENCOUNTER — APPOINTMENT (OUTPATIENT)
Dept: INTERNAL MEDICINE | Facility: CLINIC | Age: 68
End: 2023-11-02

## 2023-11-10 ENCOUNTER — APPOINTMENT (OUTPATIENT)
Dept: INTERNAL MEDICINE | Facility: CLINIC | Age: 68
End: 2023-11-10
Payer: MEDICARE

## 2023-11-10 VITALS
BODY MASS INDEX: 18.61 KG/M2 | SYSTOLIC BLOOD PRESSURE: 122 MMHG | OXYGEN SATURATION: 97 % | HEIGHT: 64 IN | TEMPERATURE: 97.6 F | WEIGHT: 109 LBS | DIASTOLIC BLOOD PRESSURE: 80 MMHG | HEART RATE: 76 BPM

## 2023-11-10 DIAGNOSIS — Z00.00 ENCOUNTER FOR GENERAL ADULT MEDICAL EXAMINATION W/OUT ABNORMAL FINDINGS: ICD-10-CM

## 2023-11-10 DIAGNOSIS — I10 ESSENTIAL (PRIMARY) HYPERTENSION: ICD-10-CM

## 2023-11-10 DIAGNOSIS — E03.9 HYPOTHYROIDISM, UNSPECIFIED: ICD-10-CM

## 2023-11-10 PROCEDURE — G0439: CPT

## 2023-11-10 PROCEDURE — 90715 TDAP VACCINE 7 YRS/> IM: CPT | Mod: GY

## 2023-11-10 PROCEDURE — G0008: CPT | Mod: 59

## 2023-11-10 PROCEDURE — 90471 IMMUNIZATION ADMIN: CPT

## 2023-11-10 PROCEDURE — 90662 IIV NO PRSV INCREASED AG IM: CPT

## 2023-11-10 RX ORDER — PREDNISONE 20 MG/1
20 TABLET ORAL TWICE DAILY
Qty: 14 | Refills: 1 | Status: DISCONTINUED | COMMUNITY
Start: 2023-01-05 | End: 2023-11-10

## 2023-11-10 RX ORDER — ONDANSETRON 4 MG/1
4 TABLET, ORALLY DISINTEGRATING ORAL
Qty: 10 | Refills: 0 | Status: DISCONTINUED | COMMUNITY
Start: 2022-12-05 | End: 2023-11-10

## 2023-11-10 RX ORDER — METRONIDAZOLE 7.5 MG/G
0.75 CREAM TOPICAL
Qty: 45 | Refills: 0 | Status: DISCONTINUED | COMMUNITY
Start: 2020-02-13 | End: 2023-11-10

## 2023-11-10 RX ORDER — AMLODIPINE BESYLATE 5 MG/1
5 TABLET ORAL
Qty: 90 | Refills: 0 | Status: DISCONTINUED | COMMUNITY
Start: 2021-12-22 | End: 2023-11-10

## 2023-11-10 RX ORDER — DILTIAZEM HYDROCHLORIDE 240 MG/1
240 TABLET, EXTENDED RELEASE ORAL
Refills: 0 | Status: ACTIVE | COMMUNITY
Start: 2023-11-10

## 2023-11-10 RX ORDER — CEFUROXIME AXETIL 500 MG/1
500 TABLET ORAL
Qty: 14 | Refills: 0 | Status: DISCONTINUED | COMMUNITY
Start: 2023-01-05 | End: 2023-11-10

## 2023-11-28 ENCOUNTER — NON-APPOINTMENT (OUTPATIENT)
Age: 68
End: 2023-11-28

## 2023-11-28 RX ORDER — BUTALBITAL, ACETAMINOPHEN AND CAFFEINE 325; 50; 40 MG/1; MG/1; MG/1
50-325-40 TABLET ORAL
Qty: 90 | Refills: 0 | Status: ACTIVE | COMMUNITY
Start: 2017-04-02 | End: 1900-01-01

## 2023-11-28 RX ORDER — ALPRAZOLAM 0.5 MG/1
0.5 TABLET ORAL
Qty: 60 | Refills: 0 | Status: ACTIVE | COMMUNITY
Start: 1900-01-01 | End: 1900-01-01

## 2023-12-08 ENCOUNTER — RX RENEWAL (OUTPATIENT)
Age: 68
End: 2023-12-08

## 2024-01-05 NOTE — ED PROVIDER NOTE - NSFOLLOWUPINSTRUCTIONS_ED_ALL_ED_FT
[Time Spent: ___ minutes] : I have spent [unfilled] minutes of time on the encounter. PeytonnianCanadiconnor [facility]ish[facility] CenterianSpanishTagalogTraditional ChineseVietnamfreddy                                                                                                                              COVID-19: What to Do if You Are Sick      If you test positive and are an older adult or someone who is at high risk of getting very sick from COVID-19, treatment may be available. Contact a healthcare provider right away after a positive test to determine if you are eligible, even if your symptoms are mild right now. You can also visit a Test to Treat location and, if eligible, receive a prescription from a provider. Don't delay: Treatment must be started within the first few days to be effective.    If you have a fever, cough, or other symptoms, you might have COVID-19. Most people have mild illness and are able to recover at home. If you are sick:  •Keep track of your symptoms.      •If you have an emergency warning sign (including trouble breathing), call 911.        Steps to help prevent the spread of COVID-19 if you are sick    If you are sick with COVID-19 or think you might have COVID-19, follow the steps below to care for yourself and to help protect other people in your home and community.    Stay home except to get medical care     • Stay home. Most people with COVID-19 have mild illness and can recover at home without medical care. Do not leave your home, except to get medical care. Do not visit public areas and do not go to places where you are unable to wear a mask.      • Take care of yourself. Get rest and stay hydrated. Take over-the-counter medicines, such as acetaminophen, to help you feel better.      • Stay in touch with your doctor. Call before you get medical care. Be sure to get care if you have trouble breathing, or have any other emergency warning signs, or if you think it is an emergency.      • Avoid public transportation, ride-sharing, or taxis if possible.      Get tested     •If you have symptoms of COVID-19, get tested. While waiting for test results, stay away from others, including staying apart from those living in your household.      • Get tested as soon as possible after your symptoms start. Treatments may be available for people with COVID-19 who are at risk for becoming very sick. Don't delay: Treatment must be started early to be effective—some treatments must begin within 5 days of your first symptoms. Contact your healthcare provider right away if your test result is positive to determine if you are eligible.      • Self-tests are one of several options for testing for the virus that causes COVID-19 and may be more convenient than laboratory-based tests and point-of-care tests. Ask your healthcare provider or your local health department if you need help interpreting your test results.      •You can visit your [facility], [facility], [facility], and [facility] health department's website to look for the latest local information on testing sites.      Separate yourself from other people     As much as possible, stay in a specific room and away from other people and pets in your home. If possible, you should use a separate bathroom. If you need to be around other people or animals in or outside of the home, wear a well-fitting mask.    Tell your close contacts that they may have been exposed to COVID-19. An infected person can spread COVID-19 starting 48 hours (or 2 days) before the person has any symptoms or tests positive. By letting your close contacts know they may have been exposed to COVID-19, you are helping to protect everyone.  •See COVID-19 and Animals if you have questions about pets.      •If you are diagnosed with COVID-19, someone from the health department may call you. Answer the call to slow the spread.      Monitor your symptoms    •Symptoms of COVID-19 include fever, cough, or other symptoms.       •Follow care instructions from your healthcare provider and local health department. Your local health authorities may give instructions on checking your symptoms and reporting information.      When to seek emergency medical attention    Look for emergency warning signs* for COVID-19. If someone is showing any of these signs, seek emergency medical care immediately:  •Trouble breathing      •Persistent pain or pressure in the chest      •New confusion      •Inability to wake or stay awake      •Pale, gray, or blue-colored skin, lips, or nail beds, depending on skin tone      *This list is not all possible symptoms. Please call your medical provider for any other symptoms that are severe or concerning to you.    Call 911 or call ahead to your local emergency facility: Notify the  that you are seeking care for someone who has or may have COVID-19.    Call ahead before visiting your doctor    •Call ahead. Many medical visits for routine care are being postponed or done by phone or telemedicine.      •If you have a medical appointment that cannot be postponed, call your doctor's office, and tell them you have or may have COVID-19. This will help the office protect themselves and other patients.      If you are sick, wear a well-fitting mask    •You should wear a mask if you must be around other people or animals, including pets (even at home).      •Wear a mask with the best fit, protection, and comfort for you.      •You don't need to wear the mask if you are alone. If you can't put on a mask (because of trouble breathing, for example), cover your coughs and sneezes in some other way. Try to stay at least 6 feet away from other people. This will help protect the people around you.      •Masks should not be placed on young children under age 2 years, anyone who has trouble breathing, or anyone who is not able to remove the mask without help.      Cover your coughs and sneezes    •Cover your mouth and nose with a tissue when you cough or sneeze.      •Throw away used tissues in a lined trash can.      •Immediately wash your hands with soap and water for at least 20 seconds. If soap and water are not available, clean your hands with an alcohol-based hand  that contains at least 60% alcohol.      Clean your hands often    •Wash your hands often with soap and water for at least 20 seconds. This is especially important after blowing your nose, coughing, or sneezing; going to the bathroom; and before eating or preparing food.      •Use hand  if soap and water are not available. Use an alcohol-based hand  with at least 60% alcohol, covering all surfaces of your hands and rubbing them together until they feel dry.      •Soap and water are the best option, especially if hands are visibly dirty.       •Avoid touching your eyes, nose, and mouth with unwashed hands.      •Handwashing Tips      Avoid sharing personal household items    •Do not share dishes, drinking glasses, cups, eating utensils, towels, or bedding with other people in your home.      •Wash these items thoroughly after using them with soap and water or put in the .      Clean surfaces in your home regularly    •Clean and disinfect high-touch surfaces (for example, doorknobs, tables, handles, light switches, and countertops) in your "sick room" and bathroom. In shared spaces, you should clean and disinfect surfaces and items after each use by the person who is ill.      •If you are sick and cannot clean, a caregiver or other person should only clean and disinfect the area around you (such as your bedroom and bathroom) on an as needed basis. Your caregiver/other person should wait as long as possible (at least several hours) and wear a mask before entering, cleaning, and disinfecting shared spaces that you use.      •Clean and disinfect areas that may have blood, stool, or body fluids on them.    •Use household  and disinfectants. Clean visible dirty surfaces with household  containing soap or detergent. Then, use a household disinfectant.  •Use a product from EPA's List N: Disinfectants for Coronavirus (COVID-19).      •Be sure to follow the instructions on the label to ensure safe and effective use of the product. Many products recommend keeping the surface wet with a disinfectant for a certain period of time (look at "contact time" on the product label).      •You may also need to wear personal protective equipment, such as gloves, depending on the directions on the product label.      •Immediately after disinfecting, wash your hands with soap and water for 20 seconds.      •For completed guidance on cleaning and disinfecting your home, visit Complete Disinfection Guidance.        Take steps to improve ventilation at home    •Improve ventilation (air flow) at home to help prevent from spreading COVID-19 to other people in your household.      •Clear out COVID-19 virus particles in the air by opening windows, using air filters, and turning on fans in your home.      •Use this interactive tool to learn how to improve air flow in your home.        When you can be around others after being sick with COVID-19    Deciding when you can be around others is different for different situations. Find out when you can safely end home isolation.    For any additional questions about your care, contact your healthcare provider or state or local health department.    03/22/2022    Content source: National Center for Immunization and Respiratory Diseases (NCIRD), Division of Viral Diseases    This information is not intended to replace advice given to you by your health care provider. Make sure you discuss any questions you have with your health care provider.      Document Revised: 09/09/2022 Document Reviewed: 09/09/2022    Elsevier Patient Education © 2022 [employer]vier Inc.

## 2024-03-25 NOTE — ED ADULT NURSE NOTE - SUICIDE SCREENING QUESTION 2
[facility]  part of [facility] Infectious Disease  Progress Note    Gómez Sinclair Patient Status:  Inpatient    1982 MRN [id number]   Location [facility] 4W/SW/SE Attending Oj Domingo MD   Hosp Day # 4 PCP Austin Castro MD     Gómez Sinclair is a 42 year old male.   Chief Complaint   Patient presents with    Abdomen/Flank Pain       HPI:      Feels well; diet advancing               REVIEW OF SYSTEMS:   A comprehensive 10 point review of systems was completed.  Pertinent positives and negatives noted in the the HPI.       Allergies:  No Known Allergies     Current Meds:    Current Facility-Administered Medications:     ertapenem (Invanz) 1 g in sodium chloride 0.9% 100 mL IVPB-MBP, 1 g, Intravenous, Once    heparin (Porcine) 5000 UNIT/ML injection 5,000 Units, 5,000 Units, Subcutaneous, Q8H    hydrALAZINE (Apresoline) tab 25 mg, 25 mg, Oral, Q8H PRN    acetaminophen (Tylenol Extra Strength) tab 1,000 mg, 1,000 mg, Oral, Q6H PRN    traMADol (Ultram) tab 50 mg, 50 mg, Oral, Q6H PRN    [DISCONTINUED] morphINE PF 2 MG/ML injection 1 mg, 1 mg, Intravenous, Q2H PRN **OR** morphINE PF 2 MG/ML injection 2 mg, 2 mg, Intravenous, Q4H PRN **OR** [DISCONTINUED] morphINE PF 4 MG/ML injection 4 mg, 4 mg, Intravenous, Q2H PRN    baclofen (Lioresal) tab 5 mg, 5 mg, Oral, TID PRN    metoclopramide (Reglan) tab 10 mg, 10 mg, Oral, TID AC    lisinopril (Prinivil; Zestril) tab 40 mg, 40 mg, Oral, Daily    oxyCODONE immediate release tab 5 mg, 5 mg, Oral, Q4H PRN    ondansetron (Zofran) 4 MG/2ML injection 4 mg, 4 mg, Intravenous, Q6H PRN    polyethylene glycol (PEG 3350) (Miralax) 17 g oral packet 17 g, 17 g, Oral, Daily PRN    sennosides (Senokot) tab 17.2 mg, 17.2 mg, Oral, Nightly PRN    bisacodyl (Dulcolax) 10 MG rectal suppository 10 mg, 10 mg, Rectal, Daily PRN    amLODIPine (Norvasc) tab 10 mg, 10 mg, Oral, Daily   No current outpatient medications on file.         HISTORY:  Past Medical History:   Diagnosis Date    Essential hypertension     Hyperlipidemia       Past Surgical History:   Procedure Laterality Date    REPAIR ING HERNIA,5+Y/O,REDUCIBL          Social history and family history negative related to present illness except as above.    PHYSICAL EXAM:   /84 (BP Location: Left arm)   Pulse 83   Temp 97.8 °F (36.6 °C) (Oral)   Resp 16   Ht 5' 11\" (1.803 m)   Wt 200 lb (90.7 kg)   SpO2 100%   BMI 27.89 kg/m²   GENERAL:  Awake, alert, oriented x3. Non-tox, non-septic and in NAD.  HEENT:  Normocephalic, no scleral abnormalities.  Oropharynx clear, trachea ML.  NECK:  Supple, no masses, no lymphadenopathy.  LUNGS:  Clear to auscultation b/l, no rhonchi, rales, or wheezes.  CARDIO: RRR S1/S2, no rubs, clicks, heaves, or murmurs.  GI:  Soft mild distension and wounds look good  EXTREMITIES:  No edema, no clubbing, no cyanosis.  NEURO:  No focal neurologic deficits.  DERM:  Warm, dry, no rashes.    IMPRESSION/PLAN:        Peritonitis ruptured appendicitis   Wbc improved at 12k  Strep sp. On culture so far  Current rx zosyn ; give invanz today then home on augmentin  Spoke with pt., family; and dr gilliam; advancing diet and hiccups are better  >35 min   return asap if fever pain n/v drainage etc               Recent Results (from the past 72 hour(s))   CBC W/ DIFFERENTIAL    Collection Time: 03/22/24 12:19 PM   Result Value Ref Range    WBC 22.1 (H) 4.0 - 11.0 x10(3) uL    RBC 4.80 4.30 - 5.70 x10(6)uL    HGB 14.8 13.0 - 17.5 g/dL    HCT 40.9 39.0 - 53.0 %    MCV 85.2 80.0 - 100.0 fL    MCH 30.8 26.0 - 34.0 pg    MCHC 36.2 31.0 - 37.0 g/dL    RDW-SD 38.2 35.1 - 46.3 fL    RDW 12.3 11.0 - 15.0 %    .0 150.0 - 450.0 10(3)uL    Neutrophil Absolute Prelim 18.22 (H) 1.50 - 7.70 x10 (3) uL    Neutrophil Absolute 18.22 (H) 1.50 - 7.70 x10(3) uL    Lymphocyte Absolute 1.64 1.00 - 4.00 x10(3) uL    Monocyte Absolute 1.86 (H) 0.10 - 1.00 x10(3) uL    Eosinophil  Absolute 0.01 0.00 - 0.70 x10(3) uL    Basophil Absolute 0.10 0.00 - 0.20 x10(3) uL    Immature Granulocyte Absolute 0.30 0.00 - 1.00 x10(3) uL    Neutrophil % 82.3 %    Lymphocyte % 7.4 %    Monocyte % 8.4 %    Eosinophil % 0.0 %    Basophil % 0.5 %    Immature Granulocyte % 1.4 %   RAINBOW DRAW LIGHT GREEN    Collection Time: 03/22/24 12:19 PM   Result Value Ref Range    Hold Lt Green Auto Resulted    Basic Metabolic Panel (8)    Collection Time: 03/23/24  5:10 AM   Result Value Ref Range    Glucose 115 (H) 70 - 99 mg/dL    Sodium 133 (L) 136 - 145 mmol/L    Potassium 3.3 (L) 3.5 - 5.1 mmol/L    Chloride 103 98 - 112 mmol/L    CO2 26.0 21.0 - 32.0 mmol/L    Anion Gap 4 0 - 18 mmol/L    BUN 18 9 - 23 mg/dL    Creatinine 0.97 0.70 - 1.30 mg/dL    BUN/CREA Ratio 18.6 10.0 - 20.0    Calcium, Total 9.2 8.7 - 10.4 mg/dL    Calculated Osmolality 279 275 - 295 mOsm/kg    eGFR-Cr 99 >=60 mL/min/1.73m2   Magnesium    Collection Time: 03/23/24  5:10 AM   Result Value Ref Range    Magnesium 2.2 1.6 - 2.6 mg/dL   C-Reactive Protein    Collection Time: 03/23/24  5:10 AM   Result Value Ref Range    C-Reactive Protein 4.50 (H) <1.00 mg/dL   CBC W/ DIFFERENTIAL    Collection Time: 03/23/24  5:10 AM   Result Value Ref Range    WBC 18.8 (H) 4.0 - 11.0 x10(3) uL    RBC 4.54 4.30 - 5.70 x10(6)uL    HGB 13.6 13.0 - 17.5 g/dL    HCT 39.0 39.0 - 53.0 %    MCV 85.9 80.0 - 100.0 fL    MCH 30.0 26.0 - 34.0 pg    MCHC 34.9 31.0 - 37.0 g/dL    RDW-SD 39.3 35.1 - 46.3 fL    RDW 12.6 11.0 - 15.0 %    .0 150.0 - 450.0 10(3)uL    Neutrophil Absolute Prelim 14.88 (H) 1.50 - 7.70 x10 (3) uL    Neutrophil Absolute 14.88 (H) 1.50 - 7.70 x10(3) uL    Lymphocyte Absolute 1.72 1.00 - 4.00 x10(3) uL    Monocyte Absolute 1.76 (H) 0.10 - 1.00 x10(3) uL    Eosinophil Absolute 0.01 0.00 - 0.70 x10(3) uL    Basophil Absolute 0.08 0.00 - 0.20 x10(3) uL    Immature Granulocyte Absolute 0.30 0.00 - 1.00 x10(3) uL    Neutrophil % 79.3 %    Lymphocyte % 9.2  %    Monocyte % 9.4 %    Eosinophil % 0.1 %    Basophil % 0.4 %    Immature Granulocyte % 1.6 %   Potassium    Collection Time: 03/24/24  5:57 AM   Result Value Ref Range    Potassium 3.5 3.5 - 5.1 mmol/L   Basic Metabolic Panel (8)    Collection Time: 03/24/24  5:57 AM   Result Value Ref Range    Glucose 116 (H) 70 - 99 mg/dL    Sodium 135 (L) 136 - 145 mmol/L    Potassium 3.5 3.5 - 5.1 mmol/L    Chloride 104 98 - 112 mmol/L    CO2 23.0 21.0 - 32.0 mmol/L    Anion Gap 8 0 - 18 mmol/L    BUN 15 9 - 23 mg/dL    Creatinine 0.82 0.70 - 1.30 mg/dL    BUN/CREA Ratio 18.3 10.0 - 20.0    Calcium, Total 9.0 8.7 - 10.4 mg/dL    Calculated Osmolality 282 275 - 295 mOsm/kg    eGFR-Cr 112 >=60 mL/min/1.73m2   Magnesium    Collection Time: 03/24/24  5:57 AM   Result Value Ref Range    Magnesium 2.2 1.6 - 2.6 mg/dL   CBC W/ DIFFERENTIAL    Collection Time: 03/24/24  5:57 AM   Result Value Ref Range    WBC 16.2 (H) 4.0 - 11.0 x10(3) uL    RBC 4.09 (L) 4.30 - 5.70 x10(6)uL    HGB 12.4 (L) 13.0 - 17.5 g/dL    HCT 34.7 (L) 39.0 - 53.0 %    MCV 84.8 80.0 - 100.0 fL    MCH 30.3 26.0 - 34.0 pg    MCHC 35.7 31.0 - 37.0 g/dL    RDW-SD 38.3 35.1 - 46.3 fL    RDW 12.5 11.0 - 15.0 %    .0 150.0 - 450.0 10(3)uL    Neutrophil Absolute Prelim 11.51 (H) 1.50 - 7.70 x10 (3) uL    Neutrophil Absolute 11.51 (H) 1.50 - 7.70 x10(3) uL    Lymphocyte Absolute 2.33 1.00 - 4.00 x10(3) uL    Monocyte Absolute 1.71 (H) 0.10 - 1.00 x10(3) uL    Eosinophil Absolute 0.07 0.00 - 0.70 x10(3) uL    Basophil Absolute 0.12 0.00 - 0.20 x10(3) uL    Immature Granulocyte Absolute 0.46 0.00 - 1.00 x10(3) uL    Neutrophil % 71.1 %    Lymphocyte % 14.4 %    Monocyte % 10.6 %    Eosinophil % 0.4 %    Basophil % 0.7 %    Immature Granulocyte % 2.8 %   Scan slide    Collection Time: 03/24/24  5:57 AM   Result Value Ref Range    Slide Review       Slide reviewed, normal WBC, RBC, and platelet morphology.   Basic Metabolic Panel (8)    Collection Time: 03/25/24  6:10  AM   Result Value Ref Range    Glucose 98 70 - 99 mg/dL    Sodium 139 136 - 145 mmol/L    Potassium 3.9 3.5 - 5.1 mmol/L    Chloride 108 98 - 112 mmol/L    CO2 22.0 21.0 - 32.0 mmol/L    Anion Gap 9 0 - 18 mmol/L    BUN 13 9 - 23 mg/dL    Creatinine 0.97 0.70 - 1.30 mg/dL    BUN/CREA Ratio 13.4 10.0 - 20.0    Calcium, Total 8.6 (L) 8.7 - 10.4 mg/dL    Calculated Osmolality 288 275 - 295 mOsm/kg    eGFR-Cr 99 >=60 mL/min/1.73m2   Magnesium    Collection Time: 03/25/24  6:10 AM   Result Value Ref Range    Magnesium 2.2 1.6 - 2.6 mg/dL   CBC W/ DIFFERENTIAL    Collection Time: 03/25/24  6:11 AM   Result Value Ref Range    WBC 12.1 (H) 4.0 - 11.0 x10(3) uL    RBC 3.81 (L) 4.30 - 5.70 x10(6)uL    HGB 11.7 (L) 13.0 - 17.5 g/dL    HCT 33.8 (L) 39.0 - 53.0 %    MCV 88.7 80.0 - 100.0 fL    MCH 30.7 26.0 - 34.0 pg    MCHC 34.6 31.0 - 37.0 g/dL    RDW-SD 40.8 35.1 - 46.3 fL    RDW 12.5 11.0 - 15.0 %    .0 150.0 - 450.0 10(3)uL    Neutrophil Absolute Prelim 7.23 1.50 - 7.70 x10 (3) uL    Neutrophil Absolute 7.23 1.50 - 7.70 x10(3) uL    Lymphocyte Absolute 2.73 1.00 - 4.00 x10(3) uL    Monocyte Absolute 1.29 (H) 0.10 - 1.00 x10(3) uL    Eosinophil Absolute 0.32 0.00 - 0.70 x10(3) uL    Basophil Absolute 0.06 0.00 - 0.20 x10(3) uL    Immature Granulocyte Absolute 0.45 0.00 - 1.00 x10(3) uL    Neutrophil % 59.9 %    Lymphocyte % 22.6 %    Monocyte % 10.7 %    Eosinophil % 2.6 %    Basophil % 0.5 %    Immature Granulocyte % 3.7 %   Scan slide    Collection Time: 03/25/24  6:11 AM   Result Value Ref Range    Slide Review       Slide reviewed, normal WBC, RBC, and platelet morphology.         Lul Justice MD     CALL [facility] AT [phone] IF QUESTIONS OR CONCERNS  THANKS   No

## 2024-05-21 ENCOUNTER — APPOINTMENT (OUTPATIENT)
Dept: INTERNAL MEDICINE | Facility: CLINIC | Age: 69
End: 2024-05-21
Payer: MEDICARE

## 2024-05-21 VITALS
WEIGHT: 108 LBS | BODY MASS INDEX: 20.39 KG/M2 | SYSTOLIC BLOOD PRESSURE: 150 MMHG | HEIGHT: 61 IN | TEMPERATURE: 97.8 F | RESPIRATION RATE: 16 BRPM | OXYGEN SATURATION: 96 % | DIASTOLIC BLOOD PRESSURE: 88 MMHG | HEART RATE: 97 BPM

## 2024-05-21 DIAGNOSIS — H66.90 OTITIS MEDIA, UNSPECIFIED, UNSPECIFIED EAR: ICD-10-CM

## 2024-05-21 PROCEDURE — 99213 OFFICE O/P EST LOW 20 MIN: CPT

## 2024-05-21 RX ORDER — POTASSIUM CHLORIDE 750 MG/1
10 TABLET, FILM COATED, EXTENDED RELEASE ORAL
Qty: 16 | Refills: 0 | Status: DISCONTINUED | COMMUNITY
Start: 2023-01-13 | End: 2024-05-21

## 2024-05-21 RX ORDER — PREDNISONE 20 MG/1
20 TABLET ORAL TWICE DAILY
Qty: 10 | Refills: 0 | Status: ACTIVE | COMMUNITY
Start: 2024-05-21 | End: 1900-01-01

## 2024-05-21 RX ORDER — BROMPHENIRAMINE MALEATE, PSEUDOEPHEDRINE HYDROCHLORIDE, 2; 30; 10 MG/5ML; MG/5ML; MG/5ML
30-2-10 SYRUP ORAL
Qty: 280 | Refills: 0 | Status: DISCONTINUED | COMMUNITY
Start: 2023-01-06 | End: 2024-05-21

## 2024-05-21 RX ORDER — CIPROFLOXACIN AND DEXAMETHASONE 3; 1 MG/ML; MG/ML
0.3-0.1 SUSPENSION/ DROPS AURICULAR (OTIC) TWICE DAILY
Qty: 1 | Refills: 0 | Status: ACTIVE | COMMUNITY
Start: 2024-05-21 | End: 1900-01-01

## 2024-05-21 NOTE — PHYSICAL EXAM
[No Acute Distress] : no acute distress [No Lymphadenopathy] : no lymphadenopathy [Supple] : supple [No Respiratory Distress] : no respiratory distress  [Clear to Auscultation] : lungs were clear to auscultation bilaterally [Normal Rate] : normal rate  [Normal S1, S2] : normal S1 and S2 [No Edema] : there was no peripheral edema [Soft] : abdomen soft [Non Tender] : non-tender [Non-distended] : non-distended [Normal Bowel Sounds] : normal bowel sounds [de-identified] : R tm not visualized due to cerumen, L tm appears congested with clear fluid [de-identified] : Irregularly regular rhythm

## 2024-05-21 NOTE — HISTORY OF PRESENT ILLNESS
[FreeTextEntry8] : The patient is a 68 -year -old female with PMH of GERD, HTN, hypothyroidism, insomnia, anxiety presents to office today for evaluation of ear discomfort x several weeks. The patient denies recent illness. Patient reports + seasonal allergies, for which she uses flonase and Zyrtec daily for. Patient unable to use benadryl or OTC decongestants. Pt reports she feels "dripping" in ear, has crackling. [facility]

## 2024-05-21 NOTE — PLAN
[FreeTextEntry1] : 1. Patient to trial prednisone 20mg bid x5 days  2. Ciprodex to affected ear  3. If no improvement, patient will require ENT evaluation

## 2024-05-30 ENCOUNTER — APPOINTMENT (OUTPATIENT)
Dept: OBGYN | Facility: CLINIC | Age: 69
End: 2024-05-30
Payer: MEDICARE

## 2024-05-30 VITALS
HEIGHT: 61 IN | WEIGHT: 112 LBS | DIASTOLIC BLOOD PRESSURE: 68 MMHG | SYSTOLIC BLOOD PRESSURE: 128 MMHG | BODY MASS INDEX: 21.14 KG/M2

## 2024-05-30 DIAGNOSIS — Z01.419 ENCOUNTER FOR GYNECOLOGICAL EXAMINATION (GENERAL) (ROUTINE) W/OUT ABNORMAL FINDINGS: ICD-10-CM

## 2024-05-30 PROCEDURE — G0101: CPT

## 2024-05-30 NOTE — PLAN
[FreeTextEntry1] : 70 YO FEMALE PRESENTS FOR ANNUAL GYN EXAMINATION. SELF BREAST EXAMINATION TAUGHT. MAMMOGRAM + SONO ORDERED. EXERCISE, CALCIUM AND VITAMIN D3 SUPPLEMENTATION DISCUSSED. BONE DENSITY STUDY AND INDICATIONS REVIEWED. COLONOSCOPY HISTORY REVIEWED AND PT IS GOING TO SEE GI IN A FEW WEEKS. F/U 1 YR OR PRN. ENCOUNTER, ASSESSMENT AND PLAN REVIEWED. TG

## 2024-05-30 NOTE — PHYSICAL EXAM
[Chaperone Declined] : Patient declined chaperone [Appropriately responsive] : appropriately responsive [Alert] : alert [No Acute Distress] : no acute distress [Soft] : soft [Non-tender] : non-tender [Non-distended] : non-distended [No HSM] : No HSM [No Lesions] : no lesions [No Mass] : no mass [Oriented x3] : oriented x3 [Examination Of The Breasts] : a normal appearance [No Masses] : no breast masses were palpable [Labia Majora] : normal [Labia Minora] : normal [Normal] : normal [Uterine Adnexae] : normal [Declined] : Patient declined rectal exam

## 2024-05-30 NOTE — HISTORY OF PRESENT ILLNESS
[FreeTextEntry1] : 70 YO F PRESENTS FOR ANNUAL VISIT. PT FEELS WELL AND OFFERS NO COMPLAINTS.  SIGNIFICANT FAM HX OF CANCERS: BREAST CA, STOMACH AND RECTAL CANCER  LPAP 5/25/24: NORMAL LMAMMO 6/1/22: BIRADS-2. DENSE BREASTS. PT STATES SHE HAD ANOTHER MAMMO + SONO DONE LAST YEAR WHICH WAS WNL PER HER REPORT. LDEXA- BELIEVES WAS LAST YEAR. WILL OBTAIN REPORT.

## 2024-06-03 LAB — HPV HIGH+LOW RISK DNA PNL CVX: NOT DETECTED

## 2024-06-04 LAB — CYTOLOGY CVX/VAG DOC THIN PREP: ABNORMAL

## 2024-06-07 ENCOUNTER — RX RENEWAL (OUTPATIENT)
Age: 69
End: 2024-06-07

## 2024-06-07 RX ORDER — LOSARTAN POTASSIUM AND HYDROCHLOROTHIAZIDE 25; 100 MG/1; MG/1
100-25 TABLET ORAL
Qty: 90 | Refills: 1 | Status: ACTIVE | COMMUNITY
Start: 2018-09-14 | End: 1900-01-01

## 2024-06-28 ENCOUNTER — NON-APPOINTMENT (OUTPATIENT)
Age: 69
End: 2024-06-28

## 2024-06-28 DIAGNOSIS — Z20.5 CONTACT WITH AND (SUSPECTED) EXPOSURE TO VIRAL HEPATITIS: ICD-10-CM
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
## 2024-11-14 ENCOUNTER — APPOINTMENT (OUTPATIENT)
Dept: INTERNAL MEDICINE | Facility: CLINIC | Age: 69
End: 2024-11-14
Payer: MEDICARE

## 2024-11-14 VITALS
HEIGHT: 61 IN | SYSTOLIC BLOOD PRESSURE: 134 MMHG | TEMPERATURE: 98.5 F | BODY MASS INDEX: 21.52 KG/M2 | HEART RATE: 86 BPM | OXYGEN SATURATION: 96 % | RESPIRATION RATE: 15 BRPM | WEIGHT: 114 LBS | DIASTOLIC BLOOD PRESSURE: 80 MMHG

## 2024-11-14 DIAGNOSIS — E78.5 HYPERLIPIDEMIA, UNSPECIFIED: ICD-10-CM

## 2024-11-14 DIAGNOSIS — Z00.00 ENCOUNTER FOR GENERAL ADULT MEDICAL EXAMINATION W/OUT ABNORMAL FINDINGS: ICD-10-CM

## 2024-11-14 DIAGNOSIS — M48.00 SPINAL STENOSIS, SITE UNSPECIFIED: ICD-10-CM

## 2024-11-14 PROCEDURE — G0439: CPT

## 2024-11-15 ENCOUNTER — NON-APPOINTMENT (OUTPATIENT)
Age: 69
End: 2024-11-15

## 2024-11-15 LAB
25(OH)D3 SERPL-MCNC: 47.2 NG/ML
ALBUMIN SERPL ELPH-MCNC: 4.5 G/DL
ALP BLD-CCNC: 45 U/L
ALT SERPL-CCNC: 17 U/L
ANION GAP SERPL CALC-SCNC: 13 MMOL/L
AST SERPL-CCNC: 14 U/L
BILIRUB SERPL-MCNC: 0.3 MG/DL
BUN SERPL-MCNC: 15 MG/DL
CALCIUM SERPL-MCNC: 9.6 MG/DL
CHLORIDE SERPL-SCNC: 95 MMOL/L
CHOLEST SERPL-MCNC: 223 MG/DL
CO2 SERPL-SCNC: 27 MMOL/L
CREAT SERPL-MCNC: 0.68 MG/DL
EGFR: 94 ML/MIN/1.73M2
GLUCOSE SERPL-MCNC: 86 MG/DL
HCT VFR BLD CALC: 39.7 %
HDLC SERPL-MCNC: 80 MG/DL
HGB BLD-MCNC: 13.2 G/DL
LDLC SERPL CALC-MCNC: 128 MG/DL
MCHC RBC-ENTMCNC: 29.1 PG
MCHC RBC-ENTMCNC: 33.2 G/DL
MCV RBC AUTO: 87.4 FL
NONHDLC SERPL-MCNC: 144 MG/DL
PLATELET # BLD AUTO: 302 K/UL
POTASSIUM SERPL-SCNC: 4.1 MMOL/L
PROT SERPL-MCNC: 6.6 G/DL
RBC # BLD: 4.54 M/UL
RBC # FLD: 14.4 %
SODIUM SERPL-SCNC: 136 MMOL/L
T3FREE SERPL-MCNC: 2.81 PG/ML
T4 FREE SERPL-MCNC: 1.8 NG/DL
TRIGL SERPL-MCNC: 92 MG/DL
TSH SERPL-ACNC: 1.8 UIU/ML
WBC # FLD AUTO: 6.48 K/UL

## 2024-11-18 ENCOUNTER — NON-APPOINTMENT (OUTPATIENT)
Age: 69
End: 2024-11-18

## 2024-11-18 LAB
APPEARANCE: CLEAR
BACTERIA: NEGATIVE /HPF
BILIRUBIN URINE: NEGATIVE
BLOOD URINE: NEGATIVE
CAST: 1 /LPF
COLOR: YELLOW
EPITHELIAL CELLS: 1 /HPF
ESTIMATED AVERAGE GLUCOSE: 108 MG/DL
GLUCOSE QUALITATIVE U: NEGATIVE MG/DL
HBA1C MFR BLD HPLC: 5.4 %
HCV AB SER QL: NONREACTIVE
HCV S/CO RATIO: 0.07 S/CO
KETONES URINE: NEGATIVE MG/DL
LEUKOCYTE ESTERASE URINE: NEGATIVE
MICROSCOPIC-UA: NORMAL
NITRITE URINE: NEGATIVE
PH URINE: 7.5
PROTEIN URINE: NEGATIVE MG/DL
RED BLOOD CELLS URINE: NORMAL /HPF
REVIEW: NORMAL
SPECIFIC GRAVITY URINE: 1.01
UROBILINOGEN URINE: 0.2 MG/DL
WHITE BLOOD CELLS URINE: 1 /HPF

## 2024-12-30 ENCOUNTER — EMERGENCY (EMERGENCY)
Facility: HOSPITAL | Age: 69
LOS: 0 days | Discharge: HOME | End: 2024-12-30
Attending: EMERGENCY MEDICINE
Payer: MEDICARE

## 2024-12-30 VITALS
TEMPERATURE: 98 F | RESPIRATION RATE: 18 BRPM | DIASTOLIC BLOOD PRESSURE: 67 MMHG | SYSTOLIC BLOOD PRESSURE: 150 MMHG | HEART RATE: 98 BPM | OXYGEN SATURATION: 100 %

## 2024-12-30 VITALS
DIASTOLIC BLOOD PRESSURE: 74 MMHG | OXYGEN SATURATION: 100 % | SYSTOLIC BLOOD PRESSURE: 159 MMHG | TEMPERATURE: 98 F | RESPIRATION RATE: 17 BRPM | HEART RATE: 95 BPM

## 2024-12-30 LAB
ALBUMIN SERPL ELPH-MCNC: 3.8 G/DL — SIGNIFICANT CHANGE UP (ref 3.3–5)
ALP SERPL-CCNC: 38 U/L — LOW (ref 40–120)
ALT FLD-CCNC: 23 U/L — SIGNIFICANT CHANGE UP (ref 12–78)
ANION GAP SERPL CALC-SCNC: 7 MMOL/L — SIGNIFICANT CHANGE UP (ref 5–17)
AST SERPL-CCNC: 21 U/L — SIGNIFICANT CHANGE UP (ref 15–37)
BASOPHILS # BLD AUTO: 0.02 K/UL — SIGNIFICANT CHANGE UP (ref 0–0.2)
BASOPHILS NFR BLD AUTO: 0.2 % — SIGNIFICANT CHANGE UP (ref 0–2)
BILIRUB SERPL-MCNC: 0.4 MG/DL — SIGNIFICANT CHANGE UP (ref 0.2–1.2)
BUN SERPL-MCNC: 8 MG/DL — SIGNIFICANT CHANGE UP (ref 7–23)
CALCIUM SERPL-MCNC: 8.8 MG/DL — SIGNIFICANT CHANGE UP (ref 8.5–10.1)
CHLORIDE SERPL-SCNC: 90 MMOL/L — LOW (ref 96–108)
CO2 SERPL-SCNC: 27 MMOL/L — SIGNIFICANT CHANGE UP (ref 22–31)
CREAT SERPL-MCNC: 0.61 MG/DL — SIGNIFICANT CHANGE UP (ref 0.5–1.3)
EGFR: 96 ML/MIN/1.73M2 — SIGNIFICANT CHANGE UP
EOSINOPHIL # BLD AUTO: 0.01 K/UL — SIGNIFICANT CHANGE UP (ref 0–0.5)
EOSINOPHIL NFR BLD AUTO: 0.1 % — SIGNIFICANT CHANGE UP (ref 0–6)
GLUCOSE SERPL-MCNC: 121 MG/DL — HIGH (ref 70–99)
HCT VFR BLD CALC: 37.4 % — SIGNIFICANT CHANGE UP (ref 34.5–45)
HGB BLD-MCNC: 12.9 G/DL — SIGNIFICANT CHANGE UP (ref 11.5–15.5)
IMM GRANULOCYTES NFR BLD AUTO: 0.3 % — SIGNIFICANT CHANGE UP (ref 0–0.9)
LACTATE SERPL-SCNC: 0.9 MMOL/L — SIGNIFICANT CHANGE UP (ref 0.7–2)
LIDOCAIN IGE QN: 20 U/L — SIGNIFICANT CHANGE UP (ref 13–75)
LYMPHOCYTES # BLD AUTO: 0.58 K/UL — LOW (ref 1–3.3)
LYMPHOCYTES # BLD AUTO: 5.7 % — LOW (ref 13–44)
MAGNESIUM SERPL-MCNC: 1.6 MG/DL — SIGNIFICANT CHANGE UP (ref 1.6–2.6)
MCHC RBC-ENTMCNC: 29 PG — SIGNIFICANT CHANGE UP (ref 27–34)
MCHC RBC-ENTMCNC: 34.5 G/DL — SIGNIFICANT CHANGE UP (ref 32–36)
MCV RBC AUTO: 84 FL — SIGNIFICANT CHANGE UP (ref 80–100)
MONOCYTES # BLD AUTO: 0.32 K/UL — SIGNIFICANT CHANGE UP (ref 0–0.9)
MONOCYTES NFR BLD AUTO: 3.1 % — SIGNIFICANT CHANGE UP (ref 2–14)
NEUTROPHILS # BLD AUTO: 9.29 K/UL — HIGH (ref 1.8–7.4)
NEUTROPHILS NFR BLD AUTO: 90.6 % — HIGH (ref 43–77)
PLATELET # BLD AUTO: 305 K/UL — SIGNIFICANT CHANGE UP (ref 150–400)
POTASSIUM SERPL-MCNC: 3 MMOL/L — LOW (ref 3.5–5.3)
POTASSIUM SERPL-SCNC: 3 MMOL/L — LOW (ref 3.5–5.3)
PROT SERPL-MCNC: 6.8 GM/DL — SIGNIFICANT CHANGE UP (ref 6–8.3)
RBC # BLD: 4.45 M/UL — SIGNIFICANT CHANGE UP (ref 3.8–5.2)
RBC # FLD: 12.9 % — SIGNIFICANT CHANGE UP (ref 10.3–14.5)
SODIUM SERPL-SCNC: 124 MMOL/L — LOW (ref 135–145)
TROPONIN I, HIGH SENSITIVITY RESULT: 6.92 NG/L — SIGNIFICANT CHANGE UP
WBC # BLD: 10.25 K/UL — SIGNIFICANT CHANGE UP (ref 3.8–10.5)
WBC # FLD AUTO: 10.25 K/UL — SIGNIFICANT CHANGE UP (ref 3.8–10.5)

## 2024-12-30 PROCEDURE — 74177 CT ABD & PELVIS W/CONTRAST: CPT | Mod: 26,MC

## 2024-12-30 PROCEDURE — 99285 EMERGENCY DEPT VISIT HI MDM: CPT | Mod: FS

## 2024-12-30 RX ORDER — SODIUM CHLORIDE 9 MG/ML
1000 INJECTION, SOLUTION INTRAMUSCULAR; INTRAVENOUS; SUBCUTANEOUS ONCE
Refills: 0 | Status: COMPLETED | OUTPATIENT
Start: 2024-12-30 | End: 2024-12-30

## 2024-12-30 RX ORDER — POTASSIUM CHLORIDE 600 MG/1
40 TABLET, EXTENDED RELEASE ORAL ONCE
Refills: 0 | Status: COMPLETED | OUTPATIENT
Start: 2024-12-30 | End: 2024-12-30

## 2024-12-30 RX ORDER — ONDANSETRON HYDROCHLORIDE 4 MG/1
4 TABLET, FILM COATED ORAL ONCE
Refills: 0 | Status: COMPLETED | OUTPATIENT
Start: 2024-12-30 | End: 2024-12-30

## 2024-12-30 RX ORDER — ONDANSETRON HYDROCHLORIDE 4 MG/1
1 TABLET, FILM COATED ORAL
Qty: 10 | Refills: 0
Start: 2024-12-30

## 2024-12-30 RX ADMIN — SODIUM CHLORIDE 1000 MILLILITER(S): 9 INJECTION, SOLUTION INTRAMUSCULAR; INTRAVENOUS; SUBCUTANEOUS at 21:58

## 2024-12-30 RX ADMIN — POTASSIUM CHLORIDE 40 MILLIEQUIVALENT(S): 600 TABLET, EXTENDED RELEASE ORAL at 21:58

## 2024-12-30 RX ADMIN — ONDANSETRON HYDROCHLORIDE 4 MILLIGRAM(S): 4 TABLET, FILM COATED ORAL at 20:22

## 2024-12-30 RX ADMIN — SODIUM CHLORIDE 1000 MILLILITER(S): 9 INJECTION, SOLUTION INTRAMUSCULAR; INTRAVENOUS; SUBCUTANEOUS at 20:23

## 2024-12-30 NOTE — ED ADULT NURSE NOTE - NSFALLUNIVINTERV_ED_ALL_ED
Pt CardioMems transmission received and review.      CardioMEMS Transmission  2/14/2022 2/10/2022 1/31/2022 1/27/2022   Transmission Date 2/12/2022 2/8/2022 1/30/2022 1/27/2022   Transmission Type Maintenance Maintenance Maintenance Maintenance   PAS 30 39 44 32   LEELA 18 25 29 20   PAD  11 16 19 12   Medication Adjustments  No No No No   Some recent data might be hidden         Pressures are stable.    Medications changed? no    Patient contacted? no    Will continue to monitor.   
Bed/Stretcher in lowest position, wheels locked, appropriate side rails in place/Call bell, personal items and telephone in reach/Instruct patient to call for assistance before getting out of bed/chair/stretcher/Non-slip footwear applied when patient is off stretcher/[place] to call system/Physically safe environment - no spills, clutter or unnecessary equipment/Purposeful proactive rounding/Room/bathroom lighting operational, light cord in reach

## 2024-12-30 NOTE — ED STATDOCS - PROGRESS NOTE DETAILS
signed Aditi Roberson PA-C Pt seen initially in intake by Dr. Odonnell.   69F c/o diarrhea which started 3 days ago and now she also has a lot of vomiting and some abd pain. No fever, no known sick contacts. Pt says she usually gets this type of illness about once a year. Also notes her son is living at home with her now and he is a recovering addict and it is very stressful for her so she hasn't been sleeping well at all. Pt alert, appears uncomfortable. Plan labs, fluids, and CT. Pt agrees with plan of  care. signed Aditi Roberson PA-C  Labs with Na 124 K 3. pt says this usually happens to her when she gets like this. Pt feeling better after fluids and meds. CT with some thickening v underdistention of colon. Favor underdistention. Pt tolerates PO in ED, would like to DC home. rx zofran. f/u PMD this week to repeat labs. return precautions given. Pt feeling well at DC, agrees with DC and plan of care. signed Aditi Roberson PA-C Pt seen initially in intake by Dr. Odonnell.   69F c/o diarrhea which started 3 days ago and now she also has a lot of vomiting and some abd pain. No fever, no known sick contacts. Pt says she usually gets this type of illness about once a year. Also notes her son is living at home with her now and it is very stressful for her so she hasn't been sleeping well at all. Pt alert, appears uncomfortable. Plan labs, fluids, and CT. Pt agrees with plan of  care.

## 2024-12-30 NOTE — ED ADULT NURSE NOTE - OBJECTIVE STATEMENT
PT presents to the ED with complaints of vomiting and diarrhea for the past 3 days with lower/mid abd pain. denies fevers, chills.

## 2024-12-30 NOTE — ED STATDOCS - PATIENT PORTAL LINK FT
You can access the FollowMyHealth Patient Portal offered by [facility] by registering at the following website: [email or web address]. By joining [email or web address]’s FollowMyHealth portal, you will also be able to view your health information using other applications (apps) compatible with our system.

## 2024-12-30 NOTE — ED ADULT TRIAGE NOTE - CHIEF COMPLAINT QUOTE
PT presents to er with complaints of vomiting and diarrhea for the past 3 days.with lower/mid abd pain

## 2024-12-30 NOTE — ED STATDOCS - PHYSICAL EXAMINATION
Gen:  Well appearing in NAD  Head:  NC/AT  HEENT: pupils perrl,no pharyngeal erythema, uvula midline  Cardiac: S1S2, RRR  Abd: Soft, non tender  Resp: No distress, CTA   musculoskeletal:: no deformities, no swelling, strength +5/+5  Skin: warm and dry as visualized, no rashes  Neuro: STEVENS, aao x 4  Psych:alert, cooperative, appropriate mood and affect for situation

## 2024-12-30 NOTE — ED STATDOCS - OBJECTIVE STATEMENT
69 year old with PMHx HTN, hypothyroid, anxiety presents to the ed c/o nausea, vomiting, diarrhea. Cant sleep due to worries of son who is an addict using drugs actively and may have gone to the hospital tonight.

## 2024-12-30 NOTE — ED STATDOCS - NSFOLLOWUPINSTRUCTIONS_ED_ALL_ED_FT
FOLLOW UP WITH YOUR DOCTOR THIS WEEK TO RECHECK YOUR ELECTROLYTES. CALL THE OFFICE TO MAKE AN APPOINTMENT. RETURN TO ER FOR ANY WORSENING SYMPTOMS OR NEW CONCERNS.     Viral Gastroenteritis, Adult     Viral gastroenteritis is also known as the stomach flu. This condition is caused by various viruses. These viruses can be passed from person to person very easily (are very contagious). This condition may affect your stomach, small intestine, and large intestine. It can cause sudden watery diarrhea, fever, and vomiting.  Diarrhea and vomiting can make you feel weak and cause you to become dehydrated. You may not be able to keep fluids down. Dehydration can make you tired and thirsty, cause you to have a dry mouth, and decrease how often you urinate. Older adults and people with other diseases or a weak immune system are at higher risk for dehydration.  It is important to replace the fluids that you lose from diarrhea and vomiting. If you become severely dehydrated, you may need to get fluids through an IV tube.  What are the causes?  Gastroenteritis is caused by various viruses, including rotavirus and norovirus. Norovirus is the most common cause in adults.  You can get sick by eating food, drinking water, or touching a surface contaminated with one of these viruses. You can also get sick from sharing utensils or other personal items with an infected person.  What increases the risk?  This condition is more likely to develop in people:  Who have a weak defense system (immune system).Who live with one or more children who are younger than 2 years old.Who live in a nursing home.Who go on cruise ships.What are the signs or symptoms?  Symptoms of this condition start suddenly 1–2 days after exposure to a virus. Symptoms may last a few days or as long as a week. The most common symptoms are watery diarrhea and vomiting. Other symptoms include:  Fever.Headache.Fatigue.Pain in the abdomen.Chills.Weakness.Nausea.Muscle aches.Loss of appetite.How is this diagnosed?  This condition is diagnosed with a medical history and physical exam. You may also have a stool test to check for viruses or other infections.  How is this treated?  This condition typically goes away on its own. The focus of treatment is to restore lost fluids (rehydration). Your health care provider may recommend that you take an oral rehydration solution (ORS) to replace important salts and minerals (electrolytes) in your body. Severe cases of this condition may require giving fluids through an IV tube.  Treatment may also include medicine to help with your symptoms.  Follow these instructions at home:     Follow instructions from your health care provider about how to care for yourself at home.  Follow these recommendations as told by your health care provider:  Take an ORS. This is a drink that is sold at pharmacies and retail stores.Drink clear fluids in small amounts as you are able. Clear fluids include water, ice chips, diluted fruit juice, and low-calorie sports drinks.Eat bland, easy-to-digest foods in small amounts as you are able. These foods include bananas, applesauce, rice, lean meats, toast, and crackers.Avoid fluids that contain a lot of sugar or caffeine, such as energy drinks, sports drinks, and soda.Avoid alcohol.Avoid spicy or fatty foods.General instructions    Drink enough fluid to keep your urine clear or pale yellow.Wash your hands often. If soap and water are not available, use hand .Make sure that all people in your household wash their hands well and often.Take over-the-counter and prescription medicines only as told by your health care provider.Rest at home while you recover.Watch your condition for any changes.Take a warm bath to relieve any burning or pain from frequent diarrhea episodes.Keep all follow-up visits as told by your health care provider. This is important.Contact a health care provider if:  You cannot keep fluids down.Your symptoms get worse.You have new symptoms.You feel light-headed or dizzy.You have muscle cramps.Get help right away if:  You have chest pain.You feel extremely weak or you faint.You see blood in your vomit.Your vomit looks like coffee grounds.You have bloody or black stools or stools that look like tar.You have a severe headache, a stiff neck, or both.You have a rash.You have severe pain, cramping, or bloating in your abdomen.You have trouble breathing or you are breathing very quickly.Your heart is beating very quickly.Your skin feels cold and clammy.You feel confused.You have pain when you urinate.You have signs of dehydration, such as:  Dark urine, very little urine, or no urine.Cracked lips.Dry mouth.Sunken eyes.Sleepiness.Weakness.This information is not intended to replace advice given to you by your health care provider. Make sure you discuss any questions you have with your health care provider.

## 2025-01-01 ENCOUNTER — INPATIENT (INPATIENT)
Facility: HOSPITAL | Age: 70
LOS: 1 days | Discharge: HOME | DRG: 641 | End: 2025-01-03
Attending: INTERNAL MEDICINE | Admitting: INTERNAL MEDICINE
Payer: MEDICARE

## 2025-01-01 VITALS
OXYGEN SATURATION: 99 % | SYSTOLIC BLOOD PRESSURE: 151 MMHG | RESPIRATION RATE: 18 BRPM | HEART RATE: 95 BPM | DIASTOLIC BLOOD PRESSURE: 63 MMHG | TEMPERATURE: 98 F

## 2025-01-01 DIAGNOSIS — R11.2 NAUSEA WITH VOMITING, UNSPECIFIED: ICD-10-CM

## 2025-01-01 LAB
ADD ON TEST-SPECIMEN IN LAB: SIGNIFICANT CHANGE UP
ALBUMIN SERPL ELPH-MCNC: 4 G/DL — SIGNIFICANT CHANGE UP (ref 3.3–5)
ALP SERPL-CCNC: 40 U/L — SIGNIFICANT CHANGE UP (ref 40–120)
ALT FLD-CCNC: 28 U/L — SIGNIFICANT CHANGE UP (ref 12–78)
ANION GAP SERPL CALC-SCNC: 10 MMOL/L — SIGNIFICANT CHANGE UP (ref 5–17)
ANION GAP SERPL CALC-SCNC: 8 MMOL/L — SIGNIFICANT CHANGE UP (ref 5–17)
APPEARANCE UR: CLEAR — SIGNIFICANT CHANGE UP
AST SERPL-CCNC: 29 U/L — SIGNIFICANT CHANGE UP (ref 15–37)
BACTERIA # UR AUTO: NEGATIVE /HPF — SIGNIFICANT CHANGE UP
BASOPHILS # BLD AUTO: 0.01 K/UL — SIGNIFICANT CHANGE UP (ref 0–0.2)
BASOPHILS NFR BLD AUTO: 0.1 % — SIGNIFICANT CHANGE UP (ref 0–2)
BILIRUB SERPL-MCNC: 0.5 MG/DL — SIGNIFICANT CHANGE UP (ref 0.2–1.2)
BILIRUB UR-MCNC: NEGATIVE — SIGNIFICANT CHANGE UP
BUN SERPL-MCNC: 10 MG/DL — SIGNIFICANT CHANGE UP (ref 7–23)
BUN SERPL-MCNC: 8 MG/DL — SIGNIFICANT CHANGE UP (ref 7–23)
CALCIUM SERPL-MCNC: 8.6 MG/DL — SIGNIFICANT CHANGE UP (ref 8.5–10.1)
CALCIUM SERPL-MCNC: 9.1 MG/DL — SIGNIFICANT CHANGE UP (ref 8.5–10.1)
CAST: 0 /LPF — SIGNIFICANT CHANGE UP (ref 0–4)
CHLORIDE SERPL-SCNC: 87 MMOL/L — LOW (ref 96–108)
CHLORIDE SERPL-SCNC: 92 MMOL/L — LOW (ref 96–108)
CO2 SERPL-SCNC: 23 MMOL/L — SIGNIFICANT CHANGE UP (ref 22–31)
CO2 SERPL-SCNC: 25 MMOL/L — SIGNIFICANT CHANGE UP (ref 22–31)
COLOR SPEC: YELLOW — SIGNIFICANT CHANGE UP
CREAT SERPL-MCNC: 0.5 MG/DL — SIGNIFICANT CHANGE UP (ref 0.5–1.3)
CREAT SERPL-MCNC: 0.53 MG/DL — SIGNIFICANT CHANGE UP (ref 0.5–1.3)
DIFF PNL FLD: ABNORMAL
EGFR: 101 ML/MIN/1.73M2 — SIGNIFICANT CHANGE UP
EGFR: 99 ML/MIN/1.73M2 — SIGNIFICANT CHANGE UP
EOSINOPHIL # BLD AUTO: 0 K/UL — SIGNIFICANT CHANGE UP (ref 0–0.5)
EOSINOPHIL NFR BLD AUTO: 0 % — SIGNIFICANT CHANGE UP (ref 0–6)
FLUAV AG NPH QL: SIGNIFICANT CHANGE UP
FLUBV AG NPH QL: SIGNIFICANT CHANGE UP
GLUCOSE SERPL-MCNC: 88 MG/DL — SIGNIFICANT CHANGE UP (ref 70–99)
GLUCOSE SERPL-MCNC: 94 MG/DL — SIGNIFICANT CHANGE UP (ref 70–99)
GLUCOSE UR QL: NEGATIVE MG/DL — SIGNIFICANT CHANGE UP
HCT VFR BLD CALC: 40.7 % — SIGNIFICANT CHANGE UP (ref 34.5–45)
HGB BLD-MCNC: 14.2 G/DL — SIGNIFICANT CHANGE UP (ref 11.5–15.5)
IMM GRANULOCYTES NFR BLD AUTO: 0.2 % — SIGNIFICANT CHANGE UP (ref 0–0.9)
KETONES UR-MCNC: >=160 MG/DL
LACTATE SERPL-SCNC: 1 MMOL/L — SIGNIFICANT CHANGE UP (ref 0.7–2)
LEUKOCYTE ESTERASE UR-ACNC: ABNORMAL
LIDOCAIN IGE QN: 21 U/L — SIGNIFICANT CHANGE UP (ref 13–75)
LYMPHOCYTES # BLD AUTO: 0.45 K/UL — LOW (ref 1–3.3)
LYMPHOCYTES # BLD AUTO: 5.4 % — LOW (ref 13–44)
MAGNESIUM SERPL-MCNC: 1.8 MG/DL — SIGNIFICANT CHANGE UP (ref 1.6–2.6)
MCHC RBC-ENTMCNC: 29 PG — SIGNIFICANT CHANGE UP (ref 27–34)
MCHC RBC-ENTMCNC: 34.9 G/DL — SIGNIFICANT CHANGE UP (ref 32–36)
MCV RBC AUTO: 83.2 FL — SIGNIFICANT CHANGE UP (ref 80–100)
MONOCYTES # BLD AUTO: 0.46 K/UL — SIGNIFICANT CHANGE UP (ref 0–0.9)
MONOCYTES NFR BLD AUTO: 5.5 % — SIGNIFICANT CHANGE UP (ref 2–14)
NEUTROPHILS # BLD AUTO: 7.47 K/UL — HIGH (ref 1.8–7.4)
NEUTROPHILS NFR BLD AUTO: 88.8 % — HIGH (ref 43–77)
NITRITE UR-MCNC: NEGATIVE — SIGNIFICANT CHANGE UP
PH UR: 7 — SIGNIFICANT CHANGE UP (ref 5–8)
PLATELET # BLD AUTO: 315 K/UL — SIGNIFICANT CHANGE UP (ref 150–400)
POTASSIUM SERPL-MCNC: 2.6 MMOL/L — CRITICAL LOW (ref 3.5–5.3)
POTASSIUM SERPL-MCNC: 3.4 MMOL/L — LOW (ref 3.5–5.3)
POTASSIUM SERPL-SCNC: 2.6 MMOL/L — CRITICAL LOW (ref 3.5–5.3)
POTASSIUM SERPL-SCNC: 3.4 MMOL/L — LOW (ref 3.5–5.3)
PROT SERPL-MCNC: 7.1 GM/DL — SIGNIFICANT CHANGE UP (ref 6–8.3)
PROT UR-MCNC: SIGNIFICANT CHANGE UP MG/DL
RBC # BLD: 4.89 M/UL — SIGNIFICANT CHANGE UP (ref 3.8–5.2)
RBC # FLD: 12.6 % — SIGNIFICANT CHANGE UP (ref 10.3–14.5)
RBC CASTS # UR COMP ASSIST: 25 /HPF — HIGH (ref 0–4)
RSV RNA NPH QL NAA+NON-PROBE: SIGNIFICANT CHANGE UP
SARS-COV-2 RNA SPEC QL NAA+PROBE: SIGNIFICANT CHANGE UP
SODIUM SERPL-SCNC: 122 MMOL/L — LOW (ref 135–145)
SODIUM SERPL-SCNC: 123 MMOL/L — LOW (ref 135–145)
SP GR SPEC: 1.01 — SIGNIFICANT CHANGE UP (ref 1–1.03)
SQUAMOUS # UR AUTO: 4 /HPF — SIGNIFICANT CHANGE UP (ref 0–5)
UROBILINOGEN FLD QL: 0.2 MG/DL — SIGNIFICANT CHANGE UP (ref 0.2–1)
WBC # BLD: 8.41 K/UL — SIGNIFICANT CHANGE UP (ref 3.8–10.5)
WBC # FLD AUTO: 8.41 K/UL — SIGNIFICANT CHANGE UP (ref 3.8–10.5)
WBC UR QL: 8 /HPF — HIGH (ref 0–5)

## 2025-01-01 PROCEDURE — 85027 COMPLETE CBC AUTOMATED: CPT

## 2025-01-01 PROCEDURE — 83930 ASSAY OF BLOOD OSMOLALITY: CPT

## 2025-01-01 PROCEDURE — 93005 ELECTROCARDIOGRAM TRACING: CPT

## 2025-01-01 PROCEDURE — 87040 BLOOD CULTURE FOR BACTERIA: CPT

## 2025-01-01 PROCEDURE — 80048 BASIC METABOLIC PNL TOTAL CA: CPT

## 2025-01-01 PROCEDURE — 80053 COMPREHEN METABOLIC PANEL: CPT

## 2025-01-01 PROCEDURE — 36415 COLL VENOUS BLD VENIPUNCTURE: CPT

## 2025-01-01 PROCEDURE — 85025 COMPLETE CBC W/AUTO DIFF WBC: CPT

## 2025-01-01 PROCEDURE — 99223 1ST HOSP IP/OBS HIGH 75: CPT

## 2025-01-01 PROCEDURE — 99285 EMERGENCY DEPT VISIT HI MDM: CPT

## 2025-01-01 RX ORDER — MAGNESIUM SULFATE 500 MG/ML
1 INJECTION, SOLUTION INTRAMUSCULAR; INTRAVENOUS ONCE
Refills: 0 | Status: COMPLETED | OUTPATIENT
Start: 2025-01-01 | End: 2025-01-01

## 2025-01-01 RX ORDER — LOSARTAN POTASSIUM AND HYDROCHLOROTHIAZIDE 100; 25 MG/1; MG/1
1 TABLET, FILM COATED ORAL
Refills: 0 | DISCHARGE

## 2025-01-01 RX ORDER — LEVOTHYROXINE SODIUM 175 UG/1
1 TABLET ORAL
Refills: 0 | DISCHARGE

## 2025-01-01 RX ORDER — CYCLOSPORINE 0.5 MG/ML
0 EMULSION OPHTHALMIC
Refills: 0 | DISCHARGE

## 2025-01-01 RX ORDER — ONDANSETRON 4 MG/1
4 TABLET ORAL ONCE
Refills: 0 | Status: COMPLETED | OUTPATIENT
Start: 2025-01-01 | End: 2025-01-01

## 2025-01-01 RX ORDER — CIPROFLOXACIN HYDROCHLORIDE 500 MG/1
400 TABLET, FILM COATED ORAL EVERY 12 HOURS
Refills: 0 | Status: DISCONTINUED | OUTPATIENT
Start: 2025-01-01 | End: 2025-01-02

## 2025-01-01 RX ORDER — POTASSIUM CHLORIDE 600 MG/1
40 TABLET, FILM COATED, EXTENDED RELEASE ORAL ONCE
Refills: 0 | Status: COMPLETED | OUTPATIENT
Start: 2025-01-01 | End: 2025-01-01

## 2025-01-01 RX ORDER — LEVOTHYROXINE SODIUM 175 UG/1
75 TABLET ORAL DAILY
Refills: 0 | Status: DISCONTINUED | OUTPATIENT
Start: 2025-01-01 | End: 2025-01-03

## 2025-01-01 RX ORDER — ALPRAZOLAM 0.25 MG/1
0.5 TABLET ORAL DAILY
Refills: 0 | Status: DISCONTINUED | OUTPATIENT
Start: 2025-01-01 | End: 2025-01-03

## 2025-01-01 RX ORDER — GABAPENTIN 300 MG/1
300 CAPSULE ORAL EVERY 8 HOURS
Refills: 0 | Status: DISCONTINUED | OUTPATIENT
Start: 2025-01-01 | End: 2025-01-03

## 2025-01-01 RX ORDER — METRONIDAZOLE 250 MG/1
500 TABLET ORAL EVERY 12 HOURS
Refills: 0 | Status: DISCONTINUED | OUTPATIENT
Start: 2025-01-01 | End: 2025-01-02

## 2025-01-01 RX ORDER — DILTIAZEM HYDROCHLORIDE 300 MG/1
240 CAPSULE, COATED, EXTENDED RELEASE ORAL DAILY
Refills: 0 | Status: DISCONTINUED | OUTPATIENT
Start: 2025-01-01 | End: 2025-01-03

## 2025-01-01 RX ORDER — POTASSIUM CHLORIDE 600 MG/1
10 TABLET, FILM COATED, EXTENDED RELEASE ORAL
Refills: 0 | Status: COMPLETED | OUTPATIENT
Start: 2025-01-01 | End: 2025-01-01

## 2025-01-01 RX ORDER — ESOMEPRAZOLE SODIUM 40 MG/1
1 INJECTION, POWDER, LYOPHILIZED, FOR SOLUTION INTRAVENOUS
Refills: 0 | DISCHARGE

## 2025-01-01 RX ORDER — SODIUM CHLORIDE 9 MG/ML
750 INJECTION, SOLUTION INTRAMUSCULAR; INTRAVENOUS; SUBCUTANEOUS
Refills: 0 | Status: COMPLETED | OUTPATIENT
Start: 2025-01-01 | End: 2025-01-02

## 2025-01-01 RX ORDER — ALPRAZOLAM 0.25 MG/1
1 TABLET ORAL
Refills: 0 | DISCHARGE

## 2025-01-01 RX ORDER — GABAPENTIN 300 MG/1
1 CAPSULE ORAL
Refills: 0 | DISCHARGE

## 2025-01-01 RX ORDER — SODIUM CHLORIDE 9 MG/ML
1000 INJECTION, SOLUTION INTRAMUSCULAR; INTRAVENOUS; SUBCUTANEOUS ONCE
Refills: 0 | Status: COMPLETED | OUTPATIENT
Start: 2025-01-01 | End: 2025-01-01

## 2025-01-01 RX ORDER — LOSARTAN POTASSIUM 100 MG/1
100 TABLET, FILM COATED ORAL DAILY
Refills: 0 | Status: DISCONTINUED | OUTPATIENT
Start: 2025-01-01 | End: 2025-01-03

## 2025-01-01 RX ORDER — DILTIAZEM HYDROCHLORIDE 300 MG/1
1 CAPSULE, COATED, EXTENDED RELEASE ORAL
Refills: 0 | DISCHARGE

## 2025-01-01 RX ORDER — PANTOPRAZOLE 40 MG/1
40 TABLET, DELAYED RELEASE ORAL
Refills: 0 | Status: DISCONTINUED | OUTPATIENT
Start: 2025-01-01 | End: 2025-01-03

## 2025-01-01 RX ADMIN — ONDANSETRON 4 MILLIGRAM(S): 4 TABLET ORAL at 14:28

## 2025-01-01 RX ADMIN — POTASSIUM CHLORIDE 100 MILLIEQUIVALENT(S): 600 TABLET, FILM COATED, EXTENDED RELEASE ORAL at 16:45

## 2025-01-01 RX ADMIN — GABAPENTIN 300 MILLIGRAM(S): 300 CAPSULE ORAL at 22:05

## 2025-01-01 RX ADMIN — POTASSIUM CHLORIDE 100 MILLIEQUIVALENT(S): 600 TABLET, FILM COATED, EXTENDED RELEASE ORAL at 22:05

## 2025-01-01 RX ADMIN — POTASSIUM CHLORIDE 40 MILLIEQUIVALENT(S): 600 TABLET, FILM COATED, EXTENDED RELEASE ORAL at 16:45

## 2025-01-01 RX ADMIN — SODIUM CHLORIDE 75 MILLILITER(S): 9 INJECTION, SOLUTION INTRAMUSCULAR; INTRAVENOUS; SUBCUTANEOUS at 18:53

## 2025-01-01 RX ADMIN — METRONIDAZOLE 100 MILLIGRAM(S): 250 TABLET ORAL at 22:05

## 2025-01-01 RX ADMIN — CIPROFLOXACIN HYDROCHLORIDE 200 MILLIGRAM(S): 500 TABLET, FILM COATED ORAL at 23:29

## 2025-01-01 RX ADMIN — POTASSIUM CHLORIDE 100 MILLIEQUIVALENT(S): 600 TABLET, FILM COATED, EXTENDED RELEASE ORAL at 18:08

## 2025-01-01 RX ADMIN — SODIUM CHLORIDE 1000 MILLILITER(S): 9 INJECTION, SOLUTION INTRAMUSCULAR; INTRAVENOUS; SUBCUTANEOUS at 14:28

## 2025-01-01 RX ADMIN — MAGNESIUM SULFATE 100 GRAM(S): 500 INJECTION, SOLUTION INTRAMUSCULAR; INTRAVENOUS at 16:45

## 2025-01-01 NOTE — ED PROVIDER NOTE - PROGRESS NOTE DETAILS
All labs reviewed.  Patient has a marked hyponatremia which is similar to recent ED visit, worsening hypokalemia.  Mag added to labs although was 1.6 on recent draws.  Repletion was ordered.  Shared decision making with patient, she does not feel comfortable going home.  Will treat as failure of outpatient antiemetics and admit.  I, Angel Mosher, personally discussed this patient's care with Dr. Willett attending hospitalist who recommends admit to [facility].

## 2025-01-01 NOTE — PATIENT PROFILE ADULT - FALL HARM RISK - FACTORS NURSING JUDGEMENT
I've included a brief summary of our discussion and care plan from today's visit below.  Please review this information with your primary care provider.  _______________________________________________________________________      1. It was wonderful getting a chance to meet with you to discuss your ileal Crohn's Disease, particularly given the overall excellent clinical + endoscopic response you've had thus far - this is great to see, keep up the good work!  - Continue Lialda (generic) 2.4g daily as ordered for now.  - Continue omeprazole 20mg, taken every morning, as ordered for now given your excellent clinical response of your symptomatic GERD. Will readdress planning for taper trial off acid suppression in your ongoing care, particularly given the upcoming oral surgeries and imminent NSAID exposures. Recommend daily MVI as long-term acid suppression may be required, discussed risks/benefits of PPI therapy today.  - Discussed NSAID, opioid pain management, and antibiotic planning for upcoming oral surgeries (periodontal surgery and grafting for bilateral periodontal disease/tissue retraction involving the molars). I am OK with very limited exposures in these regards if needed for best recovery outcomes, discussed danger signs/symptoms to monitor for that might warrant change in plan or reassessment today. I'm very glad that the last round of procedures went well, please keep us updated on your progress.  - Discussed Preventive Care in IBD, no need for colonic dysplasia surveillance given ileal-limited disease at this time.  - Recommend repeat Colon Cancer Surveillance in 2027, unless symptomatic sooner.     2. Recommend having the following studies checked at least 1-2 times/year for disease and medication safety monitoring: BMP, LFT, CBC with differential, ESR/CRP.     3. Continue to monitor for the danger signs/symptoms we reviewed together today:  worsening abdominal pain, worsening diarrhea, obstructive  symptoms (nausea/vomiting, abdominal distention, inability to pass stool/flatus), blood mixed into stools, persistent fevers/chills, progressive anemia (particulary with iron deficiency), unexpected weight loss, etc.  - Contact us via [facility] should these symptoms occur, particularly as we may advise further evaluation accordingly.    4. Return to GI Clinic with my GI Physician Assistants (Benjamin or Alice) or me in 6-8 months to review your progress, sooner if symptomatic.   - If you are unable to schedule this follow-up appointment today, please contact our  at [phone] within the next week to help set up this necessary appointment.    _______________________________________________________________________    It was a pleasure seeing you in clinic today - please be in touch if there are any further questions that arise following today's visit.  During business hours, you may reach Clinic Nurse Triage Line at [phone].  For urgent/emergent questions after business hours, you may reach the on-call GI Fellow by contacting the [facility]  at [phone].    Any benign/non-urgent test results are usually communicated via letter or [employer] message within 1-2 weeks after completion.  Urgent results (those that require a change in the previously-discussed care plan) are usually communicated via a phone call once available from our clinic staff to discuss the results and the next steps in your evaluation.    I recommend signing up for [employer] access if you have not already done so and are comfortable with using a computer.  This allows for online access to your lab results and also helps you communicate efficiently with my clinic should any questions arise in your care.    We have Financial Counseling services available through our clinic.  If you have questions about your insurance coverage or payment responsibilities, particularly before you undergo any tests or procedures,  please let us know and we can arrange a consultation accordingly to help you make informed decisions about your healthcare.    Sincerely,    Rudy Morfin MD    [facility] - Department of Medicine  Division of Gastroenterology     No

## 2025-01-01 NOTE — H&P ADULT - ASSESSMENT
70 y/o F w/ PMH of anxiety, HTN, hypothyroidism, IBS, p/w vomiting    *Colitis   -CT Abd: Apparent wall thickening of segments of the colon, as described, which may be due to inadequate distention. Correlate clinically for colitis. Stable mild prominence of the left intrahepatic biliary ducts. Stable subcentimeter calcified lesion in the right hepatic lobe.  -Cipro / flagyl  -IVF  -NPO  -GI consult   -Diarrhea has resolved     *Hyponatremia  -Urine Na, Urine & Serum Osm  -Recheck s/p IVF  -Hold HCTZ for now     *Hypokalemia  -Replete and recheck   -EKG:     *L renal hypodensities on CT  -F/u outpatient     *DVT ppx  -SCDs     >75 mins required for admission  68 y/o F w/ PMH of anxiety, HTN, hypothyroidism, IBS, p/w vomiting    *Colitis   -CT Abd: Apparent wall thickening of segments of the colon, as described, which may be due to inadequate distention. Correlate clinically for colitis. Stable mild prominence of the left intrahepatic biliary ducts. Stable subcentimeter calcified lesion in the right hepatic lobe.  -Cipro / flagyl  -IVF  -NPO  -GI consult   -Diarrhea has resolved     *Hyponatremia  -Urine Na, Urine & Serum Osm  -Recheck s/p IVF  -Hold HCTZ for now     *Hypokalemia  -Replete and recheck   -EKG     *L renal hypodensities on CT  -F/u outpatient     *DVT ppx  -SCDs     >75 mins required for admission

## 2025-01-01 NOTE — PATIENT PROFILE ADULT - FOOD INSECURITY
[facility]  [address], [address]  [place] [address]  Dept Phone: [phone]    Dept Fax: [phone]       October 7, 2020      Yang Solitario MD  [address]  [place] [address]  Via Fax: [phone]    Patient: Coreen Padilla   YOB: 1951   Date of Visit:  10/5/2020       Dear Dr. Yang Solitario MD    Coreen Padilla was seen at the [facility] on 10/5/2020. Below are my notes for this visit with her.    If you have questions, please do not hesitate to call me. I look forward to following your patient along with you.    Sincerely,       Darren Gitelman, MD  Senior Medical Director  [facility]    cc: Michael J Hersh, MD Brett J Vassallo, MD Christine Ann Olson, MD Darren R Gitelman, MD  10/7/2020  1:00 AM  Signed  DATE OF SERVICE: 10/5/2020    REFERRING PHYSICIAN / PCP: Yang Solitario MD    Subjective     Coreen Padilla is a 69 year old female with a history of rapidly progressive dementia returning for follow-up. Her last visit was on 6/29/20.  She is accompanied by her spouse, Ravi.     Recommendations from last visit:   - increase sertraline to  75 mg/day   - trial of Dilantin instead of Depakote- not tolerated.   - Mammogram- pending this week.    HPI:  Following her last visit Dilantin was started in order to get her off Depakote. However, the patient felt more anxious as Dilantin was increased. She also became increasingly dizzy. Dilantin was stopped.    She was seen at [facility] on 7/28/20 for headache and confusion. Labs and a head CT were negative and she was discharged.    She was subsequently transitioned off Depakote to lacosamide, which she is tolerating well.     Patient says she is still nervous, which affects her bladder, i.e., causes urgency.  She is not that nervous today.   She feels the nervousness also affects her memory.     says her anxiety causes her to question everything  she does. e.g., \"should I or shouldn't I do something.\" If she is sending a text she will ask her  what to say.  She is off the Depakote as of 2 days ago.  He is trying to get her to gain weight and she is eating an extra meal each day now.    Hospitalizations since the last visit: no  ER visits since the last visit: no    Cognitive:  thinks memory is impaired but stable.  Neuropsychiatric: Anxiety  Exercise: Elliptical and walking, floor exercises.   Activity: Lumosity, meditation  Sleep: OK.  Appetite: OK, but has still lost weight.  Pain: stomach cramps.  Falls: no    Caregiver Stress: Moderate    Current cognitive meds: none  Past known cognitive meds: none  Current psych meds: sertraline 50 mg/day   Past known psych meds: none    Safety/Planning:  Home situation: Lives at home with .  Med ID: no  Shower bar/tub seat: no  POA: yes    Functional Assessment:  Driving: No    Instrumental Activities of Daily Living (IADLs):   Independent Assisted Not Performing Comments   Public transportation [] [x] []    Shopping [] [x] []    Household chores [] [x] [] ?    Meal preparation [] [x] [] ?    Finances [] [] [x]    Medications [] [x] [] ?  organizes, but she takes on her own   Telephone [x] [] []    Laundry [x] [] []    Socializing [] [x] []    > Moderate impairment. Some decline.    Activities of Daily Living (ADLs):   Independent Assisted Not Performing Comments   Dressing [x] [] []    Bathing [x] [] []    Personal grooming [x] [] []    Toileting [x] [] []    Eating [x] [] []    Transferring [x] [] []    > Independent. Stable    Allergy     ALLERGIES:   Allergen Reactions   • Amoxicillin RASH   • Dilantin DIZZINESS     Lightheadedness   • Penicillins RASH     amoxicillin     Medications     Current Medications    CALCIUM CARBONATE (CALCIUM 600 PO)    Take 1 tablet by mouth 2 times daily.    CHOLECALCIFEROL (VITAMIN D3) 25 MCG (1,000 UNITS) CAPSULE    Take 2,000 Units by mouth 3 days a  week.    LACOSAMIDE (VIMPAT) 50 MG TAB    Take 2 tablets by mouth 2 times daily.    LEVOTHYROXINE 50 MCG TABLET    Take 50 mcg by mouth.    MULTIPLE VITAMIN (MULTI-VITAMINS) TAB    Take 1 tablet by mouth daily.    MYRBETRIQ 25 MG 24 HOUR TABLET    TK 1 T PO QD    PANTOPRAZOLE (PROTONIX) 40 MG TABLET    Take 40 mg by mouth daily.    POLYETHYLENE GLYCOL (MIRALAX) 17 G PACKET    Take 17 g by mouth 2 times daily.    PRAVASTATIN (PRAVACHOL) 40 MG TABLET    Take 1 tablet by mouth nightly.    SERTRALINE (ZOLOFT) 100 MG TABLET    Take 1 tablet by mouth 2 times daily.    VITAMIN B-12 (CYANOCOBALAMIN) 1000 MCG TABLET    Take 1,000 mcg by mouth daily.     Problem List     Patient Active Problem List   Diagnosis   • Cerebral atrophy (CMS/HCC)   • CKD (chronic kidney disease) stage 3, GFR 30-59 ml/min   • Depressive disorder   • Hallux limitus   • Hearing loss   • Hyperlipidemia   • Hypertonicity of bladder   • Hypothyroidism   • Onychomycosis   • Osteopenia   • Postmenopausal atrophic vaginitis   • Seizure disorder (CMS/HCC)   • GERD (gastroesophageal reflux disease)   • Memory loss   • Vitamin D deficiency   • Rapidly progressive dementia (CMS/HCC)   • Hypercalcemia   • Lightheadedness   • Abnormal CT of the chest   • Acute nonintractable headache   • Anxiety   • SOPHY (generalized anxiety disorder)     Review:  Patient's medications, allergies, past medical, surgical, social and family histories were reviewed and updated as appropriate.    Review of Systems   Review of Systems   Constitutional: Positive for unexpected weight change (decreased).   HENT: Positive for hearing loss (worsening).    Eyes: Negative.    Respiratory: Negative.    Cardiovascular: Negative.    Gastrointestinal: Positive for abdominal pain and constipation.   Endocrine: Negative.    Genitourinary: Positive for urgency.        Gets cramps when she empties her bladder.  nocturia 2-3 times  urinary incontinence   Musculoskeletal: Negative.    Skin: Negative.          hair loss   Neurological: Positive for dizziness.   Hematological: Negative.    Psychiatric/Behavioral:        Anxiety      Objective     Visit Vitals  /72 (BP Location: LUE - Left upper extremity, Patient Position: Sitting, Cuff Size: Regular)   Pulse 63   Resp 16   Ht 5' 3\" (1.6 m)   Wt 47 kg (103 lb 9.9 oz)   BMI 18.35 kg/m²     Weight is down 8 lbs.    Wt Readings from Last 3 Encounters:   10/05/20 47 kg (103 lb 9.9 oz)   06/29/20 50.4 kg (111 lb)   05/28/20 51 kg (112 lb 7 oz)      Affect: mildly anxious  Hygiene: good  Insight: reduced.    Date: October 5, 2020.  Location: [facility], 5th floor.    Physical Exam   Constitutional:   Thin older woman in no distress.   She is wearing a wig.   Cardiovascular: Normal rate, regular rhythm and normal heart sounds.   Pulmonary/Chest: Effort normal and breath sounds normal.   Abdominal: She exhibits no distension. There is no abdominal tenderness.   Musculoskeletal:         General: No edema.   Lymphadenopathy:     She has no cervical adenopathy.   Neurological: She has normal strength. Gait normal.   Reflex Scores:       Bicep reflexes are 3+ on the right side and 3+ on the left side.       Patellar reflexes are 3+ on the right side and 3+ on the left side.       Achilles reflexes are 2+ on the right side and 2+ on the left side.  Vitals reviewed.      Neurological Exam    Cranial Nerves  CN II: Visual fields full to confrontation.  CN III, IV, VI: Extraocular movements intact bilaterally.  CN VII: Full and symmetric facial movement.  CN VIII: Hearing is normal.  CN XI: Shoulder shrug strength is normal.    Motor   The following abnormal movements were seen: Resting tremor- none.  Postural tremor- none  Kinetic tremor- slight. Strength is 5/5 throughout all four extremities.    Sensory  Vibration abnormality:     Reflexes                                           Right                      Left  Biceps                                 3+                          3+  Patellar                                3+                         3+  Achilles                                2+                         2+  Plantar                           Downgoing                Equivocal    Right pathological reflexes: Chung's present. Crossed adductor present. Ankle clonus absent.  Left pathological reflexes: Chung's absent. Crossed adductor absent. Ankle clonus absent.  There is some spread of reflexes..    Coordination  Right: Finger-to-nose normal. Rapid alternating movement normal.  Left: Finger-to-nose normal. Rapid alternating movement abnormality: Mildly irregular.  Finger tap: right- normal, left- normal.    Gait  Casual gait: Wide stance. Base is mildly widened.. Normal stride length. Normal gait. Romberg is absent.    Testing Scores       [id number]   MoCA 17 21   Geriatric Depression Scale   3      STMS (8/1/18)- 31/38    Results     BRAIN MRI wo/w contrast (3/10/20, report only): Findings- There is mild diffuse cortical atrophy. Mild ventriculomegaly likely relates to central atrophy. No evidence of hemorrhage or mass effect. Foci of T2 hyperintensity in the hemispheric white matter bilaterally are nonspecific although most commonly associated with chronic microvascular ischemia. There is no evidence of recent infarct on diffusion weighted images. There is no signal abnormality in the right frontal lobe to correspond with the perfusion abnormality from the previous day's study. There is no restricted diffusion or abnormal contrast enhancement demonstrated. IMPRESSION  1. There are a few tiny foci of T2 hyperintensity compatible with chronic small vessel ischemic change.  2. There is no evidence of recent infarct, hemorrhage, or mass effect.     BRAIN MRI w/wo contrast (3/2/20, personally reviewed): IMPRESSION: No acute findings. There is evidence of cerebral atrophy beyond expected for age.   > On my review, I agree with the report and note mild bilateral  hippocampal atrophy, mild ventriculomegaly and mild to moderate biparietal atrophy.     BRAIN MRI wo / w contrast (7/3/19, report only): IMPRESSION:   1. No abnormal mass/enhancement in the cerebellopontine angle cistern or internal auditory canal on either side. Mastoids/middle ears appear clear.   2. Very mild cerebral white matter microvascular ischemic changes most prominent in the left parietal lobe. No acute infarct. No change in parietal lobe volume loss. Again seen is a very small focus of encephalomalacia in the cerebellum discussed above.     CT brain perfusion, CTA head, CTA neck (3/9/20, report only): FINDINGS: Color maps show no evident ischemic penumbra. There is however apparent increase in cerebral blood flow and volumes with decreased time to drain and mean transit time in the right frontal lobe. There appears to be slightly increased flow/ vascularity associated in the right frontal region. There are atheromatous changes involving the aorta. Origins of the great vessels are patent with a normal branch pattern. Vertebral arteries are codominant and form the basilar artery. Basilar artery branches into the bilateral superior cerebellar and posterior cerebral arteries. Common carotid arteries branch into the internal / external carotid arteries without hemodynamically significant stenosis. Internal carotid arteries branch into the anterior and middle cerebral arteries. Fascial planes and spaces of the neck are preserved. There are degenerative changes involving the spine. IMPRESSION:   1. Abnormal perfusion in the right frontal lobe not suggestive of an infarct and likely related to ictal / postictal changes for which follow-up is recommended.  2. No hemodynamically significant stenosis.      CT BRAIN wo contrast (3/1/20): IMPRESSION:  1. No CT evidence of acute intracranial finding.  2. Involutional changes.     HEAD CT wo contrast (11/1/18): IMPRESSION: Volume loss and small chronic right SCA  infarct.     FDG-PET (6/11/20, personally reviewed): IMPRESSION:  1.   Slightly prominent ventricular system and cisternal/sulcal spaces, nonspecific cerebral atrophy versus NPH.  2.   No evidence for Alzheimer's disease, frontotemporal dementia or Lewy body disease. The isotope activity appear to be relatively within normal limits.  > On review of the FDG-PET images in comparison with a normal database. Some of the differences look like artifact, e.g., around the cerebellum. The posterior cingulate hypometabolism could suggest AD, but the changes are insufficient to make this diagnosis.    CT CHEST / ABD / PELVIS w contrast (6/18/20): IMPRESSION:  1.   No CT evidence of malignancy.  2.   Mildly improved infectious/inflammatory findings in the right lung.  The distribution may suggest indolent atypical mycobacterial (i.e. MAC) infection.     CT CHEST W/O contrast (3/2/20, images reviewed with radiology): IMPRESSION  I. Limited by lack of IV contrast.  2. Right middle lobe small to moderate consolidation.  3. Right upper lobe tree-in-bud opacities likely representing an infectious etiology.  4. No pleural effusion or pneumothorax.  5. Heart size is normal.  6. No pericardial effusion.    Neuropsychological testing (7/7/20- rest of in person testing): Deficits noted in psychomotor information processing speed, variable executive functions, naming and semantic fluency, judgment of line orientation, learning and memory for verbal material (recognition for the story improved to the average range, but for the word list it did not). The pattern of findings is consistent with Alzheimer's disease although it is noted that CSF testing and an FDG-PET scan were not consistent with that diagnosis. Findings were also not suggestive of frontotemporal dementia or primary progressive aphasia.      Neuropsychological testing (4/22/20- Video): Executive functioning was variable. Performance was average for set-shifting on an oral  alphanumeric sequencing task, which was performed without error, and phonemic fluency was low average. However, verbal abstract reasoning was borderline to mildly impaired. Regarding language, both semantic fluency and naming in response to verbal cues were severely impaired. Comprehension of complex material presented auditorily via yes/no questions and stories was also impaired. Learning and memory for verbal material (story and word list) were impaired.      LABS  ApoE genotype (8/22/18): e3/e4.     5/28/20  BMP- normal except BUN- 21. Calcium = 9.8  PTH related peptide- 2.4 (normal)  Tropheryma whipplei- not detected  Thyroid peroxidase 48 (negative)  SYED panel negative (SYED, dsDNA, Chromatin, Ribosomal P, SSA, SSB, Sm, Sm/RNP, RNP, Scl-70, Keren-1 and Centromere)   ionized calcium- 1.33 (H)  Intact PTH- 58     5/8/20  CSF glucose: 56  CSF protein: 38  CSF cell count and diff: WBC-0, RBC-0  CSF IGG Index: 0.50  CSF oligoclonal bands: negative  CSF Crypto Ag- negative  CSF Lyme- 0.06 (negative)  CSF cytology- negative for malignancy  CSF 14-3-3: Comment: LIKELIHOOD OF PRION DISEASE: <2%   Test Result   RT-QUIC Negative   T-TAU 32 pg/ml   14-3-3 protein Negative      CSF ABETA/ PHOSPHO-TAU/TOTAL-TAU  Interpretation: Not Consistent with Alzheimer Disease   Test Technical Result   Abeta42:   824.65 pg/ml   T-Tau:   301.05 pg/ml   P-Tau:   39.25 pg ml   ATI:   1.39   Comments: This analysis detected CSF levels of A-beta 42 peptide (A-beta 42), total tau (T-tau) and phospho-tau (P-tau) proteins which are not consistent with a diagnosis of Alzheimer's disease (AD).      5/6/20  SARS-CoV-2 PCR- negative.     4/24/20  SARS-CoV-2-IgG Ab - negative  CMP- normal  Ammonia- 31 (normal)     4/17/20  Vitamin B6- 32 (normal)  Vitamin B1- 134 (normal)  U/A- normal except for 1+ protein, few bacteria  Thyroglobulin Ab- < 15.0  Lyme Ab- non-reactive  ESR- 4  Myeloperoxidase IgG- < 0.2  Serum autoimmune panel ENS2: negative (AMPA-R,  Amphiphysin, AGNA-1, CHINO-1, CHINO-2, CHINO-3, CASPR2, CRMP-5, DPPX, JENNIFER-B, GAD65, GFAP, LGI1, mGluR1, NMDA-R, VGCC N-type, VGCC P/Q-type, PCA-1, PCA-2, PCA-Tr)   TPPA- negative  Proteinase 3 < 0.2  CRP < 0.4  SYED screen- negative     4/8/20  CBC- normal except plts- 122 k   CMP- normal except BUN- 24, Cr- 1.22  Vitamin B12- 1,808  Vitamin D 25-OH- 28.7  Valproic acid- 129.5  TSH- 0.511  RPR- nonreactive.     3/14/20  Parathyroid Hormone (Intact) 47 (Calcium was 11.2 on 3/13/20):  Vitamin D 25-Hydroxy 58.     3/1/20  ESR- 15  CRP- < 0.29  TSH- 2.25  Vitamin B12- 1582  Folate- >24.0  Legionella Ag- negative  Mycoplasma IGM- negative  Influenza A IGM < 1:10, IGG 1:320     10/8/19  Lipid panel: Cholesterol: 249; Triglycerides: 69; HDL: 94; LDL: 141      EEG (4/30/20): Impression and Clinical Correlation: This is a normal EEG of wakefulness and sleep, without focal, paroxysmal, or epileptiform features. Clinical Correlation: The absence of epileptiform abnormalities does not preclude a diagnosis of seizure.      EEG (3/10/20): IMPRESSION: This EEG is abnormal due to the presence of bilateral slowing. These findings are consistent with bilateral cerebral dysfunction and the diagnosis of an encephalopathy or toxic, metabolic or other etiology. There were no epileptiform discharges seen.     EEG (3/3/20): IMPRESSION: This EEG performed during the waking, drowsy and sleep states is normal. There were no definite focal or epileptiform discharges seen.     Assessment/Plan     Summary:  returns for follow-up. She continues to have cognitive impairment and there may be more functional impairment. She is more anxious. We discussed again the course of her symptoms. She had been functioning fairly normally (although perhaps with some subclinical memory loss) until March 2020, when she presented to an outside hospital with seizures. She subsequently had 2 more hospitalizations and a rapid decline in cognition, but symptoms  then stabilized and she has declined very little in the ensuing months. She has also developed anxiety, which is causing more distress in recent months. Additional symptoms have included constipation, urinary urgency, hair loss (possibly due to Depakote), hearing loss and weight loss.    Workup has not revealed the underlying cause. Testing has not been consistent with Alzheimer's disease, prion disease, various infections (syphilis, Lyme, mycoplasma, legionella, Whipples, SARS-CoV-2), various immune related disorders (Hashimoto's, SYED screen, paraneoplastic, ANCA), vitamin deficiencies (B12, folate, B1 or B6) or thyroid dysfunction. Also, even though she had hyponatremia there was no evidence of myelinolysis on her brain MRI's. A CT of the chest/ abdomen/ pelvis has not suggested either neoplastic pathology or sarcoid. EEG's have not shown subclinical seizures. I suspect that she may have a neurodegenerative cause such as Lewy body dementia (because of psychiatric symptoms) although she does not have fluctuations, hallucinations, parkinsonism or REM behavior disorder or perhaps the newly described limbic-predominant age-related tdp-43 encephalopathy (LATE). Her clinical profile does not suggest a frontotemporal dementia.      asked about a trial of a cognitive enhancing medication such as donepezil. However, she's had 2 tests (CSF biomarker and FDG-PET) that are inconsistent with the diagnosis of AD for which this medication is approved, and she is underweight. Nevertheless I would consider an empiric trial if she can gain 10-15 lbs. Similar reasoning applies to memantine.    Recommendations  1) Dermatology consult if hair loss continues  2) EKG - to check QTc and MS.  3) Monitor weight and increase caloric intake  4) RTC in 4 months.    Problem List Items Addressed This Visit        Active Problems    SOPHY (generalized anxiety disorder)     · F/U with psychiatry.         Rapidly progressive dementia  (CMS/[facility]) - Primary     · Clinical status is consistent with a mild level of dementia.  · Detailed evaluation has not indicated a cause for her cognitive decline. See above summary.  · Depakote has been switched to lacosamide for her seizure disorder.   · She will follow-up with her psychiatrist about her anxiety.   · Safety: She is not driving.  is managing their finances and organizing her medications.         Relevant Orders    ELECTROCARDIOGRAM 12-LEAD    Seizure disorder (CMS/[facility]) (Chronic)     · Continue lacosamide  · Check EKG.  · Depakote discontinued.         Relevant Orders    ELECTROCARDIOGRAM 12-LEAD        Return in about 4 months (around 2/5/2021) for follow-up at the [facility].    Medical compliance with plan discussed and risks of non-compliance reviewed.  Education completed on disease process, etiology & prognosis.  Patient and/or family expresses understanding of the plan.    Time Spent: 59 minutes  8:35 - 9:24 am    Greater than 50% of the total time was spent counseling and/or coordinating care.    Darren R Gitelman, MD  Director of Cognitive Disorders, [facility]  Senior Medical Director, [facility]                  no

## 2025-01-01 NOTE — ED PROVIDER NOTE - OBJECTIVE STATEMENT
68 y/o female with a PMHx of anxiety, HTN, hypothyroid, IBS presents to the ED c/o n/v since 12/28. Pt was seen in ED 2 days ago for n/v/d. Pt reports diarrhea has resolved after taking Imodium but she is still having n/v. No other complaints at this time.

## 2025-01-01 NOTE — ED PROVIDER NOTE - CARE PLAN
Principal Discharge DX:	Nausea and vomiting  Secondary Diagnosis:	Hypokalemia  Secondary Diagnosis:	Hyponatremia   1

## 2025-01-01 NOTE — H&P ADULT - HISTORY OF PRESENT ILLNESS
70 y/o F w/ PMH of anxiety, HTN, hypothyroidism, IBS, p/w vomiting. Patient states that symptoms first started on 12/28 w/ diarrhea. She had about 2-3 episodes of diarrhea, and took immodium, and diarrhea resolved. States that she also developed vomiting about 3-4 times per day and some mild abdominal discomfort, but no pain. States she has not had any vomiting today, and last time she vomited was yesterday when she had 2 episodes. Patient c/o feeling hot and cold chills, but denies fever.     PSH: lumbar surgery, tosillectomy     Social Hx: Denies tobacco / drugs, etoh - occasional     Family Hx: Denies pertinent hx

## 2025-01-01 NOTE — PATIENT PROFILE ADULT - FUNCTIONAL ASSESSMENT - BASIC MOBILITY 6.
4 = No assist / stand by assistance PCP- follow up in 1 week. Bring these papers with you to that appointment so your PCP can request records from your hospital stay.

## 2025-01-01 NOTE — PROVIDER CONTACT NOTE (CRITICAL VALUE NOTIFICATION) - DATE AND TIME:
01-Jan-2025 14:52 Dupixent Counseling: I discussed with the patient the risks of dupilumab including but not limited to eye infection and irritation, cold sores, injection site reactions, worsening of asthma, allergic reactions and increased risk of parasitic infection.  Live vaccines should be avoided while taking dupilumab. Dupilumab will also interact with certain medications such as warfarin and cyclosporine. The patient understands that monitoring is required and they must alert us or the primary physician if symptoms of infection or other concerning signs are noted.

## 2025-01-01 NOTE — ED PROVIDER NOTE - CLINICAL SUMMARY MEDICAL DECISION MAKING FREE TEXT BOX
69-year-old female presenting with multiple days of nausea and vomiting, unable to tolerate p.o.  Recently seen in the ED for the same.  CT of the head demonstrated some thickening of the colon, possible colitis but otherwise no acute pathology.  Labs notable for hyponatremia, hypokalemia.  Patient overall well-appearing, appears uncomfortable but no acute distress, no significant abdominal findings.  Differential included but not limited to: Worsening colitis, gastroenteritis, dehydration, electrolyte abnormality, other.  Will evaluate with blood work, symptomatic treatment, reassess.  Due to failed outpatient treatment, may require admission.

## 2025-01-01 NOTE — H&P ADULT - NSVTERISKREFERASSESS_GEN_ALL_CORE
Mom states pt having intermittent nausea, vomiting, abdominal pain and weakness x2 weeks.  Abdomen soft, non distended, pt c/o generalized tenderness with palpation. Refer to the Assessment tab to view/cancel completed assessment. no

## 2025-01-01 NOTE — ED ADULT TRIAGE NOTE - CHIEF COMPLAINT QUOTE
pt BIBA from home complaining of N/V since 12/28.  pt was seen at  ED and given medication but didn't take it continuously.  pt feels dehyrated

## 2025-01-02 LAB
ALBUMIN SERPL ELPH-MCNC: 3.1 G/DL — LOW (ref 3.3–5)
ALP SERPL-CCNC: 32 U/L — LOW (ref 40–120)
ALT FLD-CCNC: 23 U/L — SIGNIFICANT CHANGE UP (ref 12–78)
ANION GAP SERPL CALC-SCNC: 4 MMOL/L — LOW (ref 5–17)
ANION GAP SERPL CALC-SCNC: 6 MMOL/L — SIGNIFICANT CHANGE UP (ref 5–17)
ANION GAP SERPL CALC-SCNC: 7 MMOL/L — SIGNIFICANT CHANGE UP (ref 5–17)
ANION GAP SERPL CALC-SCNC: 9 MMOL/L — SIGNIFICANT CHANGE UP (ref 5–17)
AST SERPL-CCNC: 22 U/L — SIGNIFICANT CHANGE UP (ref 15–37)
BASOPHILS # BLD AUTO: 0.03 K/UL — SIGNIFICANT CHANGE UP (ref 0–0.2)
BASOPHILS NFR BLD AUTO: 0.5 % — SIGNIFICANT CHANGE UP (ref 0–2)
BILIRUB SERPL-MCNC: 0.5 MG/DL — SIGNIFICANT CHANGE UP (ref 0.2–1.2)
BUN SERPL-MCNC: 6 MG/DL — LOW (ref 7–23)
BUN SERPL-MCNC: 7 MG/DL — SIGNIFICANT CHANGE UP (ref 7–23)
CALCIUM SERPL-MCNC: 7.8 MG/DL — LOW (ref 8.5–10.1)
CALCIUM SERPL-MCNC: 7.9 MG/DL — LOW (ref 8.5–10.1)
CALCIUM SERPL-MCNC: 8.1 MG/DL — LOW (ref 8.5–10.1)
CALCIUM SERPL-MCNC: 8.7 MG/DL — SIGNIFICANT CHANGE UP (ref 8.5–10.1)
CHLORIDE SERPL-SCNC: 101 MMOL/L — SIGNIFICANT CHANGE UP (ref 96–108)
CHLORIDE SERPL-SCNC: 96 MMOL/L — SIGNIFICANT CHANGE UP (ref 96–108)
CHLORIDE SERPL-SCNC: 98 MMOL/L — SIGNIFICANT CHANGE UP (ref 96–108)
CHLORIDE SERPL-SCNC: 98 MMOL/L — SIGNIFICANT CHANGE UP (ref 96–108)
CO2 SERPL-SCNC: 21 MMOL/L — LOW (ref 22–31)
CO2 SERPL-SCNC: 21 MMOL/L — LOW (ref 22–31)
CO2 SERPL-SCNC: 22 MMOL/L — SIGNIFICANT CHANGE UP (ref 22–31)
CO2 SERPL-SCNC: 24 MMOL/L — SIGNIFICANT CHANGE UP (ref 22–31)
CREAT SERPL-MCNC: 0.39 MG/DL — LOW (ref 0.5–1.3)
CREAT SERPL-MCNC: 0.42 MG/DL — LOW (ref 0.5–1.3)
CREAT SERPL-MCNC: 0.46 MG/DL — LOW (ref 0.5–1.3)
CREAT SERPL-MCNC: 0.48 MG/DL — LOW (ref 0.5–1.3)
EGFR: 102 ML/MIN/1.73M2 — SIGNIFICANT CHANGE UP
EGFR: 103 ML/MIN/1.73M2 — SIGNIFICANT CHANGE UP
EGFR: 105 ML/MIN/1.73M2 — SIGNIFICANT CHANGE UP
EGFR: 107 ML/MIN/1.73M2 — SIGNIFICANT CHANGE UP
EOSINOPHIL # BLD AUTO: 0.07 K/UL — SIGNIFICANT CHANGE UP (ref 0–0.5)
EOSINOPHIL NFR BLD AUTO: 1.2 % — SIGNIFICANT CHANGE UP (ref 0–6)
GLUCOSE SERPL-MCNC: 104 MG/DL — HIGH (ref 70–99)
GLUCOSE SERPL-MCNC: 108 MG/DL — HIGH (ref 70–99)
GLUCOSE SERPL-MCNC: 84 MG/DL — SIGNIFICANT CHANGE UP (ref 70–99)
GLUCOSE SERPL-MCNC: 95 MG/DL — SIGNIFICANT CHANGE UP (ref 70–99)
HCT VFR BLD CALC: 33.8 % — LOW (ref 34.5–45)
HGB BLD-MCNC: 11.6 G/DL — SIGNIFICANT CHANGE UP (ref 11.5–15.5)
IMM GRANULOCYTES NFR BLD AUTO: 0.3 % — SIGNIFICANT CHANGE UP (ref 0–0.9)
LYMPHOCYTES # BLD AUTO: 1.11 K/UL — SIGNIFICANT CHANGE UP (ref 1–3.3)
LYMPHOCYTES # BLD AUTO: 18.9 % — SIGNIFICANT CHANGE UP (ref 13–44)
MCHC RBC-ENTMCNC: 28.9 PG — SIGNIFICANT CHANGE UP (ref 27–34)
MCHC RBC-ENTMCNC: 34.3 G/DL — SIGNIFICANT CHANGE UP (ref 32–36)
MCV RBC AUTO: 84.1 FL — SIGNIFICANT CHANGE UP (ref 80–100)
MONOCYTES # BLD AUTO: 0.72 K/UL — SIGNIFICANT CHANGE UP (ref 0–0.9)
MONOCYTES NFR BLD AUTO: 12.2 % — SIGNIFICANT CHANGE UP (ref 2–14)
NEUTROPHILS # BLD AUTO: 3.93 K/UL — SIGNIFICANT CHANGE UP (ref 1.8–7.4)
NEUTROPHILS NFR BLD AUTO: 66.9 % — SIGNIFICANT CHANGE UP (ref 43–77)
OSMOLALITY SERPL: 256 MOSMOL/KG — LOW (ref 280–301)
PLATELET # BLD AUTO: 246 K/UL — SIGNIFICANT CHANGE UP (ref 150–400)
POTASSIUM SERPL-MCNC: 2.9 MMOL/L — CRITICAL LOW (ref 3.5–5.3)
POTASSIUM SERPL-MCNC: 3.5 MMOL/L — SIGNIFICANT CHANGE UP (ref 3.5–5.3)
POTASSIUM SERPL-MCNC: 3.8 MMOL/L — SIGNIFICANT CHANGE UP (ref 3.5–5.3)
POTASSIUM SERPL-MCNC: 4.3 MMOL/L — SIGNIFICANT CHANGE UP (ref 3.5–5.3)
POTASSIUM SERPL-SCNC: 2.9 MMOL/L — CRITICAL LOW (ref 3.5–5.3)
POTASSIUM SERPL-SCNC: 3.5 MMOL/L — SIGNIFICANT CHANGE UP (ref 3.5–5.3)
POTASSIUM SERPL-SCNC: 3.8 MMOL/L — SIGNIFICANT CHANGE UP (ref 3.5–5.3)
POTASSIUM SERPL-SCNC: 4.3 MMOL/L — SIGNIFICANT CHANGE UP (ref 3.5–5.3)
PROT SERPL-MCNC: 5.6 GM/DL — LOW (ref 6–8.3)
RBC # BLD: 4.02 M/UL — SIGNIFICANT CHANGE UP (ref 3.8–5.2)
RBC # FLD: 13 % — SIGNIFICANT CHANGE UP (ref 10.3–14.5)
SODIUM SERPL-SCNC: 126 MMOL/L — LOW (ref 135–145)
SODIUM SERPL-SCNC: 126 MMOL/L — LOW (ref 135–145)
SODIUM SERPL-SCNC: 127 MMOL/L — LOW (ref 135–145)
SODIUM SERPL-SCNC: 128 MMOL/L — LOW (ref 135–145)
WBC # BLD: 5.88 K/UL — SIGNIFICANT CHANGE UP (ref 3.8–10.5)
WBC # FLD AUTO: 5.88 K/UL — SIGNIFICANT CHANGE UP (ref 3.8–10.5)

## 2025-01-02 PROCEDURE — 93010 ELECTROCARDIOGRAM REPORT: CPT

## 2025-01-02 PROCEDURE — 99233 SBSQ HOSP IP/OBS HIGH 50: CPT

## 2025-01-02 RX ORDER — POLYSORBATE 80 100 MG/10ML
1 SOLUTION/ DROPS OPHTHALMIC EVERY 6 HOURS
Refills: 0 | Status: DISCONTINUED | OUTPATIENT
Start: 2025-01-02 | End: 2025-01-03

## 2025-01-02 RX ORDER — POTASSIUM CHLORIDE 600 MG/1
10 TABLET, FILM COATED, EXTENDED RELEASE ORAL
Refills: 0 | Status: COMPLETED | OUTPATIENT
Start: 2025-01-02 | End: 2025-01-02

## 2025-01-02 RX ORDER — POTASSIUM CHLORIDE 600 MG/1
40 TABLET, FILM COATED, EXTENDED RELEASE ORAL EVERY 4 HOURS
Refills: 0 | Status: COMPLETED | OUTPATIENT
Start: 2025-01-02 | End: 2025-01-02

## 2025-01-02 RX ORDER — MAG HYDROX/ALUMINUM HYD/SIMETH 200-200-20
30 SUSPENSION, ORAL (FINAL DOSE FORM) ORAL EVERY 4 HOURS
Refills: 0 | Status: DISCONTINUED | OUTPATIENT
Start: 2025-01-02 | End: 2025-01-02

## 2025-01-02 RX ADMIN — POTASSIUM CHLORIDE 100 MILLIEQUIVALENT(S): 600 TABLET, FILM COATED, EXTENDED RELEASE ORAL at 05:39

## 2025-01-02 RX ADMIN — SODIUM CHLORIDE 75 MILLILITER(S): 9 INJECTION, SOLUTION INTRAMUSCULAR; INTRAVENOUS; SUBCUTANEOUS at 05:41

## 2025-01-02 RX ADMIN — CIPROFLOXACIN HYDROCHLORIDE 200 MILLIGRAM(S): 500 TABLET, FILM COATED ORAL at 10:02

## 2025-01-02 RX ADMIN — POTASSIUM CHLORIDE 100 MILLIEQUIVALENT(S): 600 TABLET, FILM COATED, EXTENDED RELEASE ORAL at 06:50

## 2025-01-02 RX ADMIN — GABAPENTIN 300 MILLIGRAM(S): 300 CAPSULE ORAL at 20:57

## 2025-01-02 RX ADMIN — PANTOPRAZOLE 40 MILLIGRAM(S): 40 TABLET, DELAYED RELEASE ORAL at 05:40

## 2025-01-02 RX ADMIN — LEVOTHYROXINE SODIUM 75 MICROGRAM(S): 175 TABLET ORAL at 05:40

## 2025-01-02 RX ADMIN — POTASSIUM CHLORIDE 100 MILLIEQUIVALENT(S): 600 TABLET, FILM COATED, EXTENDED RELEASE ORAL at 04:10

## 2025-01-02 RX ADMIN — LOSARTAN POTASSIUM 100 MILLIGRAM(S): 100 TABLET, FILM COATED ORAL at 10:02

## 2025-01-02 RX ADMIN — GABAPENTIN 300 MILLIGRAM(S): 300 CAPSULE ORAL at 05:39

## 2025-01-02 RX ADMIN — POTASSIUM CHLORIDE 40 MILLIEQUIVALENT(S): 600 TABLET, FILM COATED, EXTENDED RELEASE ORAL at 10:02

## 2025-01-02 RX ADMIN — POTASSIUM CHLORIDE 40 MILLIEQUIVALENT(S): 600 TABLET, FILM COATED, EXTENDED RELEASE ORAL at 05:40

## 2025-01-02 RX ADMIN — METRONIDAZOLE 100 MILLIGRAM(S): 250 TABLET ORAL at 11:23

## 2025-01-02 RX ADMIN — DILTIAZEM HYDROCHLORIDE 240 MILLIGRAM(S): 300 CAPSULE, COATED, EXTENDED RELEASE ORAL at 10:02

## 2025-01-02 NOTE — PROGRESS NOTE ADULT - ASSESSMENT
68 y/o F w/ PMH of anxiety, HTN, hypothyroidism, IBS, p/w vomiting    #Colitis   CT Abd notable for wall thickening of segments of the colon, as described, which may be due to inadequate distention. Correlate clinically for colitis. Stable mild prominence of the left intrahepatic biliary ducts. Stable subcentimeter calcified lesion in the right hepatic lobe. Patient feeling better this morning  -FLD started this AM, ADAT  -GI consult; D/C Abx    #Hyponatremia, improving  Likely 2/2 decreased osmolar po intake + diarrhea/ s/p IVF hydration.  - F/u Urine Na, Urine & Serum Osm  - F/u BMP in AM     #Hypokalemia, resolved  - Cont to monitor    #L renal hypodensities on CT  - F/u outpatient     #HTN  - Cont Losartan, Cardizem, hold HCTZ    #Hypothyroidisim  - Cont levothyroxine    #DVT ppx  -SCDs     Dispo: Anticipate D/C in AM if patient is tolerated a diet without any acute complaints and lytes are stable/improved.

## 2025-01-02 NOTE — CONSULT NOTE ADULT - SUBJECTIVE AND OBJECTIVE BOX
HPI:  70 y/o F w/ PMH of anxiety, HTN, hypothyroidism, IBS, p/w vomiting. Patient states that symptoms first started on 12/28 w/ diarrhea. She had about 2-3 episodes of diarrhea, and took immodium, and diarrhea resolved. States that she also developed vomiting about 3-4 times per day and some mild abdominal discomfort, but no pain. States she has not had any vomiting today, and last time she vomited was yesterday when she had 2 episodes. Patient c/o feeling hot and cold chills, but denies fever.     PSH: lumbar surgery, tosillectomy     Social Hx: Denies tobacco / drugs, etoh - occasional     Family Hx: Denies pertinent hx  (01 Jan 2025 18:00)  ---------  Feeling much better now, no furhter N/V or diarrhea    PAST MEDICAL & SURGICAL HISTORY:  HTN (hypertension)      Hypothyroidism      Anxiety      IBS (irritable bowel syndrome)          Home Medications:  gabapentin 300 mg oral capsule: 1 cap(s) orally every 8 hours (01 Jan 2025 18:05)  losartan-hydroCHLOROthiazide 100 mg-25 mg oral tablet: 1 tab(s) orally once a day (01 Jan 2025 18:05)  Matzim  mg/24 hours oral tablet, extended release: 1 tab(s) orally once a day (01 Jan 2025 18:05)  NexIUM 40 mg oral delayed release capsule: 1 cap(s) orally 2 times a day (01 Jan 2025 18:09)  Restasis 0.05% ophthalmic emulsion: 1 drop(s) in each affected eye every 12 hours (01 Jan 2025 18:11)  Synthroid 75 mcg (0.075 mg) oral tablet: 1 tab(s) orally once a day (01 Jan 2025 18:11)  Xanax 0.5 mg oral tablet: 1 tab(s) orally once a day as needed for (01 Jan 2025 18:11)      MEDICATIONS  (STANDING):  ciprofloxacin   IVPB 400 milliGRAM(s) IV Intermittent every 12 hours  diltiazem    milliGRAM(s) Oral daily  gabapentin 300 milliGRAM(s) Oral every 8 hours  levothyroxine 75 MICROGram(s) Oral daily  losartan 100 milliGRAM(s) Oral daily  metroNIDAZOLE  IVPB 500 milliGRAM(s) IV Intermittent every 12 hours  pantoprazole    Tablet 40 milliGRAM(s) Oral two times a day    MEDICATIONS  (PRN):  ALPRAZolam 0.5 milliGRAM(s) Oral daily PRN anxiety      Allergies    codeine (Unknown)  barbiturates (Unknown)    Intolerances        SOCIAL HISTORY:    FAMILY HISTORY:      ROS  As above  Otherwise unremarkable    Vital Signs Last 24 Hrs  T(C): 36.8 (02 Jan 2025 07:33), Max: 37.1 (01 Jan 2025 15:07)  T(F): 98.2 (02 Jan 2025 07:33), Max: 98.7 (01 Jan 2025 15:07)  HR: 76 (02 Jan 2025 09:54) (76 - 96)  BP: 132/77 (02 Jan 2025 09:54) (132/77 - 155/93)  BP(mean): 94 (01 Jan 2025 15:07) (94 - 94)  RR: 18 (02 Jan 2025 07:33) (18 - 18)  SpO2: 96% (02 Jan 2025 07:33) (96% - 100%)    Parameters below as of 02 Jan 2025 07:33  Patient On (Oxygen Delivery Method): room air        Constitutional: NAD, well-developed  Respiratory: CTAB  Cardiovascular: S1 and S2, RRR  Gastrointestinal: BS+, soft, NT/ND  Extremities: No peripheral edema  Psychiatric: Normal mood, normal affect  Skin: No rashes    LABS:                        11.6   5.88  )-----------( 246      ( 02 Jan 2025 06:31 )             33.8     01-02    126[L]  |  98  |  6[L]  ----------------------------<  95  3.5   |  21[L]  |  0.42[L]    Ca    7.9[L]      02 Jan 2025 06:31  Mg     1.8     01-01    TPro  5.6[L]  /  Alb  3.1[L]  /  TBili  0.5  /  DBili  x   /  AST  22  /  ALT  23  /  AlkPhos  32[L]  01-02      LIVER FUNCTIONS - ( 02 Jan 2025 06:31 )  Alb: 3.1 g/dL / Pro: 5.6 gm/dL / ALK PHOS: 32 U/L / ALT: 23 U/L / AST: 22 U/L / GGT: x             RADIOLOGY & ADDITIONAL STUDIES:

## 2025-01-03 ENCOUNTER — TRANSCRIPTION ENCOUNTER (OUTPATIENT)
Age: 70
End: 2025-01-03

## 2025-01-03 VITALS
OXYGEN SATURATION: 99 % | TEMPERATURE: 98 F | SYSTOLIC BLOOD PRESSURE: 126 MMHG | HEART RATE: 79 BPM | RESPIRATION RATE: 18 BRPM | DIASTOLIC BLOOD PRESSURE: 84 MMHG

## 2025-01-03 LAB
ANION GAP SERPL CALC-SCNC: 2 MMOL/L — LOW (ref 5–17)
BUN SERPL-MCNC: 8 MG/DL — SIGNIFICANT CHANGE UP (ref 7–23)
CALCIUM SERPL-MCNC: 8.3 MG/DL — LOW (ref 8.5–10.1)
CHLORIDE SERPL-SCNC: 101 MMOL/L — SIGNIFICANT CHANGE UP (ref 96–108)
CO2 SERPL-SCNC: 26 MMOL/L — SIGNIFICANT CHANGE UP (ref 22–31)
CREAT SERPL-MCNC: 0.49 MG/DL — LOW (ref 0.5–1.3)
EGFR: 101 ML/MIN/1.73M2 — SIGNIFICANT CHANGE UP
GLUCOSE SERPL-MCNC: 89 MG/DL — SIGNIFICANT CHANGE UP (ref 70–99)
HCT VFR BLD CALC: 34.2 % — LOW (ref 34.5–45)
HGB BLD-MCNC: 11.5 G/DL — SIGNIFICANT CHANGE UP (ref 11.5–15.5)
MCHC RBC-ENTMCNC: 28.7 PG — SIGNIFICANT CHANGE UP (ref 27–34)
MCHC RBC-ENTMCNC: 33.6 G/DL — SIGNIFICANT CHANGE UP (ref 32–36)
MCV RBC AUTO: 85.3 FL — SIGNIFICANT CHANGE UP (ref 80–100)
PLATELET # BLD AUTO: 239 K/UL — SIGNIFICANT CHANGE UP (ref 150–400)
POTASSIUM SERPL-MCNC: 3.9 MMOL/L — SIGNIFICANT CHANGE UP (ref 3.5–5.3)
POTASSIUM SERPL-SCNC: 3.9 MMOL/L — SIGNIFICANT CHANGE UP (ref 3.5–5.3)
RBC # BLD: 4.01 M/UL — SIGNIFICANT CHANGE UP (ref 3.8–5.2)
RBC # FLD: 13.2 % — SIGNIFICANT CHANGE UP (ref 10.3–14.5)
SODIUM SERPL-SCNC: 129 MMOL/L — LOW (ref 135–145)
WBC # BLD: 5.91 K/UL — SIGNIFICANT CHANGE UP (ref 3.8–10.5)
WBC # FLD AUTO: 5.91 K/UL — SIGNIFICANT CHANGE UP (ref 3.8–10.5)

## 2025-01-03 PROCEDURE — 99239 HOSP IP/OBS DSCHRG MGMT >30: CPT

## 2025-01-03 RX ADMIN — DILTIAZEM HYDROCHLORIDE 240 MILLIGRAM(S): 300 CAPSULE, COATED, EXTENDED RELEASE ORAL at 09:20

## 2025-01-03 RX ADMIN — GABAPENTIN 300 MILLIGRAM(S): 300 CAPSULE ORAL at 06:08

## 2025-01-03 RX ADMIN — LOSARTAN POTASSIUM 100 MILLIGRAM(S): 100 TABLET, FILM COATED ORAL at 09:20

## 2025-01-03 RX ADMIN — LEVOTHYROXINE SODIUM 75 MICROGRAM(S): 175 TABLET ORAL at 06:08

## 2025-01-03 NOTE — DISCHARGE NOTE PROVIDER - HOSPITAL COURSE
68 y/o F w/ PMH of anxiety, HTN, hypothyroidism, IBS, p/w vomiting    #Colitis   CT Abd notable for wall thickening of segments of the colon, as described, which may be due to inadequate distention. Correlate clinically for colitis. Stable mild prominence of the left intrahepatic biliary ducts. Stable subcentimeter calcified lesion in the right hepatic lobe. Patient feeling better at this time and tolerating a diet without any acute complaints.  -GI consult; D/C Abx-- f/u as outpt    #Hyponatremia, improving  Likely 2/2 decreased osmolar po intake + diarrhea/ s/p IVF hydration. Na improved (122->123->126->129)  - F/u BMP as outpt    #Hypokalemia, resolved  - Cont to monitor    #L renal hypodensities on CT  - F/u outpatient     #HTN  - Cont Losartan/HCTZ, Cardizem,     #Hypothyroidisim  - Cont levothyroxine

## 2025-01-03 NOTE — DISCHARGE NOTE PROVIDER - ATTENDING DISCHARGE PHYSICAL EXAMINATION:
VITALS:  T(F): 97.9 (01-03-25 @ 07:54), Max: 98.4 (01-02-25 @ 20:35)  HR: 79 (01-03-25 @ 07:54) (79 - 80)  BP: 126/84 (01-03-25 @ 07:54) (126/84 - 141/76)  RR: 18 (01-03-25 @ 07:54) (18 - 18)  SpO2: 99% (01-03-25 @ 07:54) (99% - 99%)  Wt(kg): --    I&O's Summary    02 Jan 2025 07:01  -  03 Jan 2025 07:00  --------------------------------------------------------  IN: 0 mL / OUT: 3 mL / NET: -3 mL        CAPILLARY BLOOD GLUCOSE          PHYSICAL EXAM:  Gen: No acute distress  HEENT:  Normocephalic/Atraumatic  CV:  +S1, +S2, regular, no murmurs or rubs  RESP:   lungs clear to auscultation bilaterally, no wheezing, rales, rhonchi  GI:  abdomen soft, non-tender, non-distended  EXT:  no clubbing, no cyanosis, no edema  NEURO:  No focal neurological deficits

## 2025-01-03 NOTE — DISCHARGE NOTE PROVIDER - NSDCMRMEDTOKEN_GEN_ALL_CORE_FT
gabapentin 300 mg oral capsule: 1 cap(s) orally every 8 hours  losartan-hydroCHLOROthiazide 100 mg-25 mg oral tablet: 1 tab(s) orally once a day  Matzim  mg/24 hours oral tablet, extended release: 1 tab(s) orally once a day  NexIUM 40 mg oral delayed release capsule: 1 cap(s) orally 2 times a day  Restasis 0.05% ophthalmic emulsion: 1 drop(s) in each affected eye every 12 hours  Synthroid 75 mcg (0.075 mg) oral tablet: 1 tab(s) orally once a day  Xanax 0.5 mg oral tablet: 1 tab(s) orally once a day as needed for

## 2025-01-03 NOTE — DISCHARGE NOTE NURSING/CASE MANAGEMENT/SOCIAL WORK - FINANCIAL ASSISTANCE
[facility] provides services at a reduced cost to those who are determined to be eligible through [facility]’s financial assistance program. Information regarding [facility]’s financial assistance program can be found by going to [email or web address] or by calling [phone].

## 2025-01-03 NOTE — DISCHARGE NOTE PROVIDER - CARE PROVIDER_API CALL
Scot West  Pulmonary Disease  [address], [address]  [place] [address]  Phone: [phone]  Fax: [phone]  Established Patient  Follow Up Time: 1 week

## 2025-01-03 NOTE — DISCHARGE NOTE NURSING/CASE MANAGEMENT/SOCIAL WORK - PATIENT PORTAL LINK FT
You can access the FollowMyHealth Patient Portal offered by [facility] by registering at the following website: [email or web address]. By joining [employer]’s FollowMyHealth portal, you will also be able to view your health information using other applications (apps) compatible with our system.

## 2025-01-03 NOTE — DISCHARGE NOTE PROVIDER - NSDCCPCAREPLAN_GEN_ALL_CORE_FT
PRINCIPAL DISCHARGE DIAGNOSIS  Diagnosis: Nausea and vomiting  Assessment and Plan of Treatment:       SECONDARY DISCHARGE DIAGNOSES  Diagnosis: Hypokalemia  Assessment and Plan of Treatment:     Diagnosis: Hyponatremia  Assessment and Plan of Treatment:

## 2025-01-07 LAB
CULTURE RESULTS: SIGNIFICANT CHANGE UP
CULTURE RESULTS: SIGNIFICANT CHANGE UP
SPECIMEN SOURCE: SIGNIFICANT CHANGE UP
SPECIMEN SOURCE: SIGNIFICANT CHANGE UP

## 2025-01-09 DIAGNOSIS — Z88.8 ALLERGY STATUS TO OTHER DRUGS, MEDICAMENTS AND BIOLOGICAL SUBSTANCES: ICD-10-CM

## 2025-01-09 DIAGNOSIS — E87.6 HYPOKALEMIA: ICD-10-CM

## 2025-01-09 DIAGNOSIS — Z88.5 ALLERGY STATUS TO NARCOTIC AGENT: ICD-10-CM

## 2025-01-09 DIAGNOSIS — N28.9 DISORDER OF KIDNEY AND URETER, UNSPECIFIED: ICD-10-CM

## 2025-01-09 DIAGNOSIS — Z11.52 ENCOUNTER FOR SCREENING FOR COVID-19: ICD-10-CM

## 2025-01-09 DIAGNOSIS — E03.9 HYPOTHYROIDISM, UNSPECIFIED: ICD-10-CM

## 2025-01-09 DIAGNOSIS — K52.9 NONINFECTIVE GASTROENTERITIS AND COLITIS, UNSPECIFIED: ICD-10-CM

## 2025-01-09 DIAGNOSIS — Z79.890 HORMONE REPLACEMENT THERAPY: ICD-10-CM

## 2025-01-09 DIAGNOSIS — I10 ESSENTIAL (PRIMARY) HYPERTENSION: ICD-10-CM

## 2025-01-09 DIAGNOSIS — E87.1 HYPO-OSMOLALITY AND HYPONATREMIA: ICD-10-CM

## 2025-01-12 ENCOUNTER — EMERGENCY (EMERGENCY)
Facility: HOSPITAL | Age: 70
LOS: 1 days | Discharge: HOME | End: 2025-01-14
Attending: EMERGENCY MEDICINE
Payer: MEDICARE

## 2025-01-12 VITALS
HEART RATE: 74 BPM | WEIGHT: 110.01 LBS | TEMPERATURE: 99 F | SYSTOLIC BLOOD PRESSURE: 146 MMHG | DIASTOLIC BLOOD PRESSURE: 92 MMHG | RESPIRATION RATE: 19 BRPM | OXYGEN SATURATION: 99 %

## 2025-01-12 DIAGNOSIS — I10 ESSENTIAL (PRIMARY) HYPERTENSION: ICD-10-CM

## 2025-01-12 DIAGNOSIS — R53.1 WEAKNESS: ICD-10-CM

## 2025-01-12 DIAGNOSIS — Z88.5 ALLERGY STATUS TO NARCOTIC AGENT: ICD-10-CM

## 2025-01-12 DIAGNOSIS — E03.9 HYPOTHYROIDISM, UNSPECIFIED: ICD-10-CM

## 2025-01-12 DIAGNOSIS — R11.2 NAUSEA WITH VOMITING, UNSPECIFIED: ICD-10-CM

## 2025-01-12 DIAGNOSIS — E83.42 HYPOMAGNESEMIA: ICD-10-CM

## 2025-01-12 DIAGNOSIS — K52.9 NONINFECTIVE GASTROENTERITIS AND COLITIS, UNSPECIFIED: ICD-10-CM

## 2025-01-12 DIAGNOSIS — E87.1 HYPO-OSMOLALITY AND HYPONATREMIA: ICD-10-CM

## 2025-01-12 DIAGNOSIS — K31.9 DISEASE OF STOMACH AND DUODENUM, UNSPECIFIED: ICD-10-CM

## 2025-01-12 DIAGNOSIS — E87.6 HYPOKALEMIA: ICD-10-CM

## 2025-01-12 LAB
ALBUMIN SERPL ELPH-MCNC: 3.5 G/DL — SIGNIFICANT CHANGE UP (ref 3.3–5)
ALP SERPL-CCNC: 37 U/L — LOW (ref 40–120)
ALT FLD-CCNC: 26 U/L — SIGNIFICANT CHANGE UP (ref 12–78)
ANION GAP SERPL CALC-SCNC: 11 MMOL/L — SIGNIFICANT CHANGE UP (ref 5–17)
APPEARANCE UR: CLEAR — SIGNIFICANT CHANGE UP
AST SERPL-CCNC: 14 U/L — LOW (ref 15–37)
BASOPHILS # BLD AUTO: 0.04 K/UL — SIGNIFICANT CHANGE UP (ref 0–0.2)
BASOPHILS NFR BLD AUTO: 0.4 % — SIGNIFICANT CHANGE UP (ref 0–2)
BILIRUB SERPL-MCNC: 0.6 MG/DL — SIGNIFICANT CHANGE UP (ref 0.2–1.2)
BILIRUB UR-MCNC: NEGATIVE — SIGNIFICANT CHANGE UP
BUN SERPL-MCNC: 7 MG/DL — SIGNIFICANT CHANGE UP (ref 7–23)
CALCIUM SERPL-MCNC: 8.8 MG/DL — SIGNIFICANT CHANGE UP (ref 8.5–10.1)
CHLORIDE SERPL-SCNC: 86 MMOL/L — LOW (ref 96–108)
CO2 SERPL-SCNC: 25 MMOL/L — SIGNIFICANT CHANGE UP (ref 22–31)
COLOR SPEC: YELLOW — SIGNIFICANT CHANGE UP
CREAT SERPL-MCNC: 0.53 MG/DL — SIGNIFICANT CHANGE UP (ref 0.5–1.3)
DIFF PNL FLD: ABNORMAL
EGFR: 99 ML/MIN/1.73M2 — SIGNIFICANT CHANGE UP
EOSINOPHIL # BLD AUTO: 0 K/UL — SIGNIFICANT CHANGE UP (ref 0–0.5)
EOSINOPHIL NFR BLD AUTO: 0 % — SIGNIFICANT CHANGE UP (ref 0–6)
FLUAV AG NPH QL: SIGNIFICANT CHANGE UP
FLUBV AG NPH QL: SIGNIFICANT CHANGE UP
GLUCOSE SERPL-MCNC: 119 MG/DL — HIGH (ref 70–99)
GLUCOSE UR QL: NEGATIVE MG/DL — SIGNIFICANT CHANGE UP
HCT VFR BLD CALC: 36.6 % — SIGNIFICANT CHANGE UP (ref 34.5–45)
HGB BLD-MCNC: 12.8 G/DL — SIGNIFICANT CHANGE UP (ref 11.5–15.5)
IMM GRANULOCYTES NFR BLD AUTO: 0.5 % — SIGNIFICANT CHANGE UP (ref 0–0.9)
KETONES UR-MCNC: >=160 MG/DL
LACTATE SERPL-SCNC: 1.3 MMOL/L — SIGNIFICANT CHANGE UP (ref 0.7–2)
LEUKOCYTE ESTERASE UR-ACNC: NEGATIVE — SIGNIFICANT CHANGE UP
LIDOCAIN IGE QN: 20 U/L — SIGNIFICANT CHANGE UP (ref 13–75)
LYMPHOCYTES # BLD AUTO: 0.7 K/UL — LOW (ref 1–3.3)
LYMPHOCYTES # BLD AUTO: 6.6 % — LOW (ref 13–44)
MAGNESIUM SERPL-MCNC: 1.4 MG/DL — LOW (ref 1.6–2.6)
MCHC RBC-ENTMCNC: 28.7 PG — SIGNIFICANT CHANGE UP (ref 27–34)
MCHC RBC-ENTMCNC: 35 G/DL — SIGNIFICANT CHANGE UP (ref 32–36)
MCV RBC AUTO: 82.1 FL — SIGNIFICANT CHANGE UP (ref 80–100)
MONOCYTES # BLD AUTO: 0.55 K/UL — SIGNIFICANT CHANGE UP (ref 0–0.9)
MONOCYTES NFR BLD AUTO: 5.2 % — SIGNIFICANT CHANGE UP (ref 2–14)
NEUTROPHILS # BLD AUTO: 9.21 K/UL — HIGH (ref 1.8–7.4)
NEUTROPHILS NFR BLD AUTO: 87.3 % — HIGH (ref 43–77)
NITRITE UR-MCNC: NEGATIVE — SIGNIFICANT CHANGE UP
PH UR: 6 — SIGNIFICANT CHANGE UP (ref 5–8)
PHOSPHATE SERPL-MCNC: 2.5 MG/DL — SIGNIFICANT CHANGE UP (ref 2.5–4.5)
PLATELET # BLD AUTO: 368 K/UL — SIGNIFICANT CHANGE UP (ref 150–400)
POTASSIUM SERPL-MCNC: 2.7 MMOL/L — CRITICAL LOW (ref 3.5–5.3)
POTASSIUM SERPL-SCNC: 2.7 MMOL/L — CRITICAL LOW (ref 3.5–5.3)
PROT SERPL-MCNC: 6.5 GM/DL — SIGNIFICANT CHANGE UP (ref 6–8.3)
PROT UR-MCNC: SIGNIFICANT CHANGE UP MG/DL
RBC # BLD: 4.46 M/UL — SIGNIFICANT CHANGE UP (ref 3.8–5.2)
RBC # FLD: 12.6 % — SIGNIFICANT CHANGE UP (ref 10.3–14.5)
RSV RNA NPH QL NAA+NON-PROBE: SIGNIFICANT CHANGE UP
SARS-COV-2 RNA SPEC QL NAA+PROBE: SIGNIFICANT CHANGE UP
SODIUM SERPL-SCNC: 122 MMOL/L — LOW (ref 135–145)
SP GR SPEC: 1.01 — SIGNIFICANT CHANGE UP (ref 1–1.03)
UROBILINOGEN FLD QL: 0.2 MG/DL — SIGNIFICANT CHANGE UP (ref 0.2–1)
WBC # BLD: 10.55 K/UL — HIGH (ref 3.8–10.5)
WBC # FLD AUTO: 10.55 K/UL — HIGH (ref 3.8–10.5)

## 2025-01-12 PROCEDURE — 84100 ASSAY OF PHOSPHORUS: CPT

## 2025-01-12 PROCEDURE — 85025 COMPLETE CBC W/AUTO DIFF WBC: CPT

## 2025-01-12 PROCEDURE — 93010 ELECTROCARDIOGRAM REPORT: CPT

## 2025-01-12 PROCEDURE — 83735 ASSAY OF MAGNESIUM: CPT

## 2025-01-12 PROCEDURE — 99285 EMERGENCY DEPT VISIT HI MDM: CPT | Mod: 25

## 2025-01-12 PROCEDURE — 83605 ASSAY OF LACTIC ACID: CPT

## 2025-01-12 PROCEDURE — 99285 EMERGENCY DEPT VISIT HI MDM: CPT

## 2025-01-12 PROCEDURE — 71045 X-RAY EXAM CHEST 1 VIEW: CPT | Mod: 26

## 2025-01-12 PROCEDURE — 96376 TX/PRO/DX INJ SAME DRUG ADON: CPT

## 2025-01-12 PROCEDURE — 96374 THER/PROPH/DIAG INJ IV PUSH: CPT

## 2025-01-12 PROCEDURE — 74177 CT ABD & PELVIS W/CONTRAST: CPT

## 2025-01-12 PROCEDURE — 81001 URINALYSIS AUTO W/SCOPE: CPT

## 2025-01-12 PROCEDURE — 86901 BLOOD TYPING SEROLOGIC RH(D): CPT

## 2025-01-12 PROCEDURE — 86850 RBC ANTIBODY SCREEN: CPT

## 2025-01-12 PROCEDURE — 87040 BLOOD CULTURE FOR BACTERIA: CPT | Mod: 91

## 2025-01-12 PROCEDURE — 36415 COLL VENOUS BLD VENIPUNCTURE: CPT

## 2025-01-12 PROCEDURE — 0241U: CPT

## 2025-01-12 PROCEDURE — 93005 ELECTROCARDIOGRAM TRACING: CPT

## 2025-01-12 PROCEDURE — 71045 X-RAY EXAM CHEST 1 VIEW: CPT

## 2025-01-12 PROCEDURE — 87086 URINE CULTURE/COLONY COUNT: CPT

## 2025-01-12 PROCEDURE — 80048 BASIC METABOLIC PNL TOTAL CA: CPT

## 2025-01-12 PROCEDURE — 99239 HOSP IP/OBS DSCHRG MGMT >30: CPT

## 2025-01-12 PROCEDURE — 80053 COMPREHEN METABOLIC PANEL: CPT

## 2025-01-12 PROCEDURE — 86900 BLOOD TYPING SEROLOGIC ABO: CPT

## 2025-01-12 PROCEDURE — G0378: CPT

## 2025-01-12 PROCEDURE — 96375 TX/PRO/DX INJ NEW DRUG ADDON: CPT

## 2025-01-12 PROCEDURE — 83690 ASSAY OF LIPASE: CPT

## 2025-01-12 PROCEDURE — 93970 EXTREMITY STUDY: CPT

## 2025-01-12 RX ORDER — POTASSIUM CHLORIDE 600 MG/1
10 TABLET, FILM COATED, EXTENDED RELEASE ORAL
Refills: 0 | Status: COMPLETED | OUTPATIENT
Start: 2025-01-12 | End: 2025-01-13

## 2025-01-12 RX ORDER — MAGNESIUM SULFATE 500 MG/ML
1 INJECTION, SOLUTION INTRAMUSCULAR; INTRAVENOUS ONCE
Refills: 0 | Status: COMPLETED | OUTPATIENT
Start: 2025-01-12 | End: 2025-01-12

## 2025-01-12 RX ORDER — ONDANSETRON 4 MG/1
4 TABLET ORAL ONCE
Refills: 0 | Status: COMPLETED | OUTPATIENT
Start: 2025-01-12 | End: 2025-01-12

## 2025-01-12 RX ORDER — POTASSIUM CHLORIDE 600 MG/1
20 TABLET, FILM COATED, EXTENDED RELEASE ORAL ONCE
Refills: 0 | Status: COMPLETED | OUTPATIENT
Start: 2025-01-12 | End: 2025-01-12

## 2025-01-12 RX ORDER — SODIUM CHLORIDE 9 MG/ML
1000 INJECTION, SOLUTION INTRAMUSCULAR; INTRAVENOUS; SUBCUTANEOUS ONCE
Refills: 0 | Status: COMPLETED | OUTPATIENT
Start: 2025-01-12 | End: 2025-01-12

## 2025-01-12 RX ADMIN — SODIUM CHLORIDE 1000 MILLILITER(S): 9 INJECTION, SOLUTION INTRAMUSCULAR; INTRAVENOUS; SUBCUTANEOUS at 22:43

## 2025-01-12 RX ADMIN — ONDANSETRON 4 MILLIGRAM(S): 4 TABLET ORAL at 22:43

## 2025-01-12 NOTE — ED PROVIDER NOTE - CARE PLAN
1 Principal Discharge DX:	Colitis  Secondary Diagnosis:	Intractable nausea and vomiting  Secondary Diagnosis:	Hyponatremia  Secondary Diagnosis:	Hypomagnesemia  Secondary Diagnosis:	Hypokalemia

## 2025-01-12 NOTE — ED PROVIDER NOTE - PROGRESS NOTE DETAILS
JG: Discussed all results with patient.  Patient states she still cannot even tolerate the potassium powder to replace potassium.  So far everything given IV in ED.  With electrolyte disturbance will admit to OBS/Med/Surg with GI consult . Discussed with hospitalist Dr. Hamilton and will also send GI-PCR of stool.

## 2025-01-12 NOTE — ED PROVIDER NOTE - CLINICAL SUMMARY MEDICAL DECISION MAKING FREE TEXT BOX
68 yo F with abdominal pain, chills, nausea, vomiting, persistent intermittent loose stools that began 1.5 wks ago and now returned and worsening over the past 4 days.  EKG, labs, UA, CXR, and CT abd/pelv planned.  Will aggressively hydrate, give meds IV for nausea and pain control.

## 2025-01-12 NOTE — ED PROVIDER NOTE - OBJECTIVE STATEMENT
Pt. is a 70 yo F with hx of IBS, HTN, hypothyroidism, anxiety presents due to abdominal pain and cramping X 4 days, nausea and vomiting X 4 days, loose stools intermittently.  States she was hospitalized 1.5 weeks ago for GI illness with electrolyte disturbance, and states symptoms never really improved after being discharged last weekend.  She has chills.  Is taking zofran as prescribed for nausea and vomiting but not having any relief. Denies bloody or black stools or vomit. Denies chest pain or SOB.  Feels weak and dehydrated. PMD: Brett

## 2025-01-12 NOTE — ED PROVIDER NOTE - IV ALTEPLASE ADMIN OUTSIDE HIDDEN
-c/w lovenox therapy for pulmonary embolism.  -Monitor H&H.  -c/w nasal canula oxygen therapy for goal O2 >90%  -Doubt this is true warfarin failure if patient skipped dose and is subtherapeutic on INR.  -No headaches or suggestive findings for neuroimaging prior to anticoagulation   -At this point no indication for TTE show

## 2025-01-12 NOTE — ED PROVIDER NOTE - ENMT, MLM
Airway patent, Nasal mucosa clear. Mouth with dry  mucosa. Throat has no vesicles, no oropharyngeal exudates and uvula is midline.
2 seconds or less

## 2025-01-12 NOTE — ED PROVIDER NOTE - CROS ED ROS STATEMENT
all other ROS negative except as per HPI Please follow up with your pediatrician in the next 1-3 days     Acute Nausea and Vomiting    WHAT YOU NEED TO KNOW:  Acute nausea and vomiting start suddenly, worsen quickly, and last a short time.    DISCHARGE INSTRUCTIONS:    Return to the emergency department if:   You see blood in your vomit or your bowel movements.  You have sudden, severe pain in your chest and upper abdomen after hard vomiting or retching.  You have swelling in your neck and chest.   You are dizzy, cold, and thirsty and your eyes and mouth are dry.  You are urinating very little or not at all.  You have muscle weakness, leg cramps, and trouble breathing.   Your heart is beating much faster than normal.       You continue to vomit for more than 48 hours.     Contact your healthcare provider if:   You have frequent dry heaves (vomiting but nothing comes out).  Your nausea and vomiting does not get better or go away after you use medicine.  You have questions or concerns about your condition or treatment.    Medicines: You may need any of the following:   Medicines may be given to calm your stomach and stop your vomiting. You may also need medicines to help you feel more relaxed or to stop nausea and vomiting caused by motion sickness.  Gastrointestinal stimulants are used to help empty your stomach and bowels. This may help decrease nausea and vomiting.  Take your medicine as directed. Contact your healthcare provider if you think your medicine is not helping or if you have side effects. Tell him or her if you are allergic to any medicine. Keep a list of the medicines, vitamins, and herbs you take. Include the amounts, and when and why you take them. Bring the list or the pill bottles to follow-up visits. Carry your medicine list with you in case of an emergency.    Prevent or manage acute nausea and vomiting:   Do not drink alcohol. Alcohol may upset or irritate your stomach. Too much alcohol can also cause acute nausea and vomiting.  Control stress. Headaches due to stress may cause nausea and vomiting. Find ways to relax and manage your stress. Get more rest and sleep  Drink more liquids as directed. Vomiting can lead to dehydration. It is important to drink more liquids to help replace lost body fluids. Ask your healthcare provider how much liquid to drink each day and which liquids are best for you. Your provider may recommend that you drink an oral rehydration solution (ORS). ORS contains water, salts, and sugar that are needed to replace the lost body fluids. Ask what kind of ORS to use, how much to drink, and where to get it.  Eat smaller meals, more often. Eat small amounts of food every 2 to 3 hours, even if you are not hungry. Food in your stomach may decrease your nausea.  Talk to your healthcare provider before you take over-the-counter (OTC) medicines. These medicines can cause serious problems if you use certain other medicines, or you have a medical condition. You may have problems if you use too much or use them for longer than the label says. Follow directions on the label carefully.     Follow up with your healthcare provider as directed: Write down your questions so you remember to ask them during your follow-up visits.

## 2025-01-12 NOTE — ED ADULT TRIAGE NOTE - CHIEF COMPLAINT QUOTE
pt BIBEMS from home C/O multiple episodes of vomiting, abdominal pain and chills. pt endorses generalized fatigue. denies SOB, chest discomfort.

## 2025-01-13 DIAGNOSIS — E87.6 HYPOKALEMIA: ICD-10-CM

## 2025-01-13 DIAGNOSIS — E83.42 HYPOMAGNESEMIA: ICD-10-CM

## 2025-01-13 DIAGNOSIS — E03.9 HYPOTHYROIDISM, UNSPECIFIED: ICD-10-CM

## 2025-01-13 DIAGNOSIS — K52.9 NONINFECTIVE GASTROENTERITIS AND COLITIS, UNSPECIFIED: ICD-10-CM

## 2025-01-13 DIAGNOSIS — I10 ESSENTIAL (PRIMARY) HYPERTENSION: ICD-10-CM

## 2025-01-13 DIAGNOSIS — Z29.9 ENCOUNTER FOR PROPHYLACTIC MEASURES, UNSPECIFIED: ICD-10-CM

## 2025-01-13 LAB
ALBUMIN SERPL ELPH-MCNC: 3 G/DL — LOW (ref 3.3–5)
ALP SERPL-CCNC: 33 U/L — LOW (ref 40–120)
ALT FLD-CCNC: 21 U/L — SIGNIFICANT CHANGE UP (ref 12–78)
ANION GAP SERPL CALC-SCNC: 6 MMOL/L — SIGNIFICANT CHANGE UP (ref 5–17)
ANION GAP SERPL CALC-SCNC: 6 MMOL/L — SIGNIFICANT CHANGE UP (ref 5–17)
AST SERPL-CCNC: 11 U/L — LOW (ref 15–37)
BACTERIA # UR AUTO: NEGATIVE /HPF — SIGNIFICANT CHANGE UP
BASOPHILS # BLD AUTO: 0.02 K/UL — SIGNIFICANT CHANGE UP (ref 0–0.2)
BASOPHILS NFR BLD AUTO: 0.3 % — SIGNIFICANT CHANGE UP (ref 0–2)
BILIRUB SERPL-MCNC: 0.4 MG/DL — SIGNIFICANT CHANGE UP (ref 0.2–1.2)
BLD GP AB SCN SERPL QL: SIGNIFICANT CHANGE UP
BUN SERPL-MCNC: 4 MG/DL — LOW (ref 7–23)
BUN SERPL-MCNC: 5 MG/DL — LOW (ref 7–23)
CALCIUM SERPL-MCNC: 7.7 MG/DL — LOW (ref 8.5–10.1)
CALCIUM SERPL-MCNC: 7.7 MG/DL — LOW (ref 8.5–10.1)
CAST: 0 /LPF — SIGNIFICANT CHANGE UP (ref 0–4)
CHLORIDE SERPL-SCNC: 101 MMOL/L — SIGNIFICANT CHANGE UP (ref 96–108)
CHLORIDE SERPL-SCNC: 101 MMOL/L — SIGNIFICANT CHANGE UP (ref 96–108)
CO2 SERPL-SCNC: 25 MMOL/L — SIGNIFICANT CHANGE UP (ref 22–31)
CO2 SERPL-SCNC: 25 MMOL/L — SIGNIFICANT CHANGE UP (ref 22–31)
CREAT SERPL-MCNC: 0.52 MG/DL — SIGNIFICANT CHANGE UP (ref 0.5–1.3)
CREAT SERPL-MCNC: 0.52 MG/DL — SIGNIFICANT CHANGE UP (ref 0.5–1.3)
EGFR: 100 ML/MIN/1.73M2 — SIGNIFICANT CHANGE UP
EGFR: 100 ML/MIN/1.73M2 — SIGNIFICANT CHANGE UP
EOSINOPHIL # BLD AUTO: 0.04 K/UL — SIGNIFICANT CHANGE UP (ref 0–0.5)
EOSINOPHIL NFR BLD AUTO: 0.7 % — SIGNIFICANT CHANGE UP (ref 0–6)
GLUCOSE SERPL-MCNC: 111 MG/DL — HIGH (ref 70–99)
GLUCOSE SERPL-MCNC: 89 MG/DL — SIGNIFICANT CHANGE UP (ref 70–99)
HCT VFR BLD CALC: 33.1 % — LOW (ref 34.5–45)
HGB BLD-MCNC: 11.2 G/DL — LOW (ref 11.5–15.5)
IMM GRANULOCYTES NFR BLD AUTO: 0.3 % — SIGNIFICANT CHANGE UP (ref 0–0.9)
LYMPHOCYTES # BLD AUTO: 0.96 K/UL — LOW (ref 1–3.3)
LYMPHOCYTES # BLD AUTO: 15.7 % — SIGNIFICANT CHANGE UP (ref 13–44)
MAGNESIUM SERPL-MCNC: 1.7 MG/DL — SIGNIFICANT CHANGE UP (ref 1.6–2.6)
MAGNESIUM SERPL-MCNC: 1.7 MG/DL — SIGNIFICANT CHANGE UP (ref 1.6–2.6)
MCHC RBC-ENTMCNC: 28.7 PG — SIGNIFICANT CHANGE UP (ref 27–34)
MCHC RBC-ENTMCNC: 33.8 G/DL — SIGNIFICANT CHANGE UP (ref 32–36)
MCV RBC AUTO: 84.9 FL — SIGNIFICANT CHANGE UP (ref 80–100)
MONOCYTES # BLD AUTO: 0.67 K/UL — SIGNIFICANT CHANGE UP (ref 0–0.9)
MONOCYTES NFR BLD AUTO: 11 % — SIGNIFICANT CHANGE UP (ref 2–14)
NEUTROPHILS # BLD AUTO: 4.4 K/UL — SIGNIFICANT CHANGE UP (ref 1.8–7.4)
NEUTROPHILS NFR BLD AUTO: 72 % — SIGNIFICANT CHANGE UP (ref 43–77)
PHOSPHATE SERPL-MCNC: 1.5 MG/DL — LOW (ref 2.5–4.5)
PLATELET # BLD AUTO: 341 K/UL — SIGNIFICANT CHANGE UP (ref 150–400)
POTASSIUM SERPL-MCNC: 3.3 MMOL/L — LOW (ref 3.5–5.3)
POTASSIUM SERPL-MCNC: 3.3 MMOL/L — LOW (ref 3.5–5.3)
POTASSIUM SERPL-SCNC: 3.3 MMOL/L — LOW (ref 3.5–5.3)
POTASSIUM SERPL-SCNC: 3.3 MMOL/L — LOW (ref 3.5–5.3)
PROT SERPL-MCNC: 5.5 GM/DL — LOW (ref 6–8.3)
RBC # BLD: 3.9 M/UL — SIGNIFICANT CHANGE UP (ref 3.8–5.2)
RBC # FLD: 13.1 % — SIGNIFICANT CHANGE UP (ref 10.3–14.5)
RBC CASTS # UR COMP ASSIST: 36 /HPF — HIGH (ref 0–4)
SODIUM SERPL-SCNC: 132 MMOL/L — LOW (ref 135–145)
SODIUM SERPL-SCNC: 132 MMOL/L — LOW (ref 135–145)
SQUAMOUS # UR AUTO: 1 /HPF — SIGNIFICANT CHANGE UP (ref 0–5)
WBC # BLD: 6.11 K/UL — SIGNIFICANT CHANGE UP (ref 3.8–10.5)
WBC # FLD AUTO: 6.11 K/UL — SIGNIFICANT CHANGE UP (ref 3.8–10.5)
WBC UR QL: 1 /HPF — SIGNIFICANT CHANGE UP (ref 0–5)

## 2025-01-13 PROCEDURE — 74177 CT ABD & PELVIS W/CONTRAST: CPT | Mod: 26

## 2025-01-13 PROCEDURE — 99222 1ST HOSP IP/OBS MODERATE 55: CPT

## 2025-01-13 PROCEDURE — 93970 EXTREMITY STUDY: CPT | Mod: 26

## 2025-01-13 RX ORDER — ALPRAZOLAM 0.25 MG/1
1 TABLET ORAL
Refills: 0 | DISCHARGE

## 2025-01-13 RX ORDER — GINKGO BILOBA 40 MG
3 CAPSULE ORAL AT BEDTIME
Refills: 0 | Status: DISCONTINUED | OUTPATIENT
Start: 2025-01-13 | End: 2025-01-14

## 2025-01-13 RX ORDER — PIPERACILLIN AND TAZOBACTAM 3; .375 G/15ML; G/15ML
3.38 INJECTION, POWDER, LYOPHILIZED, FOR SOLUTION INTRAVENOUS ONCE
Refills: 0 | Status: COMPLETED | OUTPATIENT
Start: 2025-01-13 | End: 2025-01-13

## 2025-01-13 RX ORDER — ALPRAZOLAM 0.25 MG/1
0.5 TABLET ORAL DAILY
Refills: 0 | Status: DISCONTINUED | OUTPATIENT
Start: 2025-01-13 | End: 2025-01-14

## 2025-01-13 RX ORDER — LOSARTAN POTASSIUM AND HYDROCHLOROTHIAZIDE 100; 25 MG/1; MG/1
1 TABLET, FILM COATED ORAL
Refills: 0 | DISCHARGE

## 2025-01-13 RX ORDER — GABAPENTIN 300 MG/1
300 CAPSULE ORAL EVERY 8 HOURS
Refills: 0 | Status: DISCONTINUED | OUTPATIENT
Start: 2025-01-13 | End: 2025-01-14

## 2025-01-13 RX ORDER — POLYSORBATE 80 100 MG/10ML
1 SOLUTION/ DROPS OPHTHALMIC
Refills: 0 | Status: DISCONTINUED | OUTPATIENT
Start: 2025-01-13 | End: 2025-01-14

## 2025-01-13 RX ORDER — POTASSIUM CHLORIDE 600 MG/1
40 TABLET, FILM COATED, EXTENDED RELEASE ORAL EVERY 4 HOURS
Refills: 0 | Status: COMPLETED | OUTPATIENT
Start: 2025-01-13 | End: 2025-01-13

## 2025-01-13 RX ORDER — SODIUM CHLORIDE 9 MG/ML
1000 INJECTION, SOLUTION INTRAVENOUS
Refills: 0 | Status: DISCONTINUED | OUTPATIENT
Start: 2025-01-13 | End: 2025-01-13

## 2025-01-13 RX ORDER — HEPARIN SODIUM 1000 [USP'U]/ML
5000 INJECTION, SOLUTION INTRAVENOUS; SUBCUTANEOUS EVERY 12 HOURS
Refills: 0 | Status: DISCONTINUED | OUTPATIENT
Start: 2025-01-13 | End: 2025-01-14

## 2025-01-13 RX ORDER — SOD PHOS DI, MONO/K PHOS MONO 250 MG
1 TABLET ORAL ONCE
Refills: 0 | Status: COMPLETED | OUTPATIENT
Start: 2025-01-13 | End: 2025-01-13

## 2025-01-13 RX ORDER — ONDANSETRON 4 MG/1
4 TABLET ORAL EVERY 6 HOURS
Refills: 0 | Status: DISCONTINUED | OUTPATIENT
Start: 2025-01-13 | End: 2025-01-14

## 2025-01-13 RX ORDER — ESOMEPRAZOLE SODIUM 40 MG/1
1 INJECTION, POWDER, LYOPHILIZED, FOR SOLUTION INTRAVENOUS
Refills: 0 | DISCHARGE

## 2025-01-13 RX ORDER — MAG HYDROX/ALUMINUM HYD/SIMETH 200-200-20
30 SUSPENSION, ORAL (FINAL DOSE FORM) ORAL EVERY 4 HOURS
Refills: 0 | Status: DISCONTINUED | OUTPATIENT
Start: 2025-01-13 | End: 2025-01-14

## 2025-01-13 RX ORDER — LEVOTHYROXINE SODIUM 175 UG/1
75 TABLET ORAL DAILY
Refills: 0 | Status: DISCONTINUED | OUTPATIENT
Start: 2025-01-13 | End: 2025-01-14

## 2025-01-13 RX ORDER — GABAPENTIN 300 MG/1
1 CAPSULE ORAL
Refills: 0 | DISCHARGE

## 2025-01-13 RX ORDER — ACETAMINOPHEN 80 MG/.8ML
650 SOLUTION/ DROPS ORAL EVERY 6 HOURS
Refills: 0 | Status: DISCONTINUED | OUTPATIENT
Start: 2025-01-13 | End: 2025-01-14

## 2025-01-13 RX ORDER — DILTIAZEM HYDROCHLORIDE 300 MG/1
1 CAPSULE, COATED, EXTENDED RELEASE ORAL
Refills: 0 | DISCHARGE

## 2025-01-13 RX ORDER — LOSARTAN POTASSIUM 100 MG/1
100 TABLET, FILM COATED ORAL DAILY
Refills: 0 | Status: DISCONTINUED | OUTPATIENT
Start: 2025-01-13 | End: 2025-01-14

## 2025-01-13 RX ORDER — CYCLOSPORINE 0.5 MG/ML
1 EMULSION OPHTHALMIC
Refills: 0 | DISCHARGE

## 2025-01-13 RX ORDER — PIPERACILLIN AND TAZOBACTAM 3; .375 G/15ML; G/15ML
3.38 INJECTION, POWDER, LYOPHILIZED, FOR SOLUTION INTRAVENOUS EVERY 8 HOURS
Refills: 0 | Status: DISCONTINUED | OUTPATIENT
Start: 2025-01-13 | End: 2025-01-13

## 2025-01-13 RX ORDER — LEVOTHYROXINE SODIUM 175 UG/1
1 TABLET ORAL
Refills: 0 | DISCHARGE

## 2025-01-13 RX ORDER — SODIUM CHLORIDE 9 MG/ML
1000 INJECTION, SOLUTION INTRAMUSCULAR; INTRAVENOUS; SUBCUTANEOUS
Refills: 0 | Status: DISCONTINUED | OUTPATIENT
Start: 2025-01-13 | End: 2025-01-13

## 2025-01-13 RX ADMIN — POTASSIUM CHLORIDE 100 MILLIEQUIVALENT(S): 600 TABLET, FILM COATED, EXTENDED RELEASE ORAL at 05:15

## 2025-01-13 RX ADMIN — POLYSORBATE 80 1 DROP(S): 100 SOLUTION/ DROPS OPHTHALMIC at 18:49

## 2025-01-13 RX ADMIN — PIPERACILLIN AND TAZOBACTAM 25 GRAM(S): 3; .375 INJECTION, POWDER, LYOPHILIZED, FOR SOLUTION INTRAVENOUS at 13:44

## 2025-01-13 RX ADMIN — MAGNESIUM SULFATE 100 GRAM(S): 500 INJECTION, SOLUTION INTRAMUSCULAR; INTRAVENOUS at 00:09

## 2025-01-13 RX ADMIN — POTASSIUM CHLORIDE 100 MILLIEQUIVALENT(S): 600 TABLET, FILM COATED, EXTENDED RELEASE ORAL at 00:07

## 2025-01-13 RX ADMIN — SODIUM CHLORIDE 1000 MILLILITER(S): 9 INJECTION, SOLUTION INTRAMUSCULAR; INTRAVENOUS; SUBCUTANEOUS at 00:08

## 2025-01-13 RX ADMIN — GABAPENTIN 300 MILLIGRAM(S): 300 CAPSULE ORAL at 21:11

## 2025-01-13 RX ADMIN — LEVOTHYROXINE SODIUM 75 MICROGRAM(S): 175 TABLET ORAL at 07:23

## 2025-01-13 RX ADMIN — Medication 1 PACKET(S): at 13:44

## 2025-01-13 RX ADMIN — Medication 3 MILLIGRAM(S): at 21:10

## 2025-01-13 RX ADMIN — POTASSIUM CHLORIDE 20 MILLIEQUIVALENT(S): 600 TABLET, FILM COATED, EXTENDED RELEASE ORAL at 00:16

## 2025-01-13 RX ADMIN — LOSARTAN POTASSIUM 100 MILLIGRAM(S): 100 TABLET, FILM COATED ORAL at 10:37

## 2025-01-13 RX ADMIN — PIPERACILLIN AND TAZOBACTAM 200 GRAM(S): 3; .375 INJECTION, POWDER, LYOPHILIZED, FOR SOLUTION INTRAVENOUS at 01:48

## 2025-01-13 RX ADMIN — SODIUM CHLORIDE 75 MILLILITER(S): 9 INJECTION, SOLUTION INTRAVENOUS at 10:38

## 2025-01-13 RX ADMIN — POTASSIUM CHLORIDE 40 MILLIEQUIVALENT(S): 600 TABLET, FILM COATED, EXTENDED RELEASE ORAL at 10:38

## 2025-01-13 RX ADMIN — SODIUM CHLORIDE 75 MILLILITER(S): 9 INJECTION, SOLUTION INTRAVENOUS at 07:23

## 2025-01-13 RX ADMIN — POTASSIUM CHLORIDE 100 MILLIEQUIVALENT(S): 600 TABLET, FILM COATED, EXTENDED RELEASE ORAL at 03:11

## 2025-01-13 NOTE — H&P ADULT - NSHPPHYSICALEXAM_GEN_ALL_CORE
T(C): 37 (01-12-25 @ 23:54), Max: 37.1 (01-12-25 @ 22:03)  HR: 90 (01-12-25 @ 23:54) (74 - 90)  BP: 142/76 (01-12-25 @ 23:54) (142/76 - 146/92)  RR: 18 (01-12-25 @ 23:54) (18 - 19)  SpO2: 100% (01-12-25 @ 23:54) (99% - 100%)    General: non-toxic  HEENT: non-traumatic, perrla, eomi  Cardio: s1s2 regular rate and rhythm  Lungs: comfortable breathing, clear to auscultation  Abdomen: Soft, non-tender, non-distended  Neuro: AOx4  Ext: Pulses +2

## 2025-01-13 NOTE — CONSULT NOTE ADULT - ASSESSMENT
Imp:  Recurrent gastroenteritis symptoms, CT with only non-specific "coliltis"    Rec:  Stool samples  Advance diet  biliary dil is stable for years with normal LFTs, ok to defer MRCP  5 mm gastric lesion -> outpatient f/u
68 y/o Female with h/o IBS, HTN, hypothyroidism, anxiety was admitted on 1/12 for abdominal pain and cramping X 4 days, nausea and vomiting X 4 days, loose stools intermittently. Patient recently admitted from 1/1 - 1/3/25 for diarrhea, treated with cipro and flagyl at the time. Her symptoms improved after hospitalization but did not resolve. Symptoms worsened again for the past several days, reports initially 3 watery BMs a day, yesterday she started taking imodium and the last BM was earlier yesterday. She also had progressive fatigue and chills. Denies bloody or black stools or vomit. In ED VSS, CBC noted for WBC 10.5. UA sterile, flu/COVID negative. CT abdomen suspicious for colitis.    #Diarrheal syndrome.   #Mild diffuse colonic wall thickening suspicious for colitis.  #IBS  -possible infectious vs noninfectious etiology  -symptoms did note resolved with recent abx therapy with cipro and metronidazole  -obtain stool studies including GI PCR and CDT  -would hold off abx therapy for now pending further diagnostic workup  -monitor abdomen  -old chart reviewed to assess prior cultures  -monitor temps  -f/u CBC  -supportive care  2. Other issues:   -care per medicine

## 2025-01-13 NOTE — ED ADULT NURSE NOTE - OBJECTIVE STATEMENT
pt is a 69 year old female presenting to ER for vomiting and diarrhea. Pt with recent hospitalization for vomiting and diarrhea and symptoms improved but started again 4 days ago. Denies fever, denies pain, family members with RSV

## 2025-01-13 NOTE — H&P ADULT - HISTORY OF PRESENT ILLNESS
70 yo F with hx of IBS, HTN, hypothyroidism, anxiety presents due to abdominal pain and cramping X 4 days, nausea and vomiting X 4 days, loose stools intermittently.  States she was hospitalized 1.5 weeks ago for GI illness with electrolyte disturbance, and states symptoms never really improved after being discharged last weekend.  She has chills.  Is taking zofran as prescribed for nausea and vomiting but not having any relief. Denies bloody or black stools or vomit. Denies chest pain or SOB.  Feels weak and dehydrated. PMD: Brett 70 yo F with hx of IBS, HTN, hypothyroidism, anxiety presents due to abdominal pain and cramping X 4 days, nausea and vomiting X 4 days, loose stools intermittently. Patient recently admitted from 1/1 - 1/3/25 for diarrhea, treated with cipro and flagyl at the time, Symptoms improved after hospitalization but did not resolve. Symptoms worsened again for the past several days and came to ED. She has chills.  Is taking zofran as prescribed for nausea and vomiting but not having any relief. Denies bloody or black stools or vomit. Denies chest pain or SOB.  Feels weak and dehydrated. PMD: Vomero 68 yo F with hx of IBS, HTN, hypothyroidism, anxiety presents due to abdominal pain and cramping X 4 days, nausea and vomiting X 4 days, loose stools intermittently. Patient recently admitted from 1/1 - 1/3/25 for diarrhea, treated with cipro and flagyl at the time, Symptoms improved after hospitalization but did not resolve. Symptoms worsened again for the past several days, reports initially 3 watery BMs a day, yesterday she started taking imodium and the last BM was earlier yesterday. She also c/o progressive fatigue and chills. Denies bloody or black stools or vomit. Denies chest pain or SOB. In ED VSS, CBC noted for WBC 10.5, H/H12/36, Plt 368, CMP noted fir Na 122, K 2.7, Cl 86, Mag 1.4. UA sterile, flu/COVID negative. CT abdomen suspicious for colitis, see full study below. US dopplers neg for DVT. Patient admitted for colitis.

## 2025-01-13 NOTE — ED ADULT NURSE NOTE - COVID-19 ORDERING FACILITY
[facility] Progress Note  Patient: Rosana Mendes  39 y o  male   MRN: [id number]  PCP: Goran Almanza MD  [facility] Encounter: [id number]  Date Of Visit: 09/30/17    Assessment  Principal Problem:    Penile abscess  Active Problems:    Paraplegia    Atrial fibrillation    Chronic suprapubic catheter    Colostomy care    S/P unilateral BKA (below knee amputation)    Tobacco abuse    Type 1 diabetes mellitus    Ulcer of sacral region, stage 4    Anemia    Sepsis    Decubitus ulcers    HTN (hypertension), benign    LEONILA (acute kidney injury)    Stage 3 chronic kidney disease    Thrombocytosis    Asymptomatic bacteriuria    History of Clostridium difficile infection    Urinary retention    Delirium    Noncompliance by refusing intervention or support  Resolved Problems:    * No resolved hospital problems  *        Plan  1  Lactobacillus sepsis  Secondary to penile abscess and possible gangrene  Cardio cefdinir metronidazole per Infectious Diseases as patient declining further surgery  2  Penile cellulitis/abscess  Still refusing surgical intervention  Wishes to follow up with a urologist as an outpatient for second opinion  Agreeable to oral antibiotics  Patient seen by neuropsychology and competent to make medical necessary decisions  Mother wishes to honor the patient's wishes  Anticipate discharge home after weekend if stable with oral antibiotics and close follow-up with urologist of patient's choice  3  Acute kidney injury on chronic kidney disease stage 3  Secondary to obstructive uropathy  Improved, recheck labs in the morning, continue IV fluids  4  Paroxysmal fibrillation  Stable metoprolol and Eliquis (Xarelto has been held due to acute kidney injury)  5  Paraplegia with history of spinal cord infection/injury  6  Insulin dependent diabetes with hyperglycemia  Continue current dose of glargine and sliding scale  7  History of C diff infection    On oral vancomycin suppression while on antibiotics  VTE Prophylaxis: Apixaban (Eliquis)  Education and Discussions:  Discussed with patient's mother at length tonight  Discharge Plan:    Time Spent for Care:  30 Mins  More than 50% of total time spent on counseling and coordination of care as described above    ______________________________________________________________________________    Subjective:   Patient seen and examined  No new complaints  Still refusing penectomy and further surgical debridement    Objective:   Vitals: Blood pressure 161/82, pulse 91, temperature 99 2 °F (37 3 °C), temperature source Tympanic, resp  rate 20, height 6' 4" (1 93 m), weight 68 4 kg (150 lb 12 7 oz), SpO2 100 %      Physical Exam:   General appearance: alert, appears stated age, cooperative and no distress  Head: Normocephalic, without obvious abnormality, atraumatic  Lungs: clear to auscultation bilaterally  Heart: regular rate and rhythm  Abdomen: Soft, nontender, ostomy in place  Back: negative, range of motion normal  Extremities: Status post right BKA  Neurologic: Grossly normal    Additional Data:   Labs:    Results from last 7 days  Lab Units 09/29/17  1148 09/26/17  0531   WBC Thousand/uL 16 42* 13 16*   HEMOGLOBIN g/dL 7 3* 7 7*   HEMATOCRIT % 23 6* 24 9*   MCV fL 83 81*   PLATELETS Thousands/uL 407* 425*       Results from last 7 days  Lab Units 09/29/17  1148 09/26/17  0531   SODIUM mmol/L 139 139   POTASSIUM mmol/L 4 8 4 8   CHLORIDE mmol/L 111* 109*   CO2 mmol/L 19* 19*   ANION GAP mmol/L 9 11   BUN mg/dL 46* 41*   CREATININE mg/dL 2 05* 2 69*   CALCIUM mg/dL 8 2* 8 1*   ALBUMIN g/dL 1 5* 1 3*   BILIRUBIN TOTAL mg/dL 0 13* 0 20   ALK PHOS U/L 75 75   ALT U/L 12 8*   AST U/L 12 10   EGFR ml/min/1 73sq m 46 33   GLUCOSE RANDOM mg/dL 191* 197*                        Results from last 7 days  Lab Units 09/30/17  1625 09/30/17  1119 09/30/17  0736 09/29/17  2058 09/29/17  1546 09/29/17  1129 09/29/17  4198 09/29/17  0109 09/28/17  2053 09/28/17  1623 09/28/17  1114 09/28/17  0815   POC GLUCOSE mg/dl 212* 345* 294* 201* 186* 198* 151* 286* 238* 284* 401* 400*           Results from last 7 days  Lab Units 09/28/17  0531   TSH 3RD GENERATON uIU/mL 0 634   FREE T4 ng/dL 0 91     * I Have Reviewed All Lab Data Listed Above      Cultures:           Imaging:  Imaging Reports Reviewed Today Include:       Scheduled Meds:  apixaban 2 5 mg Oral BID   ascorbic acid 500 mg Oral Daily   baclofen 20 mg Oral BID   cefdinir 300 mg Oral Q12H [address]   famotidine 20 mg Oral BID   ferrous sulfate 162 5 mg Oral TID With Meals   insulin detemir 12 Units Subcutaneous Q12H [address]   insulin lispro 1-6 Units Subcutaneous HS   insulin lispro 2-12 Units Subcutaneous TID AC   insulin lispro 5 Units Subcutaneous TID With Meals   metoprolol tartrate 50 mg Oral Q12H [address]   metroNIDAZOLE 500 mg Oral Q8H [address]   nicotine 1 patch Transdermal Daily   QUEtiapine 25 mg Oral HS   sodium hypochlorite 1 application Topical Daily   vancomycin 125 mg Oral Q12H [address]     Continuous Infusions:  sodium chloride 100 mL/hr Last Rate: 100 mL/hr (09/30/17 1259)     PRN Meds:    acetaminophen    haloperidol lactate    HYDROmorphone    ondansetron     Ole Maria L, DO [facility]

## 2025-01-13 NOTE — H&P ADULT - PROBLEM SELECTOR PLAN 1
-CT Abd Noted above  -S/p Zosyn in ED.   -IVF  -NPO  -GI consult -Patient recently treated for infectious colitis, improved with acute worsening.   -CT Abd Noted above, mild ductal dilation  -S/p Zosyn in ED.   -Will obtain MRCP as recommended by radiology on the CT to further eval CBD dilatation.   -C/w zosyn for now and ID consult placed.  -C/w IVF  -ID and GI consult -Patient recently treated for infectious colitis, improved with acute worsening.   -CT Abd Noted above, mild ductal dilation  -S/p Zosyn in ED.   -Will obtain MRCP as recommended by radiology on the CT to further eval CBD dilatation.   -C/w zosyn for now and ID consult placed, f/u GI PCR.  -C/w IVF  -ID and GI consult

## 2025-01-13 NOTE — CONSULT NOTE ADULT - SUBJECTIVE AND OBJECTIVE BOX
HPI:  70 yo F with hx of IBS, HTN, hypothyroidism, anxiety presents due to abdominal pain and cramping X 4 days, nausea and vomiting X 4 days, loose stools intermittently. Patient recently admitted from 1/1 - 1/3/25 for diarrhea, treated with cipro and flagyl at the time, Symptoms improved after hospitalization but did not resolve. Symptoms worsened again for the past several days, reports initially 3 watery BMs a day, yesterday she started taking imodium and the last BM was earlier yesterday. She also c/o progressive fatigue and chills. Denies bloody or black stools or vomit. Denies chest pain or SOB. In ED VSS, CBC noted for WBC 10.5, H/H12/36, Plt 368, CMP noted fir Na 122, K 2.7, Cl 86, Mag 1.4. UA sterile, flu/COVID negative. CT abdomen suspicious for colitis, see full study below. US dopplers neg for DVT. Patient admitted for colitis.  (13 Jan 2025 04:11)  ----------------  No diarrhea today  No recent abx or travel. No new meds    PAST MEDICAL & SURGICAL HISTORY:  HTN (hypertension)      Hypothyroidism      Anxiety      IBS (irritable bowel syndrome)          Home Medications:  gabapentin 300 mg oral capsule: 1 cap(s) orally every 8 hours (13 Jan 2025 05:22)  losartan-hydroCHLOROthiazide 100 mg-25 mg oral tablet: 1 tab(s) orally once a day (13 Jan 2025 05:22)  Matzim  mg/24 hours oral tablet, extended release: 1 tab(s) orally once a day (13 Jan 2025 05:22)  NexIUM 40 mg oral delayed release capsule: 1 cap(s) orally 2 times a day (13 Jan 2025 05:22)  Restasis 0.05% ophthalmic emulsion: 1 drop(s) in each affected eye every 12 hours (13 Jan 2025 05:22)  Synthroid 75 mcg (0.075 mg) oral tablet: 1 tab(s) orally once a day (13 Jan 2025 05:22)  Xanax 0.5 mg oral tablet: 1 tab(s) orally once a day as needed for (13 Jan 2025 05:22)      MEDICATIONS  (STANDING):  gabapentin 300 milliGRAM(s) Oral every 8 hours  heparin   Injectable 5000 Unit(s) SubCutaneous every 12 hours  levothyroxine 75 MICROGram(s) Oral daily  losartan 100 milliGRAM(s) Oral daily  sodium chloride 0.9%. 1000 milliLiter(s) (75 mL/Hr) IV Continuous <Continuous>    MEDICATIONS  (PRN):  acetaminophen     Tablet .. 650 milliGRAM(s) Oral every 6 hours PRN Mild Pain (1 - 3)  ALPRAZolam 0.5 milliGRAM(s) Oral daily PRN Anxiety  aluminum hydroxide/magnesium hydroxide/simethicone Suspension 30 milliLiter(s) Oral every 4 hours PRN Dyspepsia  melatonin 3 milliGRAM(s) Oral at bedtime PRN Insomnia  ondansetron Injectable 4 milliGRAM(s) IV Push every 6 hours PRN Nausea and/or Vomiting      Allergies    codeine (Unknown)  barbiturates (Unknown)    Intolerances        SOCIAL HISTORY:    FAMILY HISTORY:      ROS  As above  Otherwise unremarkable    Vital Signs Last 24 Hrs  T(C): 36.4 (13 Jan 2025 16:26), Max: 37.1 (12 Jan 2025 22:03)  T(F): 97.6 (13 Jan 2025 16:26), Max: 98.8 (12 Jan 2025 22:03)  HR: 90 (13 Jan 2025 16:26) (74 - 92)  BP: 147/- (13 Jan 2025 16:26) (139/70 - 147/-)  BP(mean): 86 (13 Jan 2025 08:27) (86 - 94)  RR: 18 (13 Jan 2025 16:26) (18 - 19)  SpO2: 99% (13 Jan 2025 16:26) (97% - 100%)    Parameters below as of 13 Jan 2025 16:26  Patient On (Oxygen Delivery Method): room air        Constitutional: NAD, well-developed  Respiratory: CTAB  Cardiovascular: S1 and S2, RRR  Gastrointestinal: BS+, soft, NT/ND  Extremities: No peripheral edema  Psychiatric: Normal mood, normal affect  Skin: No rashes    LABS:                        11.2   6.11  )-----------( 341      ( 13 Jan 2025 07:27 )             33.1     01-13    132[L]  |  101  |  4[L]  ----------------------------<  89  3.3[L]   |  25  |  0.52    Ca    7.7[L]      13 Jan 2025 07:27  Phos  1.5     01-13  Mg     1.7     01-13    TPro  5.5[L]  /  Alb  3.0[L]  /  TBili  0.4  /  DBili  x   /  AST  11[L]  /  ALT  21  /  AlkPhos  33[L]  01-13      LIVER FUNCTIONS - ( 13 Jan 2025 07:27 )  Alb: 3.0 g/dL / Pro: 5.5 gm/dL / ALK PHOS: 33 U/L / ALT: 21 U/L / AST: 11 U/L / GGT: x             RADIOLOGY & ADDITIONAL STUDIES:
Patient is a 69y old  Female who presents with a chief complaint of diarrhea    HPI:  68 y/o Female with h/o IBS, HTN, hypothyroidism, anxiety was admitted on  for abdominal pain and cramping X 4 days, nausea and vomiting X 4 days, loose stools intermittently. Patient recently admitted from  - 1/3/25 for diarrhea, treated with cipro and flagyl at the time. Her symptoms improved after hospitalization but did not resolve. Symptoms worsened again for the past several days, reports initially 3 watery BMs a day, yesterday she started taking imodium and the last BM was earlier yesterday. She also had progressive fatigue and chills. Denies bloody or black stools or vomit. In ED VSS, CBC noted for WBC 10.5. UA sterile, flu/COVID negative. CT abdomen suspicious for colitis.    PMH: as above  PSH: as above  Meds: per reconciliation sheet, noted below  MEDICATIONS  (STANDING):  gabapentin 300 milliGRAM(s) Oral every 8 hours  heparin   Injectable 5000 Unit(s) SubCutaneous every 12 hours  lactated ringers. 1000 milliLiter(s) (75 mL/Hr) IV Continuous <Continuous>  levothyroxine 75 MICROGram(s) Oral daily  losartan 100 milliGRAM(s) Oral daily  piperacillin/tazobactam IVPB.. 3.375 Gram(s) IV Intermittent every 8 hours    MEDICATIONS  (PRN):  acetaminophen     Tablet .. 650 milliGRAM(s) Oral every 6 hours PRN Mild Pain (1 - 3)  ALPRAZolam 0.5 milliGRAM(s) Oral daily PRN Anxiety  aluminum hydroxide/magnesium hydroxide/simethicone Suspension 30 milliLiter(s) Oral every 4 hours PRN Dyspepsia  melatonin 3 milliGRAM(s) Oral at bedtime PRN Insomnia  ondansetron Injectable 4 milliGRAM(s) IV Push every 6 hours PRN Nausea and/or Vomiting    Allergies    codeine (Unknown)  barbiturates (Unknown)    Intolerances    Social: no smoking, no alcohol, no illegal drugs; no recent travel, no exposure to TB  FAMILY HISTORY:    no history of premature cardiovascular disease in first degree relatives    ROS: the patient denies fever, no chills, no HA, no seizures, no dizziness, no sore throat, no nasal congestion, no blurry vision, no CP, no palpitations, no SOB, no cough, no abdominal pain, has diarrhea, no N/V, no dysuria, no leg pain, no claudication, no rash, no joint aches, no rectal pain or bleeding, no night sweats  All other systems reviewed and are negative    Vital Signs Last 24 Hrs  T(C): 36.2 (2025 08:27), Max: 37.1 (2025 22:03)  T(F): 97.2 (2025 08:27), Max: 98.8 (2025 22:03)  HR: 92 (:) (74 - 92)  BP: 139/70 (2025 08:) (139/70 - 146/92)  BP(mean): 86 (2025 08:) (86 - 94)  RR: 18 (:) (18 - 19)  SpO2: 99% (2025 08:) (97% - 100%)    Parameters below as of 2025 08:27  Patient On (Oxygen Delivery Method): room air      Daily     Daily Weight in k (2025 08:27)    PE:    Constitutional:  No acute distress  HEENT: NC/AT, EOMI, PERRLA, conjunctivae clear; ears and nose atraumatic; pharynx benign  Neck: supple; thyroid not palpable  Back: no tenderness  Respiratory: respiratory effort normal; clear to auscultation  Cardiovascular: S1S2 regular, no murmurs  Abdomen: soft, not tender, not distended, positive BS; no liver or spleen organomegaly  Genitourinary: no suprapubic tenderness  Lymphatic: no LN palpable  Musculoskeletal: no muscle tenderness, no joint swelling or tenderness  Extremities: no pedal edema  Neurological/ Psychiatric: AxOx3, judgement and insight normal; moving all extremities  Skin: no rashes; no palpable lesions    Labs: all available labs reviewed                        11.2   6.11  )-----------( 341      ( 2025 07:27 )             33.1     01-    132[L]  |  101  |  4[L]  ----------------------------<  89  3.3[L]   |  25  |  0.52    Ca    7.7[L]      2025 07:27  Phos  1.5       Mg     1.7         TPro  5.5[L]  /  Alb  3.0[L]  /  TBili  0.4  /  DBili  x   /  AST  11[L]  /  ALT  21  /  AlkPhos  33[L]       LIVER FUNCTIONS - ( 2025 07:27 )  Alb: 3.0 g/dL / Pro: 5.5 gm/dL / ALK PHOS: 33 U/L / ALT: 21 U/L / AST: 11 U/L / GGT: x           Urinalysis ( @ 23:34)  Urine Appearance: Clear  Protein, Urine: Trace mg/dL  Urine Microscopic-Add On (NC) ( @ 23:34)  White Blood Cell - Urine: 1 /HPF  Red Blood Cell - Urine: 36 /HPF    Radiology: all available radiological tests reviewed    < from: CT Abdomen and Pelvis w/ IV Cont (25 @ 00:09) >  1. Mild diffuse colonic wall thickening suspicious for colitis.  2. 5 mm polypoid soft tissue nodular density in the proximal stomach.  3. Mixed attenuation in the right femoral vein could reflect mixing artifact but is indeterminate onthis exam. Recommend correlation with right lower extremity DVT ultrasound.  4. Mild intrahepatic ductal dilatation and slight prominence of the CBD appears stable since 2019.   < end of copied text >    < from: US Duplex Venous Lower Ext Complete, Bilateral (25 @ 02:48) >  No evidence of deep venous thrombosis in either lower extremity.  < end of copied text >    < from: Xray Chest 1 View- PORTABLE-Urgent (25 @ 22:47) >  No focal airspace opacities.  < end of copied text >    Advanced directives addressed: full resuscitation

## 2025-01-13 NOTE — PATIENT PROFILE ADULT - NSPROPOAPRESSUREINJURY_GEN_A_NUR
Discontinue Regimen: Efudex 5% topical cream Render In Strict Bullet Format?: No Detail Level: Zone no

## 2025-01-13 NOTE — H&P ADULT - NSHPLABSRESULTS_GEN_ALL_CORE
LABS:  cret                        12.8   10.55 )-----------( 368      ( 12 Jan 2025 22:40 )             36.6     01-12    122[L]  |  86[L]  |  7   ----------------------------<  119[H]  2.7[LL]   |  25  |  0.53    Ca    8.8      12 Jan 2025 22:40  Phos  2.5     01-12  Mg     1.4     01-12    TPro  6.5  /  Alb  3.5  /  TBili  0.6  /  DBili  x   /  AST  14[L]  /  ALT  26  /  AlkPhos  37[L]  01-12    < from: US Duplex Venous Lower Ext Complete, Bilateral (01.13.25 @ 02:48) >    IMPRESSION:  No evidence of deep venous thrombosis in either lower extremity.            --- End of Report ---    < end of copied text >    < from: CT Abdomen and Pelvis w/ IV Cont (01.13.25 @ 00:09) >    IMPRESSION:    1. Mild diffuse colonic wall thickening suspicious for colitis.    2. 5 mm polypoid soft tissue nodular density in the proximal stomach.   Correlation with follow-up endoscopy is recommended.    3. Mixed attenuation in the right femoral vein could reflect mixing   artifact but is indeterminate onthis exam. Recommend correlation with   right lower extremity DVT ultrasound.    4. Mild intrahepatic ductal dilatation and slight prominence of the CBD   appears stable since 2019. Nonurgent MRI/MRCP could be obtained for   further variation.      --- End of Report ---    < end of copied text >

## 2025-01-14 ENCOUNTER — TRANSCRIPTION ENCOUNTER (OUTPATIENT)
Age: 70
End: 2025-01-14

## 2025-01-14 VITALS
OXYGEN SATURATION: 98 % | DIASTOLIC BLOOD PRESSURE: 89 MMHG | HEART RATE: 70 BPM | SYSTOLIC BLOOD PRESSURE: 146 MMHG | TEMPERATURE: 98 F | RESPIRATION RATE: 19 BRPM

## 2025-01-14 LAB
ANION GAP SERPL CALC-SCNC: 5 MMOL/L — SIGNIFICANT CHANGE UP (ref 5–17)
BASOPHILS # BLD AUTO: 0.04 K/UL — SIGNIFICANT CHANGE UP (ref 0–0.2)
BASOPHILS NFR BLD AUTO: 0.9 % — SIGNIFICANT CHANGE UP (ref 0–2)
BUN SERPL-MCNC: 6 MG/DL — LOW (ref 7–23)
CALCIUM SERPL-MCNC: 8.8 MG/DL — SIGNIFICANT CHANGE UP (ref 8.5–10.1)
CHLORIDE SERPL-SCNC: 101 MMOL/L — SIGNIFICANT CHANGE UP (ref 96–108)
CO2 SERPL-SCNC: 25 MMOL/L — SIGNIFICANT CHANGE UP (ref 22–31)
CREAT SERPL-MCNC: 0.52 MG/DL — SIGNIFICANT CHANGE UP (ref 0.5–1.3)
CULTURE RESULTS: SIGNIFICANT CHANGE UP
EGFR: 100 ML/MIN/1.73M2 — SIGNIFICANT CHANGE UP
EOSINOPHIL # BLD AUTO: 0.17 K/UL — SIGNIFICANT CHANGE UP (ref 0–0.5)
EOSINOPHIL NFR BLD AUTO: 4 % — SIGNIFICANT CHANGE UP (ref 0–6)
GLUCOSE SERPL-MCNC: 89 MG/DL — SIGNIFICANT CHANGE UP (ref 70–99)
HCT VFR BLD CALC: 33.8 % — LOW (ref 34.5–45)
HGB BLD-MCNC: 11.5 G/DL — SIGNIFICANT CHANGE UP (ref 11.5–15.5)
IMM GRANULOCYTES NFR BLD AUTO: 0.2 % — SIGNIFICANT CHANGE UP (ref 0–0.9)
LYMPHOCYTES # BLD AUTO: 1.11 K/UL — SIGNIFICANT CHANGE UP (ref 1–3.3)
LYMPHOCYTES # BLD AUTO: 25.9 % — SIGNIFICANT CHANGE UP (ref 13–44)
MAGNESIUM SERPL-MCNC: 1.9 MG/DL — SIGNIFICANT CHANGE UP (ref 1.6–2.6)
MCHC RBC-ENTMCNC: 28.5 PG — SIGNIFICANT CHANGE UP (ref 27–34)
MCHC RBC-ENTMCNC: 34 G/DL — SIGNIFICANT CHANGE UP (ref 32–36)
MCV RBC AUTO: 83.9 FL — SIGNIFICANT CHANGE UP (ref 80–100)
MONOCYTES # BLD AUTO: 0.48 K/UL — SIGNIFICANT CHANGE UP (ref 0–0.9)
MONOCYTES NFR BLD AUTO: 11.2 % — SIGNIFICANT CHANGE UP (ref 2–14)
NEUTROPHILS # BLD AUTO: 2.48 K/UL — SIGNIFICANT CHANGE UP (ref 1.8–7.4)
NEUTROPHILS NFR BLD AUTO: 57.8 % — SIGNIFICANT CHANGE UP (ref 43–77)
PHOSPHATE SERPL-MCNC: 2 MG/DL — LOW (ref 2.5–4.5)
PLATELET # BLD AUTO: 286 K/UL — SIGNIFICANT CHANGE UP (ref 150–400)
POTASSIUM SERPL-MCNC: 3.2 MMOL/L — LOW (ref 3.5–5.3)
POTASSIUM SERPL-SCNC: 3.2 MMOL/L — LOW (ref 3.5–5.3)
RBC # BLD: 4.03 M/UL — SIGNIFICANT CHANGE UP (ref 3.8–5.2)
RBC # FLD: 13.2 % — SIGNIFICANT CHANGE UP (ref 10.3–14.5)
SODIUM SERPL-SCNC: 131 MMOL/L — LOW (ref 135–145)
SPECIMEN SOURCE: SIGNIFICANT CHANGE UP
WBC # BLD: 4.29 K/UL — SIGNIFICANT CHANGE UP (ref 3.8–10.5)
WBC # FLD AUTO: 4.29 K/UL — SIGNIFICANT CHANGE UP (ref 3.8–10.5)

## 2025-01-14 PROCEDURE — 99232 SBSQ HOSP IP/OBS MODERATE 35: CPT

## 2025-01-14 RX ORDER — SODIUM CHLORIDE 9 MG/ML
1 INJECTION, SOLUTION INTRAMUSCULAR; INTRAVENOUS; SUBCUTANEOUS ONCE
Refills: 0 | Status: COMPLETED | OUTPATIENT
Start: 2025-01-14 | End: 2025-01-14

## 2025-01-14 RX ORDER — SOD PHOS DI, MONO/K PHOS MONO 250 MG
1 TABLET ORAL
Qty: 5 | Refills: 0
Start: 2025-01-14 | End: 2025-01-18

## 2025-01-14 RX ORDER — POTASSIUM CHLORIDE 600 MG/1
1 TABLET, FILM COATED, EXTENDED RELEASE ORAL
Qty: 5 | Refills: 0
Start: 2025-01-14 | End: 2025-01-18

## 2025-01-14 RX ORDER — SOD PHOS DI, MONO/K PHOS MONO 250 MG
1 TABLET ORAL ONCE
Refills: 0 | Status: COMPLETED | OUTPATIENT
Start: 2025-01-14 | End: 2025-01-14

## 2025-01-14 RX ORDER — SODIUM CHLORIDE 9 MG/ML
1 INJECTION, SOLUTION INTRAMUSCULAR; INTRAVENOUS; SUBCUTANEOUS
Qty: 5 | Refills: 0
Start: 2025-01-14 | End: 2025-01-18

## 2025-01-14 RX ORDER — POTASSIUM CHLORIDE 600 MG/1
40 TABLET, FILM COATED, EXTENDED RELEASE ORAL ONCE
Refills: 0 | Status: COMPLETED | OUTPATIENT
Start: 2025-01-14 | End: 2025-01-14

## 2025-01-14 RX ADMIN — SODIUM CHLORIDE 1 GRAM(S): 9 INJECTION, SOLUTION INTRAMUSCULAR; INTRAVENOUS; SUBCUTANEOUS at 12:47

## 2025-01-14 RX ADMIN — LEVOTHYROXINE SODIUM 75 MICROGRAM(S): 175 TABLET ORAL at 05:04

## 2025-01-14 RX ADMIN — Medication 1 PACKET(S): at 12:46

## 2025-01-14 RX ADMIN — LOSARTAN POTASSIUM 100 MILLIGRAM(S): 100 TABLET, FILM COATED ORAL at 09:49

## 2025-01-14 RX ADMIN — POTASSIUM CHLORIDE 40 MILLIEQUIVALENT(S): 600 TABLET, FILM COATED, EXTENDED RELEASE ORAL at 11:43

## 2025-01-14 NOTE — DISCHARGE NOTE PROVIDER - CARE PROVIDER_API CALL
Roge Ashley  Gastroenterology  [address], [address]  [place] [address]  Phone: [phone]  Fax: [phone]  Established Patient  Follow Up Time:     Scot West  Pulmonary Disease  [address]  [place] [address]  Phone: [phone]  Fax: [phone]  Established Patient  Follow Up Time:

## 2025-01-14 NOTE — DISCHARGE NOTE PROVIDER - PROVIDER TOKENS
PROVIDER:[TOKEN:[[id number]],ESTABLISHEDPATIENT:[T]],PROVIDER:[TOKEN:[[id number]],ESTABLISHEDPATIENT:[T]]

## 2025-01-14 NOTE — DISCHARGE NOTE NURSING/CASE MANAGEMENT/SOCIAL WORK - PATIENT PORTAL LINK FT
You can access the FollowMyHealth Patient Portal offered by [facility] by registering at the following website: [email or web address]. By joining [facility] [facility]’s FollowMyHealth portal, you will also be able to view your health information using other applications (apps) compatible with our system.

## 2025-01-14 NOTE — DISCHARGE NOTE PROVIDER - HOSPITAL COURSE
68 yo F with hx of IBS, HTN, hypothyroidism, anxiety presents due to abdominal pain and cramping X 4 days, nausea and vomiting X 4 days, loose stools intermittently. Patient recently admitted from 1/1 - 1/3/25 for diarrhea, treated with cipro and flagyl at the time, Symptoms improved after hospitalization but did not resolve. Symptoms worsened again for the past several days, reports initially 3 watery BMs a day, yesterday she started taking imodium and the last BM was earlier yesterday. She also c/o progressive fatigue and chills. Denies bloody or black stools or vomit. Denies chest pain or SOB. In ED VSS, CBC noted for WBC 10.5, H/H12/36, Plt 368, CMP noted fir Na 122, K 2.7, Cl 86, Mag 1.4. UA sterile, flu/COVID negative. CT abdomen suspicious for colitis, see full study below. US dopplers neg for DVT.     Colitis.   -Patient recently treated for infectious colitis, improved with acute worsening.   -CT Abd Noted above, mild ductal dilation  -S/p Zosyn in ED.   -Will follow GI consult to MRCP as recommended by radiology on the CT to further eval CBD dilatation.   -C/w zosyn for now and ID consult placed, f/u GI PCR.  -C/w IVF  -ID and GI consult.    Hypokalemia.   -will Supplement  -F/u repeat K.    Hypophosphatemia.   -will Supplement  F/u repeat Mg.    HTN (hypertension).   -Hold HCTZ  -C/w losartan.    Hypothyroidism.   - C/w levothyroxine.     70 yo F with hx of IBS, HTN, hypothyroidism, anxiety presents due to abdominal pain and cramping X 4 days, nausea and vomiting X 4 days, loose stools intermittently. Patient recently admitted from 1/1 - 1/3/25 for diarrhea, treated with cipro and flagyl at the time, Symptoms improved after hospitalization but did not resolve. Symptoms worsened again for the past several days, reports initially 3 watery BMs a day, yesterday she started taking imodium and the last BM was earlier yesterday. She also c/o progressive fatigue and chills. Denies bloody or black stools or vomit. Denies chest pain or SOB. In ED VSS, CBC noted for WBC 10.5, H/H12/36, Plt 368, CMP noted fir Na 122, K 2.7, Cl 86, Mag 1.4. UA sterile, flu/COVID negative. CT abdomen suspicious for colitis, see full study below. US dopplers neg for DVT.     Recurrent gastroenteritis  -Patient recently treated for infectious colitis, improved   -CT Abd Noted above, mild ductal dilation  -seen by GI-no new intervention needed, follow up in clinic  -Seen by ID -no more antibiotics needed  -patient is also clinically better  -tolerating diet well  -will dc home today     Hypokalemia.   -replaced   -will send home with replacement     Hypophosphatemia.   -got replacement   -will send home with replacement     Hypothyroidism.   - C/w levothyroxine.    Patient is seen and examined. Will dc home today.  PHYSICAL EXAM:  GENERAL: NAD, lying in bed comfortably  HEAD:  Atraumatic, Normocephalic  EYES: conjunctiva and sclera clear  ENT: Moist mucous membranes  NECK: Supple, No JVD  CHEST/LUNG: Clear to auscultation bilaterally; No rales, rhonchi, wheezing. Unlabored respirations  HEART: Regular rate and rhythm; No murmurs  ABDOMEN: Bowel sounds present; Soft, Nontender, Nondistended.   EXTREMITIES:  2+ Peripheral Pulses, brisk capillary refill. No clubbing, cyanosis, or edema  NERVOUS SYSTEM:  Alert & Oriented X3, speech clear. No deficits   MSK: FROM all 4 extremities, full and equal strength  Vital Signs Last 24 Hrs  T(C): 36.7 (14 Jan 2025 07:54), Max: 37 (13 Jan 2025 23:06)  T(F): 98 (14 Jan 2025 07:54), Max: 98.6 (13 Jan 2025 23:06)  HR: 70 (14 Jan 2025 07:54) (66 - 90)  BP: 146/89 (14 Jan 2025 07:54) (140/91 - 147/-)  BP(mean): --  RR: 19 (14 Jan 2025 07:54) (16 - 19)  SpO2: 98% (14 Jan 2025 07:54) (98% - 100%)    Parameters below as of 14 Jan 2025 07:54  Patient On (Oxygen Delivery Method): room air

## 2025-01-14 NOTE — PROGRESS NOTE ADULT - SUBJECTIVE AND OBJECTIVE BOX
Patient is a 69y old  Female who presents with a chief complaint of diarrhea (14 Jan 2025 11:51)      Subective:  No BMs or complaints    PAST MEDICAL & SURGICAL HISTORY:  HTN (hypertension)      Hypothyroidism      Anxiety      IBS (irritable bowel syndrome)          MEDICATIONS  (STANDING):  gabapentin 300 milliGRAM(s) Oral every 8 hours  heparin   Injectable 5000 Unit(s) SubCutaneous every 12 hours  levothyroxine 75 MICROGram(s) Oral daily  losartan 100 milliGRAM(s) Oral daily  potassium phosphate / sodium phosphate Powder (PHOS-NaK) 1 Packet(s) Oral once  sodium chloride 1 Gram(s) Oral once    MEDICATIONS  (PRN):  acetaminophen     Tablet .. 650 milliGRAM(s) Oral every 6 hours PRN Mild Pain (1 - 3)  ALPRAZolam 0.5 milliGRAM(s) Oral daily PRN Anxiety  aluminum hydroxide/magnesium hydroxide/simethicone Suspension 30 milliLiter(s) Oral every 4 hours PRN Dyspepsia  artificial  tears Solution 1 Drop(s) Both EYES four times a day PRN Dry Eyes  melatonin 3 milliGRAM(s) Oral at bedtime PRN Insomnia  ondansetron Injectable 4 milliGRAM(s) IV Push every 6 hours PRN Nausea and/or Vomiting      REVIEW OF SYSTEMS:    RESPIRATORY: No shortness of breath  CARDIOVASCULAR: No chest pain  All other review of systems is negative unless indicated above.    Vital Signs Last 24 Hrs  T(C): 36.7 (14 Jan 2025 07:54), Max: 37 (13 Jan 2025 23:06)  T(F): 98 (14 Jan 2025 07:54), Max: 98.6 (13 Jan 2025 23:06)  HR: 70 (14 Jan 2025 07:54) (66 - 90)  BP: 146/89 (14 Jan 2025 07:54) (140/91 - 147/-)  BP(mean): --  RR: 19 (14 Jan 2025 07:54) (16 - 19)  SpO2: 98% (14 Jan 2025 07:54) (98% - 100%)    Parameters below as of 14 Jan 2025 07:54  Patient On (Oxygen Delivery Method): room air        PHYSICAL EXAM:    Constitutional: NAD, well-developed  Respiratory: CTAB  Cardiovascular: S1 and S2, RRR  Gastrointestinal: BS+, soft, NT/ND  Extremities: No peripheral edema  Psychiatric: Normal mood, normal affect    LABS:                        11.5   4.29  )-----------( 286      ( 14 Jan 2025 07:55 )             33.8     01-14    131[L]  |  101  |  6[L]  ----------------------------<  89  3.2[L]   |  25  |  0.52    Ca    8.8      14 Jan 2025 07:55  Phos  2.0     01-14  Mg     1.9     01-14    TPro  5.5[L]  /  Alb  3.0[L]  /  TBili  0.4  /  DBili  x   /  AST  11[L]  /  ALT  21  /  AlkPhos  33[L]  01-13      LIVER FUNCTIONS - ( 13 Jan 2025 07:27 )  Alb: 3.0 g/dL / Pro: 5.5 gm/dL / ALK PHOS: 33 U/L / ALT: 21 U/L / AST: 11 U/L / GGT: x             RADIOLOGY & ADDITIONAL STUDIES:
Patient is seen and examined. Chart is reviewed. Admitted for colitis. No nausea/vomiting or diarrhea this am.    Gen: No fever, chills, weakness  ENT: No visual changes or throat pain  Neck: No pain or stiffness  Respiratory: No cough or wheezing  Cardiovascular: No chest pain or palpitations  Gastrointestinal: No abdominal pain, nausea, vomiting, constipation, or diarrhea  Hematologic: No easy bleeding or bruising  Neurologic: No numbness or focal weakness  Psych: No depression or insomnia  Skin: No rash or itching      MEDICATIONS  (STANDING):  gabapentin 300 milliGRAM(s) Oral every 8 hours  heparin   Injectable 5000 Unit(s) SubCutaneous every 12 hours  lactated ringers. 1000 milliLiter(s) (75 mL/Hr) IV Continuous <Continuous>  levothyroxine 75 MICROGram(s) Oral daily  losartan 100 milliGRAM(s) Oral daily  piperacillin/tazobactam IVPB.. 3.375 Gram(s) IV Intermittent every 8 hours  potassium phosphate / sodium phosphate Powder (PHOS-NaK) 1 Packet(s) Oral once    MEDICATIONS  (PRN):  acetaminophen     Tablet .. 650 milliGRAM(s) Oral every 6 hours PRN Mild Pain (1 - 3)  ALPRAZolam 0.5 milliGRAM(s) Oral daily PRN Anxiety  aluminum hydroxide/magnesium hydroxide/simethicone Suspension 30 milliLiter(s) Oral every 4 hours PRN Dyspepsia  melatonin 3 milliGRAM(s) Oral at bedtime PRN Insomnia  ondansetron Injectable 4 milliGRAM(s) IV Push every 6 hours PRN Nausea and/or Vomiting      Vital Signs Last 24 Hrs  T(C): 36.2 (13 Jan 2025 08:27), Max: 37.1 (12 Jan 2025 22:03)  T(F): 97.2 (13 Jan 2025 08:27), Max: 98.8 (12 Jan 2025 22:03)  HR: 92 (13 Jan 2025 08:27) (74 - 92)  BP: 139/70 (13 Jan 2025 08:27) (139/70 - 146/92)  BP(mean): 86 (13 Jan 2025 08:27) (86 - 94)  RR: 18 (13 Jan 2025 08:27) (18 - 19)  SpO2: 99% (13 Jan 2025 08:27) (97% - 100%)    Parameters below as of 13 Jan 2025 08:27  Patient On (Oxygen Delivery Method): room air        PHYSICAL EXAM:  GENERAL: NAD, lying in bed comfortably  HEAD:  Atraumatic, Normocephalic  EYES: conjunctiva and sclera clear  ENT: Moist mucous membranes  NECK: Supple, No JVD  CHEST/LUNG: Clear to auscultation bilaterally; No rales, rhonchi, wheezing. Unlabored respirations  HEART: Regular rate and rhythm; No murmurs  ABDOMEN: Bowel sounds present; Soft, Nontender, Nondistended.   EXTREMITIES:  2+ Peripheral Pulses, brisk capillary refill. No clubbing, cyanosis, or edema  NERVOUS SYSTEM:  Alert & Oriented X3, speech clear. No deficits   MSK: FROM all 4 extremities, full and equal strength          LABS:                          11.2   6.11  )-----------( 341      ( 13 Jan 2025 07:27 )             33.1     13 Jan 2025 07:27    132    |  101    |  4      ----------------------------<  89     3.3     |  25     |  0.52     Ca    7.7        13 Jan 2025 07:27  Phos  1.5       13 Jan 2025 07:27  Mg     1.7       13 Jan 2025 07:27    TPro  5.5    /  Alb  3.0    /  TBili  0.4    /  DBili  x      /  AST  11     /  ALT  21     /  AlkPhos  33     13 Jan 2025 07:27    LIVER FUNCTIONS - ( 13 Jan 2025 07:27 )  Alb: 3.0 g/dL / Pro: 5.5 gm/dL / ALK PHOS: 33 U/L / ALT: 21 U/L / AST: 11 U/L / GGT: x             CAPILLARY BLOOD GLUCOSE            Urinalysis Basic - ( 13 Jan 2025 07:27 )    Color: x / Appearance: x / SG: x / pH: x  Gluc: 89 mg/dL / Ketone: x  / Bili: x / Urobili: x   Blood: x / Protein: x / Nitrite: x   Leuk Esterase: x / RBC: x / WBC x   Sq Epi: x / Non Sq Epi: x / Bacteria: x        RADIOLOGY:        
Date of service: 01-14-25 @ 11:52    Sitting in bed in NAD  Loose stools have improved  No abdominal pain    ROS: no fever or chills; denies dizziness, no HA, no SOB or cough, no dysuria, no legs pain, no rashes    MEDICATIONS  (STANDING):  gabapentin 300 milliGRAM(s) Oral every 8 hours  heparin   Injectable 5000 Unit(s) SubCutaneous every 12 hours  levothyroxine 75 MICROGram(s) Oral daily  losartan 100 milliGRAM(s) Oral daily  potassium phosphate / sodium phosphate Powder (PHOS-NaK) 1 Packet(s) Oral once  sodium chloride 1 Gram(s) Oral once    Vital Signs Last 24 Hrs  T(C): 36.7 (14 Jan 2025 07:54), Max: 37 (13 Jan 2025 23:06)  T(F): 98 (14 Jan 2025 07:54), Max: 98.6 (13 Jan 2025 23:06)  HR: 70 (14 Jan 2025 07:54) (66 - 90)  BP: 146/89 (14 Jan 2025 07:54) (140/91 - 147/-)  BP(mean): --  RR: 19 (14 Jan 2025 07:54) (16 - 19)  SpO2: 98% (14 Jan 2025 07:54) (98% - 100%)    Parameters below as of 14 Jan 2025 07:54  Patient On (Oxygen Delivery Method): room air     Physical exam:    Constitutional:  No acute distress  HEENT: NC/AT, EOMI, PERRLA, conjunctivae clear; ears and nose atraumatic; pharynx benign  Neck: supple; thyroid not palpable  Back: no tenderness  Respiratory: respiratory effort normal; clear to auscultation  Cardiovascular: S1S2 regular, no murmurs  Abdomen: soft, not tender, not distended, positive BS; no liver or spleen organomegaly  Genitourinary: no suprapubic tenderness  Lymphatic: no LN palpable  Musculoskeletal: no muscle tenderness, no joint swelling or tenderness  Extremities: no pedal edema  Neurological/ Psychiatric: AxOx3, judgement and insight normal; moving all extremities  Skin: no rashes; no palpable lesions    Labs: all available labs reviewed                        11.2   6.11  )-----------( 341      ( 13 Jan 2025 07:27 )             33.1     01-13    132[L]  |  101  |  4[L]  ----------------------------<  89  3.3[L]   |  25  |  0.52    Ca    7.7[L]      13 Jan 2025 07:27  Phos  1.5     01-13  Mg     1.7     01-13    TPro  5.5[L]  /  Alb  3.0[L]  /  TBili  0.4  /  DBili  x   /  AST  11[L]  /  ALT  21  /  AlkPhos  33[L]  01-13     LIVER FUNCTIONS - ( 13 Jan 2025 07:27 )  Alb: 3.0 g/dL / Pro: 5.5 gm/dL / ALK PHOS: 33 U/L / ALT: 21 U/L / AST: 11 U/L / GGT: x           Urinalysis (01-12 @ 23:34)  Urine Appearance: Clear  Protein, Urine: Trace mg/dL  Urine Microscopic-Add On (NC) (01-12 @ 23:34)  White Blood Cell - Urine: 1 /HPF  Red Blood Cell - Urine: 36 /HPF    Culture - Urine (collected 12 Jan 2025 23:34)  Source: Clean Catch Clean Catch (Midstream)  Final Report (14 Jan 2025 07:37):    <10,000 CFU/mL Normal Urogenital Karime    Culture - Blood (collected 12 Jan 2025 22:40)  Source: .Blood BLOOD  Preliminary Report (14 Jan 2025 10:01):    No growth at 24 hours    Culture - Blood (collected 12 Jan 2025 22:40)  Source: .Blood BLOOD  Preliminary Report (14 Jan 2025 10:01):    No growth at 24 hours    Radiology: all available radiological tests reviewed    < from: CT Abdomen and Pelvis w/ IV Cont (01.13.25 @ 00:09) >  1. Mild diffuse colonic wall thickening suspicious for colitis.  2. 5 mm polypoid soft tissue nodular density in the proximal stomach.  3. Mixed attenuation in the right femoral vein could reflect mixing artifact but is indeterminate onthis exam. Recommend correlation with right lower extremity DVT ultrasound.  4. Mild intrahepatic ductal dilatation and slight prominence of the CBD appears stable since 2019.   < end of copied text >    < from: US Duplex Venous Lower Ext Complete, Bilateral (01.13.25 @ 02:48) >  No evidence of deep venous thrombosis in either lower extremity.  < end of copied text >    < from: Xray Chest 1 View- PORTABLE-Urgent (01.12.25 @ 22:47) >  No focal airspace opacities.  < end of copied text >    Advanced directives addressed: full resuscitation

## 2025-01-14 NOTE — PROGRESS NOTE ADULT - ASSESSMENT
Imp:  Recurrent gastroenteritis symptoms but OK now    Rec:  F/U with me outpatient
68 y/o Female with h/o IBS, HTN, hypothyroidism, anxiety was admitted on 1/12 for abdominal pain and cramping X 4 days, nausea and vomiting X 4 days, loose stools intermittently. Patient recently admitted from 1/1 - 1/3/25 for diarrhea, treated with cipro and flagyl at the time. Her symptoms improved after hospitalization but did not resolve. Symptoms worsened again for the past several days, reports initially 3 watery BMs a day, yesterday she started taking imodium and the last BM was earlier yesterday. She also had progressive fatigue and chills. Denies bloody or black stools or vomit. In ED VSS, CBC noted for WBC 10.5. UA sterile, flu/COVID negative. CT abdomen suspicious for colitis.    #Diarrheal syndrome resolved.   #Mild diffuse colonic wall thickening suspicious for colitis.  #IBS  -likely noninfectious etiology  -symptoms did note resolved with recent abx therapy with cipro and metronidazole  -stool studies were not collected since the patient's diarrheal syndrome is resolved  -continue to observe off abx therapy  -f/u with GI as outpatient  -monitor abdomen  -monitor temps  -f/u CBC  -supportive care  2. Other issues:   -care per medicine    
A/P:    Colitis.   -Patient recently treated for infectious colitis, improved with acute worsening.   -CT Abd Noted above, mild ductal dilation  -S/p Zosyn in ED.   -Will follow GI consult to MRCP as recommended by radiology on the CT to further eval CBD dilatation.   -C/w zosyn for now and ID consult placed, f/u GI PCR.  -C/w IVF  -ID and GI consult.    Hypokalemia.   -will Supplement  -F/u repeat K.    Hypophosphatemia.   -will Supplement  F/u repeat Mg.    HTN (hypertension).   -Hold HCTZ  -C/w losartan.    Hypothyroidism.   - C/w levothyroxine.    DVT ppx- hepsubq  fall precautions  Full code

## 2025-01-14 NOTE — DISCHARGE NOTE PROVIDER - NSDCCPCAREPLAN_GEN_ALL_CORE_FT
PRINCIPAL DISCHARGE DIAGNOSIS  Diagnosis: Gastroenteritis  Assessment and Plan of Treatment: Resolved.      SECONDARY DISCHARGE DIAGNOSES  Diagnosis: Hyponatremia  Assessment and Plan of Treatment: Take regular salt diet. Take 5 days of salt tabs and follow up with your PCP to check Sodium level. May need more work up as outpatient if Sodium level stays low persistenrtly.    Diagnosis: Intractable nausea and vomiting  Assessment and Plan of Treatment: Resolved,    Diagnosis: Hypokalemia  Assessment and Plan of Treatment: Take 5 more days of oral supplement for potassium and follow up with primary care provider to check potassium level.    Diagnosis: Hypophosphatemia  Assessment and Plan of Treatment: Take 5 more days of oral supplement for phosphorus and follow up with primary care provider to check phosphorus level.

## 2025-01-14 NOTE — DISCHARGE NOTE PROVIDER - NSDCMRMEDTOKEN_GEN_ALL_CORE_FT
gabapentin 300 mg oral capsule: 1 cap(s) orally every 8 hours  losartan-hydroCHLOROthiazide 100 mg-25 mg oral tablet: 1 tab(s) orally once a day  Matzim  mg/24 hours oral tablet, extended release: 1 tab(s) orally once a day  NexIUM 40 mg oral delayed release capsule: 1 cap(s) orally 2 times a day  Restasis 0.05% ophthalmic emulsion: 1 drop(s) in each affected eye every 12 hours  Synthroid 75 mcg (0.075 mg) oral tablet: 1 tab(s) orally once a day  Xanax 0.5 mg oral tablet: 1 tab(s) orally once a day as needed for   gabapentin 300 mg oral capsule: 1 cap(s) orally every 8 hours  losartan-hydroCHLOROthiazide 100 mg-25 mg oral tablet: 1 tab(s) orally once a day  Matzim  mg/24 hours oral tablet, extended release: 1 tab(s) orally once a day  NexIUM 40 mg oral delayed release capsule: 1 cap(s) orally 2 times a day  potassium chloride 20 mEq oral tablet, extended release: 1 tab(s) orally once a day  Restasis 0.05% ophthalmic emulsion: 1 drop(s) in each affected eye every 12 hours  Sodium Chloride 1 g oral tablet: 1 tab(s) orally once a day  sodium/potassium/phosphorus 160 mg-280 mg-250 mg oral powder for reconstitution: 1 packet(s) orally once a day  Synthroid 75 mcg (0.075 mg) oral tablet: 1 tab(s) orally once a day  Xanax 0.5 mg oral tablet: 1 tab(s) orally once a day as needed for

## 2025-01-14 NOTE — DISCHARGE NOTE NURSING/CASE MANAGEMENT/SOCIAL WORK - FINANCIAL ASSISTANCE
[facility] provides services at a reduced cost to those who are determined to be eligible through [facility]’s financial assistance program. Information regarding [facility]’s financial assistance program can be found by going to [email or web address] or by calling [phone].

## 2025-01-14 NOTE — DISCHARGE NOTE PROVIDER - CARE PROVIDERS DIRECT ADDRESSES
,DirectAddress_Unknown,[email or web address]

## 2025-01-24 ENCOUNTER — APPOINTMENT (OUTPATIENT)
Dept: INTERNAL MEDICINE | Facility: CLINIC | Age: 70
End: 2025-01-24
Payer: MEDICARE

## 2025-01-24 VITALS
SYSTOLIC BLOOD PRESSURE: 130 MMHG | OXYGEN SATURATION: 98 % | TEMPERATURE: 98 F | HEART RATE: 73 BPM | RESPIRATION RATE: 15 BRPM | DIASTOLIC BLOOD PRESSURE: 68 MMHG | BODY MASS INDEX: 20.2 KG/M2 | WEIGHT: 107 LBS | HEIGHT: 61 IN

## 2025-01-24 DIAGNOSIS — K52.9 NONINFECTIVE GASTROENTERITIS AND COLITIS, UNSPECIFIED: ICD-10-CM

## 2025-01-24 PROCEDURE — 99495 TRANSJ CARE MGMT MOD F2F 14D: CPT

## 2025-01-27 LAB
ALBUMIN SERPL ELPH-MCNC: 4.2 G/DL
ALP BLD-CCNC: 57 U/L
ALT SERPL-CCNC: 10 U/L
ANION GAP SERPL CALC-SCNC: 11 MMOL/L
AST SERPL-CCNC: 14 U/L
BASOPHILS # BLD AUTO: 0.05 K/UL
BASOPHILS NFR BLD AUTO: 0.9 %
BILIRUB SERPL-MCNC: 0.2 MG/DL
BUN SERPL-MCNC: 13 MG/DL
CALCIUM SERPL-MCNC: 9.5 MG/DL
CHLORIDE SERPL-SCNC: 96 MMOL/L
CO2 SERPL-SCNC: 28 MMOL/L
CREAT SERPL-MCNC: 0.78 MG/DL
EGFR: 82 ML/MIN/1.73M2
EOSINOPHIL # BLD AUTO: 0.12 K/UL
EOSINOPHIL NFR BLD AUTO: 2.2 %
GLUCOSE SERPL-MCNC: 80 MG/DL
HCT VFR BLD CALC: 34.8 %
HGB BLD-MCNC: 11.6 G/DL
IMM GRANULOCYTES NFR BLD AUTO: 0.4 %
LYMPHOCYTES # BLD AUTO: 1.14 K/UL
LYMPHOCYTES NFR BLD AUTO: 20.8 %
MAN DIFF?: NORMAL
MCHC RBC-ENTMCNC: 28.6 PG
MCHC RBC-ENTMCNC: 33.3 G/DL
MCV RBC AUTO: 85.9 FL
MONOCYTES # BLD AUTO: 0.43 K/UL
MONOCYTES NFR BLD AUTO: 7.8 %
NEUTROPHILS # BLD AUTO: 3.73 K/UL
NEUTROPHILS NFR BLD AUTO: 67.9 %
PLATELET # BLD AUTO: 316 K/UL
POTASSIUM SERPL-SCNC: 3.7 MMOL/L
PROT SERPL-MCNC: 6.1 G/DL
RBC # BLD: 4.05 M/UL
RBC # FLD: 14.2 %
SODIUM SERPL-SCNC: 135 MMOL/L
WBC # FLD AUTO: 5.49 K/UL

## 2025-05-27 ENCOUNTER — APPOINTMENT (OUTPATIENT)
Dept: INTERNAL MEDICINE | Facility: CLINIC | Age: 70
End: 2025-05-27
Payer: MEDICARE

## 2025-05-27 VITALS
BODY MASS INDEX: 21.79 KG/M2 | HEART RATE: 78 BPM | DIASTOLIC BLOOD PRESSURE: 74 MMHG | WEIGHT: 111 LBS | TEMPERATURE: 98.6 F | HEIGHT: 60 IN | SYSTOLIC BLOOD PRESSURE: 134 MMHG | OXYGEN SATURATION: 97 %

## 2025-05-27 DIAGNOSIS — I49.3 VENTRICULAR PREMATURE DEPOLARIZATION: ICD-10-CM

## 2025-05-27 DIAGNOSIS — E78.5 HYPERLIPIDEMIA, UNSPECIFIED: ICD-10-CM

## 2025-05-27 DIAGNOSIS — K21.9 GASTRO-ESOPHAGEAL REFLUX DISEASE W/OUT ESOPHAGITIS: ICD-10-CM

## 2025-05-27 DIAGNOSIS — E03.9 HYPOTHYROIDISM, UNSPECIFIED: ICD-10-CM

## 2025-05-27 DIAGNOSIS — Z00.00 ENCOUNTER FOR GENERAL ADULT MEDICAL EXAMINATION W/OUT ABNORMAL FINDINGS: ICD-10-CM

## 2025-05-27 DIAGNOSIS — I10 ESSENTIAL (PRIMARY) HYPERTENSION: ICD-10-CM

## 2025-05-27 PROCEDURE — 99214 OFFICE O/P EST MOD 30 MIN: CPT

## 2025-06-26 ENCOUNTER — NON-APPOINTMENT (OUTPATIENT)
Age: 70
End: 2025-06-26

## 2025-06-26 ENCOUNTER — APPOINTMENT (OUTPATIENT)
Dept: OBGYN | Facility: CLINIC | Age: 70
End: 2025-06-26
Payer: MEDICARE

## 2025-06-26 VITALS
BODY MASS INDEX: 21.79 KG/M2 | SYSTOLIC BLOOD PRESSURE: 120 MMHG | HEIGHT: 60 IN | DIASTOLIC BLOOD PRESSURE: 80 MMHG | WEIGHT: 111 LBS

## 2025-06-26 LAB
CARD LOT #: NORMAL
CARD LOT EXP DATE: NORMAL
DATE COLLECTED: NORMAL
DEVELOPER LOT #: NORMAL
DEVELOPER LOT EXP DATE: NORMAL
HEMOCCULT 2: NEGATIVE
HEMOCCULT 3: NEGATIVE
HEMOCCULT SP1 STL QL: NEGATIVE
QUALITY CONTROL: YES

## 2025-06-26 PROCEDURE — 82270 OCCULT BLOOD FECES: CPT

## 2025-06-26 PROCEDURE — 99397 PER PM REEVAL EST PAT 65+ YR: CPT | Mod: GY

## 2025-06-26 PROCEDURE — G0101: CPT | Mod: GA

## 2025-07-02 LAB — CYTOLOGY CVX/VAG DOC THIN PREP: NORMAL

## 2025-07-07 ENCOUNTER — EMERGENCY (EMERGENCY)
Facility: HOSPITAL | Age: 70
LOS: 0 days | Discharge: HOME | End: 2025-07-07
Attending: EMERGENCY MEDICINE
Payer: MEDICARE

## 2025-07-07 VITALS
RESPIRATION RATE: 17 BRPM | TEMPERATURE: 99 F | OXYGEN SATURATION: 99 % | SYSTOLIC BLOOD PRESSURE: 122 MMHG | HEART RATE: 80 BPM | DIASTOLIC BLOOD PRESSURE: 80 MMHG

## 2025-07-07 VITALS — TEMPERATURE: 99 F

## 2025-07-07 DIAGNOSIS — B97.89 OTHER VIRAL AGENTS AS THE CAUSE OF DISEASES CLASSIFIED ELSEWHERE: ICD-10-CM

## 2025-07-07 DIAGNOSIS — J06.9 ACUTE UPPER RESPIRATORY INFECTION, UNSPECIFIED: ICD-10-CM

## 2025-07-07 DIAGNOSIS — R05.1 ACUTE COUGH: ICD-10-CM

## 2025-07-07 DIAGNOSIS — E87.1 HYPO-OSMOLALITY AND HYPONATREMIA: ICD-10-CM

## 2025-07-07 DIAGNOSIS — Z88.8 ALLERGY STATUS TO OTHER DRUGS, MEDICAMENTS AND BIOLOGICAL SUBSTANCES: ICD-10-CM

## 2025-07-07 DIAGNOSIS — I10 ESSENTIAL (PRIMARY) HYPERTENSION: ICD-10-CM

## 2025-07-07 DIAGNOSIS — Z88.5 ALLERGY STATUS TO NARCOTIC AGENT: ICD-10-CM

## 2025-07-07 LAB
ALBUMIN SERPL ELPH-MCNC: 4 G/DL — SIGNIFICANT CHANGE UP (ref 3.3–5)
ALP SERPL-CCNC: 51 U/L — SIGNIFICANT CHANGE UP (ref 40–120)
ALT FLD-CCNC: 24 U/L — SIGNIFICANT CHANGE UP (ref 12–78)
ANION GAP SERPL CALC-SCNC: 9 MMOL/L — SIGNIFICANT CHANGE UP (ref 5–17)
AST SERPL-CCNC: 14 U/L — LOW (ref 15–37)
BASOPHILS # BLD AUTO: 0.05 K/UL — SIGNIFICANT CHANGE UP (ref 0–0.2)
BASOPHILS NFR BLD AUTO: 0.5 % — SIGNIFICANT CHANGE UP (ref 0–2)
BILIRUB SERPL-MCNC: 0.4 MG/DL — SIGNIFICANT CHANGE UP (ref 0.2–1.2)
BUN SERPL-MCNC: 11 MG/DL — SIGNIFICANT CHANGE UP (ref 7–23)
CALCIUM SERPL-MCNC: 9.5 MG/DL — SIGNIFICANT CHANGE UP (ref 8.5–10.1)
CHLORIDE SERPL-SCNC: 89 MMOL/L — LOW (ref 96–108)
CO2 SERPL-SCNC: 28 MMOL/L — SIGNIFICANT CHANGE UP (ref 22–31)
CREAT SERPL-MCNC: 0.77 MG/DL — SIGNIFICANT CHANGE UP (ref 0.5–1.3)
EGFR: 82 ML/MIN/1.73M2 — SIGNIFICANT CHANGE UP
EGFR: 82 ML/MIN/1.73M2 — SIGNIFICANT CHANGE UP
EOSINOPHIL # BLD AUTO: 0.01 K/UL — SIGNIFICANT CHANGE UP (ref 0–0.5)
EOSINOPHIL NFR BLD AUTO: 0.1 % — SIGNIFICANT CHANGE UP (ref 0–6)
FLUAV AG NPH QL: SIGNIFICANT CHANGE UP
FLUBV AG NPH QL: SIGNIFICANT CHANGE UP
GLUCOSE SERPL-MCNC: 115 MG/DL — HIGH (ref 70–99)
HCT VFR BLD CALC: 38.6 % — SIGNIFICANT CHANGE UP (ref 34.5–45)
HGB BLD-MCNC: 12.9 G/DL — SIGNIFICANT CHANGE UP (ref 11.5–15.5)
IMM GRANULOCYTES # BLD AUTO: 0.02 K/UL — SIGNIFICANT CHANGE UP (ref 0–0.07)
IMM GRANULOCYTES NFR BLD AUTO: 0.2 % — SIGNIFICANT CHANGE UP (ref 0–0.9)
LACTATE SERPL-SCNC: 1.3 MMOL/L — SIGNIFICANT CHANGE UP (ref 0.7–2)
LYMPHOCYTES # BLD AUTO: 0.51 K/UL — LOW (ref 1–3.3)
LYMPHOCYTES NFR BLD AUTO: 5.4 % — LOW (ref 13–44)
MCHC RBC-ENTMCNC: 26.5 PG — LOW (ref 27–34)
MCHC RBC-ENTMCNC: 33.4 G/DL — SIGNIFICANT CHANGE UP (ref 32–36)
MCV RBC AUTO: 79.4 FL — LOW (ref 80–100)
MONOCYTES # BLD AUTO: 0.7 K/UL — SIGNIFICANT CHANGE UP (ref 0–0.9)
MONOCYTES NFR BLD AUTO: 7.4 % — SIGNIFICANT CHANGE UP (ref 2–14)
NEUTROPHILS # BLD AUTO: 8.22 K/UL — HIGH (ref 1.8–7.4)
NEUTROPHILS NFR BLD AUTO: 86.4 % — HIGH (ref 43–77)
NRBC # BLD AUTO: 0 K/UL — SIGNIFICANT CHANGE UP (ref 0–0)
NRBC # FLD: 0 K/UL — SIGNIFICANT CHANGE UP (ref 0–0)
NRBC BLD AUTO-RTO: 0 /100 WBCS — SIGNIFICANT CHANGE UP (ref 0–0)
PLATELET # BLD AUTO: 355 K/UL — SIGNIFICANT CHANGE UP (ref 150–400)
PMV BLD: 10 FL — SIGNIFICANT CHANGE UP (ref 7–13)
POTASSIUM SERPL-MCNC: 3.1 MMOL/L — LOW (ref 3.5–5.3)
POTASSIUM SERPL-SCNC: 3.1 MMOL/L — LOW (ref 3.5–5.3)
PROT SERPL-MCNC: 7.4 GM/DL — SIGNIFICANT CHANGE UP (ref 6–8.3)
RBC # BLD: 4.86 M/UL — SIGNIFICANT CHANGE UP (ref 3.8–5.2)
RBC # FLD: 14.7 % — HIGH (ref 10.3–14.5)
RSV RNA NPH QL NAA+NON-PROBE: SIGNIFICANT CHANGE UP
SARS-COV-2 RNA SPEC QL NAA+PROBE: SIGNIFICANT CHANGE UP
SODIUM SERPL-SCNC: 126 MMOL/L — LOW (ref 135–145)
SOURCE RESPIRATORY: SIGNIFICANT CHANGE UP
WBC # BLD: 9.51 K/UL — SIGNIFICANT CHANGE UP (ref 3.8–10.5)
WBC # FLD AUTO: 9.51 K/UL — SIGNIFICANT CHANGE UP (ref 3.8–10.5)

## 2025-07-07 PROCEDURE — 87637 SARSCOV2&INF A&B&RSV AMP PRB: CPT

## 2025-07-07 PROCEDURE — 83605 ASSAY OF LACTIC ACID: CPT

## 2025-07-07 PROCEDURE — 71046 X-RAY EXAM CHEST 2 VIEWS: CPT | Mod: 26

## 2025-07-07 PROCEDURE — 99285 EMERGENCY DEPT VISIT HI MDM: CPT | Mod: 25

## 2025-07-07 PROCEDURE — 99284 EMERGENCY DEPT VISIT MOD MDM: CPT | Mod: FS

## 2025-07-07 PROCEDURE — 93010 ELECTROCARDIOGRAM REPORT: CPT

## 2025-07-07 PROCEDURE — 71046 X-RAY EXAM CHEST 2 VIEWS: CPT

## 2025-07-07 PROCEDURE — 36415 COLL VENOUS BLD VENIPUNCTURE: CPT

## 2025-07-07 PROCEDURE — 93005 ELECTROCARDIOGRAM TRACING: CPT

## 2025-07-07 PROCEDURE — 87040 BLOOD CULTURE FOR BACTERIA: CPT

## 2025-07-07 PROCEDURE — 80053 COMPREHEN METABOLIC PANEL: CPT

## 2025-07-07 PROCEDURE — 85025 COMPLETE CBC W/AUTO DIFF WBC: CPT

## 2025-07-07 RX ADMIN — Medication 40 MILLIEQUIVALENT(S): at 15:30

## 2025-07-07 RX ADMIN — Medication 2000 MILLILITER(S): at 14:14

## 2025-07-07 NOTE — ED STATDOCS - PROGRESS NOTE DETAILS
PA: Patient is a 69 y/o female with PMHx of chronic hyponatremia, HTN, who presents to [facility] c/o productive cough for 4 days with yellow mucus. ~Zbigniew Dalal PA-C PA note: All labwork/radiology results discussed in detail with patient. Hyponatremia is chronic for patient who states today's level of 126 is her baseline. DENIES any dizziness, n/v. Patient re-examined and re-evaluated. Patient feels much better at this time. ED evaluation, Diagnosis and management discussed with the patient in detail. Workup results discussed with ED attending, OK to dc home. Close PMD follow up encouraged, aftercare to assist with scheduling appointment ASAP. Strict ED return instructions discussed in detail and patient given the opportunity to ask any questions about their discharge diagnosis and instructions. Patient verbalized understanding. ~ Zbigniew Dalal PA-C

## 2025-07-07 NOTE — ED STATDOCS - CLINICAL SUMMARY MEDICAL DECISION MAKING FREE TEXT BOX
69 y/o female with HX of HTN presents to ED with productive cough for 4 days with yellow mucus. Will check labs, Chest-X-Ray.

## 2025-07-07 NOTE — ED STATDOCS - PHYSICAL EXAMINATION
GEN: Patient awake alert NAD.   HEENT: normocephalic, atraumatic, EOMI, no scleral icterus, moist MM  CARDIAC: RRR, S1, S2, no murmur.   PULM: CTA B/L no wheeze, rhonchi, rales, mild tachycardia  ABD: soft NT, ND, no rebound no guarding, no CVA tenderness.   MSK: Moving all extremities, no edema. 5/5 strength and full ROM in all extremities.     NEURO: A&Ox3, gait normal, no focal neurological deficits, CN 2-12 grossly intact  SKIN: warm, dry, no rash. GEN: Patient awake alert NAD.   HEENT: normocephalic, atraumatic, EOMI, no scleral icterus, moist MM  CARDIAC: RRR, S1, S2, no murmur.   PULM: CTA B/L no wheeze, rhonchi, rales, mild tachycardia  ABD: soft NT, ND, no rebound no guarding, no CVA tenderness.   MSK: Moving all extremities, no edema. 5/5 strength and full ROM in all extremities.     NEURO: A&Ox3, gait normal, no focal neurological deficits, CN 2-12 grossly intact  SKIN: warm, dry, no rash.    PA NOTE: GEN: AOX3, NAD. HEENT: Throat clear. Airway is patent. EYES: PERRLA. EOMI. Head: NC/AT. NECK: Supple, No JVD. FROM. C-spine non-tender. CV:S1S2, RRR, LUNGS: Non-labored breathing, no tachypnea. O2sat 100% RA. CTA b/l. No w/r/r. CHEST: Equal chest expansion and rise. No deformity. ABD: Soft, NT/ND, no rebound, no guarding. No CVAT. EXT: No e/c/c. 2+ distal pulses. SKIN: No rashes. NEURO: No focal deficits. CN II-XII intact. FROM. 5/5 motor and sensory. ~Zbigniew Dalal PA-C

## 2025-07-07 NOTE — ED STATDOCS - OBJECTIVE STATEMENT
69 y/o female with HX of HTN presents to ED with productive cough for 4 days with yellow mucus. Symptoms have persisted and not improved since. Denies difficulty ambulating, endorses general weakness.

## 2025-07-07 NOTE — ED STATDOCS - NSICDXPASTMEDICALHX_GEN_ALL_CORE_FT
PAST MEDICAL HISTORY:  Anxiety     HTN (hypertension)     Hypothyroidism     IBS (irritable bowel syndrome)     
-regular diet

## 2025-07-07 NOTE — ED STATDOCS - PATIENT PORTAL LINK FT
You can access the FollowMyHealth Patient Portal offered by [facility] by registering at the following website: [email or web address]. By joining [employer]’s FollowMyHealth portal, you will also be able to view your health information using other applications (apps) compatible with our system.

## 2025-07-07 NOTE — ED ADULT NURSE NOTE - OBJECTIVE STATEMENT
presenting to the ed C/O cough x 5 days with +sick contacts. Endorsing intermittent chills, nausea but denies vomiting, diarrhea., denies medications pta

## 2025-07-07 NOTE — ED ADULT TRIAGE NOTE - CHIEF COMPLAINT QUOTE
PT presents to er with complaints of a productive  cough with yellow sputum since last Thursday, denies fevers, states she feels chills and feels dehydrated.

## 2025-07-07 NOTE — ED ADULT NURSE NOTE - NSFALLUNIVINTERV_ED_ALL_ED
Bed/Stretcher in lowest position, wheels locked, appropriate side rails in place/Call bell, personal items and telephone in reach/Instruct patient to call for assistance before getting out of bed/chair/stretcher/Non-slip footwear applied when patient is off stretcher/[place] to call system/Physically safe environment - no spills, clutter or unnecessary equipment/Purposeful proactive rounding/Room/bathroom lighting operational, light cord in reach

## 2025-07-08 ENCOUNTER — NON-APPOINTMENT (OUTPATIENT)
Age: 70
End: 2025-07-08

## 2025-07-10 ENCOUNTER — APPOINTMENT (OUTPATIENT)
Dept: INTERNAL MEDICINE | Facility: CLINIC | Age: 70
End: 2025-07-10

## 2025-07-10 RX ORDER — PROMETHAZINE HYDROCHLORIDE AND DEXTROMETHORPHAN HYDROBROMIDE ORAL SOLUTION 15; 6.25 MG/5ML; MG/5ML
6.25-15 SOLUTION ORAL
Qty: 300 | Refills: 0 | Status: ACTIVE | COMMUNITY
Start: 2025-07-10 | End: 1900-01-01

## 2025-07-11 ENCOUNTER — APPOINTMENT (OUTPATIENT)
Dept: INTERNAL MEDICINE | Facility: CLINIC | Age: 70
End: 2025-07-11
Payer: MEDICARE

## 2025-07-11 VITALS
SYSTOLIC BLOOD PRESSURE: 120 MMHG | BODY MASS INDEX: 20.9 KG/M2 | OXYGEN SATURATION: 98 % | WEIGHT: 107 LBS | DIASTOLIC BLOOD PRESSURE: 82 MMHG | HEART RATE: 85 BPM | RESPIRATION RATE: 14 BRPM | TEMPERATURE: 98.2 F

## 2025-07-11 VITALS
SYSTOLIC BLOOD PRESSURE: 120 MMHG | TEMPERATURE: 98.2 F | OXYGEN SATURATION: 98 % | BODY MASS INDEX: 21.01 KG/M2 | HEART RATE: 85 BPM | HEIGHT: 60 IN | WEIGHT: 107 LBS | DIASTOLIC BLOOD PRESSURE: 82 MMHG | RESPIRATION RATE: 16 BRPM

## 2025-07-11 PROBLEM — R05.9 COUGH: Status: ACTIVE | Noted: 2025-07-10

## 2025-07-11 PROBLEM — J06.9 UPPER RESPIRATORY TRACT INFECTION, UNSPECIFIED TYPE: Status: ACTIVE | Noted: 2025-07-11 | Resolved: 2025-08-10

## 2025-07-11 PROCEDURE — 99214 OFFICE O/P EST MOD 30 MIN: CPT | Mod: 25

## 2025-07-11 PROCEDURE — 94060 EVALUATION OF WHEEZING: CPT

## 2025-07-11 RX ORDER — BECLOMETHASONE DIPROPIONATE HFA 80 UG/1
80 AEROSOL, METERED RESPIRATORY (INHALATION) TWICE DAILY
Qty: 1 | Refills: 11 | Status: ACTIVE | COMMUNITY
Start: 2025-07-11 | End: 1900-01-01

## 2025-07-11 RX ORDER — PREDNISONE 20 MG/1
20 TABLET ORAL
Qty: 21 | Refills: 0 | Status: ACTIVE | COMMUNITY
Start: 2025-07-11 | End: 1900-01-01

## 2025-07-14 LAB
ANION GAP SERPL CALC-SCNC: 16 MMOL/L
BUN SERPL-MCNC: 9 MG/DL
CALCIUM SERPL-MCNC: 9.4 MG/DL
CHLORIDE SERPL-SCNC: 91 MMOL/L
CO2 SERPL-SCNC: 24 MMOL/L
CREAT SERPL-MCNC: 0.67 MG/DL
EGFRCR SERPLBLD CKD-EPI 2021: 93 ML/MIN/1.73M2
GLUCOSE SERPL-MCNC: 104 MG/DL
POTASSIUM SERPL-SCNC: 3.3 MMOL/L
SODIUM SERPL-SCNC: 131 MMOL/L

## 2025-07-14 NOTE — REVIEW OF SYSTEMS
Clinically stable currently being followed by oncologist   [Palpitations] : palpitations [Negative] : Heme/Lymph

## 2025-07-20 PROBLEM — J04.10 TRACHEITIS: Status: ACTIVE | Noted: 2025-07-11

## 2025-07-22 LAB
ANION GAP SERPL CALC-SCNC: 15 MMOL/L
BUN SERPL-MCNC: 12 MG/DL
CALCIUM SERPL-MCNC: 9.6 MG/DL
CHLORIDE SERPL-SCNC: 89 MMOL/L
CO2 SERPL-SCNC: 26 MMOL/L
CREAT SERPL-MCNC: 0.65 MG/DL
EGFRCR SERPLBLD CKD-EPI 2021: 94 ML/MIN/1.73M2
GLUCOSE SERPL-MCNC: 83 MG/DL
POTASSIUM SERPL-SCNC: 3.4 MMOL/L
SODIUM SERPL-SCNC: 129 MMOL/L

## 2025-07-22 RX ORDER — POTASSIUM CHLORIDE 1500 MG/1
20 TABLET, FILM COATED, EXTENDED RELEASE ORAL
Qty: 3 | Refills: 0 | Status: ACTIVE | COMMUNITY
Start: 2025-07-14 | End: 1900-01-01

## 2025-07-22 RX ORDER — LOSARTAN POTASSIUM 100 MG/1
100 TABLET, FILM COATED ORAL
Qty: 90 | Refills: 0 | Status: ACTIVE | COMMUNITY
Start: 2025-07-22 | End: 1900-01-01

## 2025-07-25 ENCOUNTER — EMERGENCY (EMERGENCY)
Facility: HOSPITAL | Age: 70
LOS: 0 days | Discharge: HOME | End: 2025-07-25
Attending: EMERGENCY MEDICINE
Payer: MEDICARE

## 2025-07-25 VITALS
SYSTOLIC BLOOD PRESSURE: 143 MMHG | OXYGEN SATURATION: 98 % | DIASTOLIC BLOOD PRESSURE: 75 MMHG | TEMPERATURE: 98 F | RESPIRATION RATE: 17 BRPM | HEART RATE: 69 BPM

## 2025-07-25 VITALS
HEIGHT: 60 IN | RESPIRATION RATE: 18 BRPM | SYSTOLIC BLOOD PRESSURE: 145 MMHG | TEMPERATURE: 98 F | DIASTOLIC BLOOD PRESSURE: 73 MMHG | HEART RATE: 72 BPM | OXYGEN SATURATION: 99 % | WEIGHT: 112.88 LBS

## 2025-07-25 DIAGNOSIS — R00.2 PALPITATIONS: ICD-10-CM

## 2025-07-25 DIAGNOSIS — I10 ESSENTIAL (PRIMARY) HYPERTENSION: ICD-10-CM

## 2025-07-25 DIAGNOSIS — Z88.8 ALLERGY STATUS TO OTHER DRUGS, MEDICAMENTS AND BIOLOGICAL SUBSTANCES: ICD-10-CM

## 2025-07-25 DIAGNOSIS — E87.1 HYPO-OSMOLALITY AND HYPONATREMIA: ICD-10-CM

## 2025-07-25 DIAGNOSIS — Z88.5 ALLERGY STATUS TO NARCOTIC AGENT: ICD-10-CM

## 2025-07-25 DIAGNOSIS — E03.9 HYPOTHYROIDISM, UNSPECIFIED: ICD-10-CM

## 2025-07-25 LAB
ALBUMIN SERPL ELPH-MCNC: 3.7 G/DL — SIGNIFICANT CHANGE UP (ref 3.3–5)
ALP SERPL-CCNC: 77 U/L — SIGNIFICANT CHANGE UP (ref 40–120)
ALT FLD-CCNC: 26 U/L — SIGNIFICANT CHANGE UP (ref 12–78)
ANION GAP SERPL CALC-SCNC: 7 MMOL/L — SIGNIFICANT CHANGE UP (ref 5–17)
AST SERPL-CCNC: 11 U/L — LOW (ref 15–37)
BASOPHILS # BLD AUTO: 0.02 K/UL — SIGNIFICANT CHANGE UP (ref 0–0.2)
BASOPHILS NFR BLD AUTO: 0.2 % — SIGNIFICANT CHANGE UP (ref 0–2)
BILIRUB SERPL-MCNC: 0.2 MG/DL — SIGNIFICANT CHANGE UP (ref 0.2–1.2)
BUN SERPL-MCNC: 14 MG/DL — SIGNIFICANT CHANGE UP (ref 7–23)
CALCIUM SERPL-MCNC: 9.2 MG/DL — SIGNIFICANT CHANGE UP (ref 8.5–10.1)
CHLORIDE SERPL-SCNC: 96 MMOL/L — SIGNIFICANT CHANGE UP (ref 96–108)
CO2 SERPL-SCNC: 26 MMOL/L — SIGNIFICANT CHANGE UP (ref 22–31)
CREAT SERPL-MCNC: 0.88 MG/DL — SIGNIFICANT CHANGE UP (ref 0.5–1.3)
EGFR: 70 ML/MIN/1.73M2 — SIGNIFICANT CHANGE UP
EGFR: 70 ML/MIN/1.73M2 — SIGNIFICANT CHANGE UP
EOSINOPHIL # BLD AUTO: 0.01 K/UL — SIGNIFICANT CHANGE UP (ref 0–0.5)
EOSINOPHIL NFR BLD AUTO: 0.1 % — SIGNIFICANT CHANGE UP (ref 0–6)
GLUCOSE SERPL-MCNC: 122 MG/DL — HIGH (ref 70–99)
HCT VFR BLD CALC: 36.1 % — SIGNIFICANT CHANGE UP (ref 34.5–45)
HGB BLD-MCNC: 11.8 G/DL — SIGNIFICANT CHANGE UP (ref 11.5–15.5)
IMM GRANULOCYTES # BLD AUTO: 0.11 K/UL — HIGH (ref 0–0.07)
IMM GRANULOCYTES NFR BLD AUTO: 0.9 % — SIGNIFICANT CHANGE UP (ref 0–0.9)
LYMPHOCYTES # BLD AUTO: 0.73 K/UL — LOW (ref 1–3.3)
LYMPHOCYTES NFR BLD AUTO: 5.8 % — LOW (ref 13–44)
MAGNESIUM SERPL-MCNC: 2 MG/DL — SIGNIFICANT CHANGE UP (ref 1.6–2.6)
MCHC RBC-ENTMCNC: 26.5 PG — LOW (ref 27–34)
MCHC RBC-ENTMCNC: 32.7 G/DL — SIGNIFICANT CHANGE UP (ref 32–36)
MCV RBC AUTO: 80.9 FL — SIGNIFICANT CHANGE UP (ref 80–100)
MONOCYTES # BLD AUTO: 0.29 K/UL — SIGNIFICANT CHANGE UP (ref 0–0.9)
MONOCYTES NFR BLD AUTO: 2.3 % — SIGNIFICANT CHANGE UP (ref 2–14)
NEUTROPHILS # BLD AUTO: 11.48 K/UL — HIGH (ref 1.8–7.4)
NEUTROPHILS NFR BLD AUTO: 90.7 % — HIGH (ref 43–77)
NRBC # BLD AUTO: 0 K/UL — SIGNIFICANT CHANGE UP (ref 0–0)
NRBC # FLD: 0 K/UL — SIGNIFICANT CHANGE UP (ref 0–0)
NRBC BLD AUTO-RTO: 0 /100 WBCS — SIGNIFICANT CHANGE UP (ref 0–0)
PHOSPHATE SERPL-MCNC: 3.3 MG/DL — SIGNIFICANT CHANGE UP (ref 2.5–4.5)
PLATELET # BLD AUTO: 394 K/UL — SIGNIFICANT CHANGE UP (ref 150–400)
PMV BLD: 8.9 FL — SIGNIFICANT CHANGE UP (ref 7–13)
POTASSIUM SERPL-MCNC: 4 MMOL/L — SIGNIFICANT CHANGE UP (ref 3.5–5.3)
POTASSIUM SERPL-SCNC: 4 MMOL/L — SIGNIFICANT CHANGE UP (ref 3.5–5.3)
PROT SERPL-MCNC: 6.7 GM/DL — SIGNIFICANT CHANGE UP (ref 6–8.3)
RBC # BLD: 4.46 M/UL — SIGNIFICANT CHANGE UP (ref 3.8–5.2)
RBC # FLD: 15.7 % — HIGH (ref 10.3–14.5)
SODIUM SERPL-SCNC: 129 MMOL/L — LOW (ref 135–145)
WBC # BLD: 12.64 K/UL — HIGH (ref 3.8–10.5)
WBC # FLD AUTO: 12.64 K/UL — HIGH (ref 3.8–10.5)

## 2025-07-25 PROCEDURE — 93005 ELECTROCARDIOGRAM TRACING: CPT

## 2025-07-25 PROCEDURE — 36000 PLACE NEEDLE IN VEIN: CPT

## 2025-07-25 PROCEDURE — 83735 ASSAY OF MAGNESIUM: CPT

## 2025-07-25 PROCEDURE — 93010 ELECTROCARDIOGRAM REPORT: CPT

## 2025-07-25 PROCEDURE — 99283 EMERGENCY DEPT VISIT LOW MDM: CPT | Mod: 25

## 2025-07-25 PROCEDURE — 80053 COMPREHEN METABOLIC PANEL: CPT

## 2025-07-25 PROCEDURE — 84100 ASSAY OF PHOSPHORUS: CPT

## 2025-07-25 PROCEDURE — 99284 EMERGENCY DEPT VISIT MOD MDM: CPT | Mod: FS

## 2025-07-25 PROCEDURE — 36415 COLL VENOUS BLD VENIPUNCTURE: CPT

## 2025-07-25 PROCEDURE — 85025 COMPLETE CBC W/AUTO DIFF WBC: CPT

## 2025-07-25 RX ADMIN — Medication 1000 MILLILITER(S): at 16:53

## 2025-07-25 NOTE — ED ADULT NURSE NOTE - NSFALLUNIVINTERV_ED_ALL_ED
Bed/Stretcher in lowest position, wheels locked, appropriate side rails in place/Call bell, personal items and telephone in reach/Instruct patient to call for assistance before getting out of bed/chair/stretcher/Non-slip footwear applied when patient is off stretcher/[place] to call system/Physically safe environment - no spills, clutter or unnecessary equipment/Purposeful proactive rounding/Room/bathroom lighting operational, light cord in reach

## 2025-07-25 NOTE — ED STATDOCS - CLINICAL SUMMARY MEDICAL DECISION MAKING FREE TEXT BOX
69 y/o present to ED for abnormal lab results (potassium and sodium). Pt was seen on July 7th for viral syndrome. In the ED pt was hydrated  and given potassium supplements. Plan to repeat labs and EKG.

## 2025-07-25 NOTE — ED STATDOCS - PATIENT PORTAL LINK FT
You can access the FollowMyHealth Patient Portal offered by [facility] by registering at the following website: [email or web address]. By joining [facility]’s FollowMyHealth portal, you will also be able to view your health information using other applications (apps) compatible with our system.

## 2025-07-25 NOTE — ED ADULT TRIAGE NOTE - CHIEF COMPLAINT QUOTE
Pt sent to ED by Brett for abnormal labs (hyponatremia and hypokalemia). Endorses palpitations. Denies chest pain. STAT EKG to be completed.

## 2025-07-25 NOTE — ED STATDOCS - CARE PROVIDER_API CALL
Scot West ()  Internal Medicine  [address], [address]  [place] [address]  Phone: [phone]  Fax: [phone]  Follow Up Time: 4-6 Days

## 2025-07-25 NOTE — ED STATDOCS - OBJECTIVE STATEMENT
71 y/o female with a PMHx of HTN, hypothyroidism and anxiety presents to the ED SIB Dr. West c/o hyponatremia and hypokalemia as of 07/21 blood work (sodium 129 and potassium 3.4.). Pt was given two rounds of K+ pills and has been staying hydrated with water and Gatorade. Pt states every time she gets sick with a virus she has hyponatremia. Pt reports Dr. West started ot on steroids 3 weeks ago and finished today. Pt reports last night she started having heart palpitations. Pt reports that she was recommended to take Losartan without water pill. Pt denies vomiting, diarrhea and change in diet. Pt denies seeing nephrologist.

## 2025-07-25 NOTE — ED STATDOCS - PROGRESS NOTE DETAILS
SLICK Box: 69 y/o F with pmhx of HTN, hypothyroidism, hx of hyponatremia who presents to the ED fo eval of palpitations that started last night after pt took a dose of HCTZ/losartan medication. pt was seen at  ED on7/7 for cough and was found to have hyponatremia, which was repleted with ivf. pt saw her pcp on 7/21 for rpt blood work. pt noted to have Sodium of 129, k 3.4. pt called PCP office today as she was having palpitations and told to come to the ED for further evaluation.  Pt was told by PCP to stop taking her HCTZ and switch to just Losartan, but pt took her HCTZ/losartan medication as she did not think that she needed to change her medication. Denies any palpitations at this time. states that she has a "third heart beat" which she is seeing Dr. mao for and there was discussion about getting an ablation to treat. pt has appt with PA at Dr. Mao office coming up.   pt does not want to get admitted to the hospital regardless of how labs result.   Will attempt to reach out to PCP for additional recommendations and f/u if pt leaves from hospital. SLICK Box; spoke with MYLENE Pineda at [facility] office. [facility] is not in the office today. spoke with hayley about pt course. Hayley recommends that pt f/u in the office this week for further evaluation. pending lab results at this time. SLICK Box: eliot normal. na 129. will give fluids. pt reassessed. spoke with pt about importance of stopping hctz as it can be potential cause for hyponatremia. pt understands and states she will take the losartan tomorrow. also stressed importance of following up with cardiologist and pcp for rpt blood work especially if pt does not want to stay in the hospital. na at this time similar to baseline when compared to prior labs. stable for d/c at this time. strict return precautions given.

## 2025-07-25 NOTE — ED STATDOCS - NSFOLLOWUPINSTRUCTIONS_ED_ALL_ED_FT
You were seen in the ED for palpitations.     Your blood work was showing a low sodium level.   Please follow-up with your primary care doctor this week for further evaluation.   please also follow-up with your cardiologist for further evaluation of your palpitations.     DO NOT TAKE THE HYDROCHLOROTHIAZIDE MEDICATION. TAKE JUST THE [place] UNTIL YOU GET FURTHER EVALUATION.    Return to the ED for any new or worsening symptoms.     Heart Palpitations    WHAT YOU NEED TO KNOW:    Heart palpitations are feelings that your heart races, jumps, throbs, or flutters. You may feel extra beats, no beats for a short time, or skipped beats. You may have these feelings in your chest, throat, or neck. They may happen when you are sitting, standing, or lying. Heart palpitations may be frightening, but are usually not caused by a serious problem.    DISCHARGE INSTRUCTIONS:    Call 911 or have someone else call for any of the following:    You have any of the following signs of a heart attack:  Squeezing, pressure, or pain in your chest    You may also have any of the following:  Discomfort or pain in your back, neck, jaw, stomach, or arm    Shortness of breath    Nausea or vomiting    Lightheadedness or a sudden cold sweat    You have any of the following signs of a stroke:  Numbness or drooping on one side of your face    Weakness in an arm or leg    Confusion or difficulty speaking    Dizziness, a severe headache, or vision loss    You faint or lose consciousness.  Return to the emergency department if:    Your palpitations happen more often or get more intense.    Contact your healthcare provider if:    You have new or worsening swelling in your feet or ankles.    You have questions or concerns about your condition or care.  Follow up with your healthcare provider as directed: You may need to follow up with a cardiologist. You may need tests to check for heart problems that cause palpitations. Write down your questions so you remember to ask them during your visits.    Keep a record: Write down when your palpitations start and stop, what you were doing when they started, and your symptoms. Keep track of what you ate or drank within a few hours of your palpitations. Include anything that seemed to help your symptoms, such as lying down or holding your breath. This record will help you and your healthcare provider learn what triggers your palpitations. Bring this record with you to your follow up visits.    Help prevent heart palpitations:    Manage stress and anxiety. Find ways to relax such as listening to music, meditating, or doing yoga. Exercise can also help decrease stress and anxiety. Talk to someone you trust about your stress or anxiety. You can also talk to a therapist.    Get plenty of sleep every night. Ask your healthcare provider how much sleep you need each night.    Do not drink caffeine or alcohol. Caffeine and alcohol can make your palpitations worse. Caffeine is found in soda, coffee, tea, chocolate, and drinks that increase your energy.    Do not smoke. Nicotine and other chemicals in cigarettes and cigars may damage your heart and blood vessels. Ask your healthcare provider for information if you currently smoke and need help to quit. E-cigarettes or smokeless tobacco still contain nicotine. Talk to your healthcare provider before you use these products.    Do not use illegal drugs. Talk to your healthcare provider if you use illegal drugs and want help to quit.

## 2025-08-01 LAB
ANION GAP SERPL CALC-SCNC: 11 MMOL/L
BUN SERPL-MCNC: 10 MG/DL
CALCIUM SERPL-MCNC: 8.9 MG/DL
CHLORIDE SERPL-SCNC: 99 MMOL/L
CO2 SERPL-SCNC: 25 MMOL/L
CREAT SERPL-MCNC: 0.69 MG/DL
EGFRCR SERPLBLD CKD-EPI 2021: 93 ML/MIN/1.73M2
GLUCOSE SERPL-MCNC: 89 MG/DL
POTASSIUM SERPL-SCNC: 3.9 MMOL/L
SODIUM SERPL-SCNC: 135 MMOL/L

## 2025-08-12 ENCOUNTER — APPOINTMENT (OUTPATIENT)
Dept: INTERNAL MEDICINE | Facility: CLINIC | Age: 70
End: 2025-08-12
Payer: MEDICARE

## 2025-08-12 VITALS
BODY MASS INDEX: 21.82 KG/M2 | SYSTOLIC BLOOD PRESSURE: 124 MMHG | HEIGHT: 60 IN | DIASTOLIC BLOOD PRESSURE: 68 MMHG | TEMPERATURE: 98.4 F | HEART RATE: 87 BPM | WEIGHT: 111.13 LBS | OXYGEN SATURATION: 97 %

## 2025-08-12 DIAGNOSIS — I10 ESSENTIAL (PRIMARY) HYPERTENSION: ICD-10-CM

## 2025-08-12 DIAGNOSIS — E87.6 HYPOKALEMIA: ICD-10-CM

## 2025-08-12 DIAGNOSIS — E87.1 HYPO-OSMOLALITY AND HYPONATREMIA: ICD-10-CM

## 2025-08-12 DIAGNOSIS — R00.2 PALPITATIONS: ICD-10-CM

## 2025-08-12 PROCEDURE — 99214 OFFICE O/P EST MOD 30 MIN: CPT

## 2025-08-12 RX ORDER — DILTIAZEM HYDROCHLORIDE 300 MG/1
300 CAPSULE, COATED, EXTENDED RELEASE ORAL
Refills: 0 | Status: ACTIVE | COMMUNITY

## 2025-09-13 DIAGNOSIS — E78.5 HYPERLIPIDEMIA, UNSPECIFIED: ICD-10-CM

## 2025-09-13 RX ORDER — ROSUVASTATIN CALCIUM 5 MG/1
5 TABLET, FILM COATED ORAL
Qty: 30 | Refills: 11 | Status: ACTIVE | COMMUNITY
Start: 2025-09-13

## 2025-09-19 ENCOUNTER — APPOINTMENT (OUTPATIENT)
Dept: ELECTROPHYSIOLOGY | Facility: CLINIC | Age: 70
End: 2025-09-19
Payer: MEDICARE

## 2025-09-19 VITALS
DIASTOLIC BLOOD PRESSURE: 80 MMHG | SYSTOLIC BLOOD PRESSURE: 130 MMHG | HEIGHT: 60 IN | OXYGEN SATURATION: 99 % | HEART RATE: 93 BPM | WEIGHT: 114 LBS | BODY MASS INDEX: 22.38 KG/M2

## 2025-09-19 DIAGNOSIS — I47.19 OTHER SUPRAVENTRICULAR TACHYCARDIA: ICD-10-CM

## 2025-09-19 DIAGNOSIS — I49.3 VENTRICULAR PREMATURE DEPOLARIZATION: ICD-10-CM

## 2025-09-19 PROCEDURE — 99204 OFFICE O/P NEW MOD 45 MIN: CPT

## 2025-09-19 PROCEDURE — 93000 ELECTROCARDIOGRAM COMPLETE: CPT

## 2025-09-19 RX ORDER — VERAPAMIL HYDROCHLORIDE 240 MG/1
240 CAPSULE, DELAYED RELEASE ORAL DAILY
Qty: 30 | Refills: 11 | Status: ACTIVE | COMMUNITY
Start: 2025-09-19 | End: 1900-01-01